# Patient Record
Sex: MALE | Race: ASIAN | Employment: OTHER | URBAN - METROPOLITAN AREA
[De-identification: names, ages, dates, MRNs, and addresses within clinical notes are randomized per-mention and may not be internally consistent; named-entity substitution may affect disease eponyms.]

---

## 2017-01-09 ENCOUNTER — OFFICE VISIT (OUTPATIENT)
Dept: FAMILY MEDICINE | Facility: CLINIC | Age: 72
End: 2017-01-09
Payer: COMMERCIAL

## 2017-01-09 VITALS
OXYGEN SATURATION: 98 % | DIASTOLIC BLOOD PRESSURE: 76 MMHG | HEIGHT: 66 IN | SYSTOLIC BLOOD PRESSURE: 120 MMHG | HEART RATE: 97 BPM | BODY MASS INDEX: 21.53 KG/M2 | WEIGHT: 134 LBS | TEMPERATURE: 97.8 F

## 2017-01-09 DIAGNOSIS — E11.9 TYPE 2 DIABETES MELLITUS WITHOUT COMPLICATION, WITHOUT LONG-TERM CURRENT USE OF INSULIN (H): ICD-10-CM

## 2017-01-09 DIAGNOSIS — L82.1 SK (SEBORRHEIC KERATOSIS): ICD-10-CM

## 2017-01-09 PROCEDURE — 17110 DESTRUCTION B9 LES UP TO 14: CPT | Performed by: FAMILY MEDICINE

## 2017-01-09 PROCEDURE — 99207 C FOOT EXAM  NO CHARGE: CPT | Performed by: FAMILY MEDICINE

## 2017-01-09 PROCEDURE — 99213 OFFICE O/P EST LOW 20 MIN: CPT | Mod: 25 | Performed by: FAMILY MEDICINE

## 2017-01-09 ASSESSMENT — PAIN SCALES - GENERAL: PAINLEVEL: NO PAIN (0)

## 2017-01-09 NOTE — PROGRESS NOTES
SUBJECTIVE:                                                    Stephon Bryant is a 71 year old male who presents to clinic today for the following health issues:    Hypertension Follow-up      Outpatient blood pressures are not being checked.    Low Salt Diet: no added salt       Amount of exercise or physical activity: 5 days/week for an average of greater than 60 minutes    Problems taking medications regularly: No    Medication side effects: none    Diet: regular (no restrictions)      Concern - spot on left cheek     When did it start?: a long time ago    Any strain/injury, other illness, or event to trigger this problem?   no    Previous history of similar problem:   YES - on the other side of face, was removed in clinic with liquid nitrogen      Location:           Left cheek    Describe it:   1 itchy, round spot    Severity: moderate      Progression of symptoms from onset until now:  worsening      Accompanying Signs & Symptoms:  Getting bigger   What have you noticed that tends to make this worse?     None      What have you noticed that tends to make this better?     None      What have you done (this time) to try to treat this problem yourself?     None      Did it help?     no           Past medical, family, and social histories, medications, and allergies are reviewed and updated in Epic.     ROS:  C: NEGATIVE for fever, chills, change in weight  E/M: NEGATIVE for ear, mouth and throat problems  R: NEGATIVE for significant cough or SOB  CV: NEGATIVE for chest pain, palpitations or peripheral edema  ROS otherwise negative    Any history above obtained by the Medical Assistant was reviewed by Dr. Fidencio Cote MD, and edited when necessary.    This document serves as a record of the services and decisions personally performed and made by Dr. Cote. It was created on his behalf by Kiara Cardoso, a trained medical scribe. The creation of this document is based on the provider's statements to the medical  "duran.  Kiara Cardoso January 9, 2017 2:48 PM   OBJECTIVE:                                                    /76 mmHg  Pulse 97  Temp(Src) 97.8  F (36.6  C) (Oral)  Ht 5' 6\" (1.676 m)  Wt 134 lb (60.782 kg)  BMI 21.64 kg/m2  SpO2 98%  Body mass index is 21.64 kg/(m^2).  GENERAL: healthy, alert and no distress  EYES: Eyes grossly normal to inspection, PERRL and conjunctivae and sclerae normal  MS: no gross musculoskeletal defects noted, no edema  SKIN: He has a scaly, benign appearing SK on the right cheek, approximately 7x5 mm  NEURO: Normal strength and tone, mentation intact and speech normal  PSYCH: mentation appears normal, affect normal/bright  Diabetic foot exam: normal DP and PT pulses, no trophic changes or ulcerative lesions, normal sensory exam and normal monofilament exam     ASSESSMENT/PLAN:                                                    (L82.1) SK (seborrheic keratosis)  Comment: 1. I treated an SK in the right sideburn similarly several years ago with good results.   Plan: DESTRUCT BENIGN LESION, UP TO 14        The SK is treated with cryotherapy using liquid nitrogen x 10 seconds x 3 applications. The patient tolerated the procedure well. Recheck at his next regular follow up visit.        (E11.9) Type 2 diabetes mellitus without complication, without long-term current use of insulin (H)  Comment: Patient describes improved glucose readings lately with treatment  Plan: FOOT EXAM  NO CHARGE [81034.114]        Follow up in 4 months         The information in this document, created by the medical scribe for me, accurately reflects the services I personally performed and the decisions made by me. I have reviewed and approved this document for accuracy prior to leaving the patient care area.  Fidencio Cote MD      "

## 2017-01-09 NOTE — MR AVS SNAPSHOT
After Visit Summary   1/9/2017    Stephon Bryant    MRN: [id number]           Patient Information     Date Of Birth          1945        Visit Information        Provider Department      1/9/2017 2:20 PM Fidencio Cote MD [facility]        Today's Diagnoses     SK (seborrheic keratosis)         Type 2 diabetes mellitus without complication, without long-term current use of insulin (H)           Care Instructions        ================================================================================  Normal Values   Blood pressure  <140/90 for most adults    <130/80 for some chronic diseases (ask your care team about yours)    BMI (body mass index)  18.5-25 kg/m2 (based on height and weight)     Thank you for visiting [facility]    Normal or non-critical lab and imaging results will be communicated to you by MyChart, letter or phone within 7 days.  If you do not hear from us within 10 days, please call the clinic. If you have a critical or abnormal lab result, we will notify you by phone as soon as possible.     If you have any questions regarding your visit please contact:     Team Comfort:   Clinic Hours Telephone Number   Dr. Fidencio Acevedo Dr. [place] 7am-5pm  Monday - Friday [phone]  Christiano Beaver RN   Pharmacy 8:00am-8pm Monday-Friday    9am-5pm Saturday-Sunday [phone]   Urgent Care 11am-9pm Monday-Friday        9am-5pm Saturday-Sunday [phone]     After hours, weekend or if you need to make an appointment with your primary provider please call [phone].   After Hours nurse advise: call [facility]: [phone]    Medication Refills:  Call your pharmacy and they will forward the refill to us. Please allow 3 business days for your refills to be completed.                Follow-ups after your visit        Follow-up notes from your care team     Return in  "about 4 months (around 5/8/2017).      Who to contact     If you have questions or need follow up information about today's clinic visit or your schedule please contact [facility] directly at [phone].  Normal or non-critical lab and imaging results will be communicated to you by MyChart, letter or phone within 4 business days after the clinic has received the results. If you do not hear from us within 7 days, please contact the clinic through ganttohart or phone. If you have a critical or abnormal lab result, we will notify you by phone as soon as possible.  Submit refill requests through Bangbite or call your pharmacy and they will forward the refill request to us. Please allow 3 business days for your refill to be completed.          Additional Information About Your Visit        ganttohareCareDiary Information     Bangbite gives you secure access to your electronic health record. If you see a primary care provider, you can also send messages to your care team and make appointments. If you have questions, please call your primary care clinic.  If you do not have a primary care provider, please call [phone] and they will assist you.        Care EveryWhere ID     This is your Care EveryWhere ID. This could be used by other organizations to access your [place] medical records  [id number]        Your Vitals Were     Pulse Temperature Height BMI (Body Mass Index) Pulse Oximetry       97 97.8  F (36.6  C) (Oral) 5' 6\" (1.676 m) 21.64 kg/m2 98%        Blood Pressure from Last 3 Encounters:   01/09/17 120/76   12/08/16 138/82   10/06/16 126/83    Weight from Last 3 Encounters:   01/09/17 134 lb (60.782 kg)   12/29/16 133 lb 12.8 oz (60.691 kg)   12/08/16 136 lb (61.689 kg)              We Performed the Following     DESTRUCT BENIGN LESION, UP TO 14     FOOT EXAM  NO CHARGE [17880.114]        Primary Care Provider Office Phone # Fax #    Fidencio Cote [phone]       [facility]" [place] [address]  [facility] [address]        Thank you!     Thank you for choosing [facility]  for your care. Our goal is always to provide you with excellent care. Hearing back from our patients is one way we can continue to improve our services. Please take a few minutes to complete the written survey that you may receive in the mail after your visit with us. Thank you!             Your Updated Medication List - Protect others around you: Learn how to safely use, store and throw away your medicines at www.disposemymeds.org.          This list is accurate as of: 1/9/17  3:18 PM.  Always use your most recent med list.                   Brand Name Dispense Instructions for use    aspirin 81 MG tablet      Take 1 tablet by mouth daily *.       BIOFLEX PO      Take 1 capful by mouth 1 capsule every other day.       blood glucose monitoring lancets     1 Box    Use to test blood sugar 2 times daily or as directed.  Ok to substitute alternative if insurance prefers.       blood glucose monitoring test strip    ONE TOUCH VERIO IQ    100 strip    Use to test blood sugar 2 times daily or as directed.  Ok to substitute alternative if insurance prefers.       cholecalciferol 5000 UNITS Caps capsule    vitamin D3     Take by mouth daily       etodolac 400 MG tablet    LODINE    60 tablet    Take 1 tablet (400 mg) by mouth 2 times daily (with meals) as needed for joint pain.       FISH OIL      1 capsule every other day       lisinopril 2.5 MG tablet    PRINIVIL/Zestril    90 tablet    Take 1 tablet (2.5 mg) by mouth daily for kidneys in diabetes.       MULTIVITAMIN PO      1 tab every other day       omeprazole 20 MG CR capsule    priLOSEC    90 capsule    Take 1 capsule (20 mg) by mouth daily for reflux.       oxybutynin 5 MG tablet    DITROPAN    90 tablet    Take 1 tablet (5 mg) by mouth every evening to decrease urinary frequency.       pravastatin 40 MG tablet    PRAVACHOL    90 tablet    TAKE  1 TABLET (40 MG) BY MOUTH EVERY EVENING FOR CHOLESTEROL.       scopolamine 72 hr patch    TRANSDERM    4 patch    Apply to hairless area behind one ear at least 4 hours before travel.  Remove old patch and change every 3 days.       traZODone 50 MG tablet    DESYREL    90 tablet    Take 1 tablet (50 mg) by mouth nightly as needed for sleep

## 2017-01-09 NOTE — NURSING NOTE
"Chief Complaint   Patient presents with     Hypertension       Initial /82 mmHg  Pulse 97  Temp(Src) 97.8  F (36.6  C) (Oral)  Ht 5' 6\" (1.676 m)  Wt 134 lb (60.782 kg)  BMI 21.64 kg/m2  SpO2 98% Estimated body mass index is 21.64 kg/(m^2) as calculated from the following:    Height as of this encounter: 5' 6\" (1.676 m).    Weight as of this encounter: 134 lb (60.782 kg).  BP completed using cuff size: marko Lozoya MA      "

## 2017-01-30 ENCOUNTER — OFFICE VISIT (OUTPATIENT)
Dept: FAMILY MEDICINE | Facility: CLINIC | Age: 72
End: 2017-01-30
Payer: COMMERCIAL

## 2017-01-30 ENCOUNTER — TELEPHONE (OUTPATIENT)
Dept: FAMILY MEDICINE | Facility: CLINIC | Age: 72
End: 2017-01-30

## 2017-01-30 VITALS
TEMPERATURE: 97.6 F | OXYGEN SATURATION: 97 % | DIASTOLIC BLOOD PRESSURE: 82 MMHG | SYSTOLIC BLOOD PRESSURE: 128 MMHG | HEART RATE: 86 BPM

## 2017-01-30 DIAGNOSIS — H81.11 BPPV (BENIGN PAROXYSMAL POSITIONAL VERTIGO), RIGHT: Primary | ICD-10-CM

## 2017-01-30 DIAGNOSIS — H61.23 BILATERAL IMPACTED CERUMEN: ICD-10-CM

## 2017-01-30 PROCEDURE — 99213 OFFICE O/P EST LOW 20 MIN: CPT | Performed by: PHYSICIAN ASSISTANT

## 2017-01-30 RX ORDER — MECLIZINE HYDROCHLORIDE 25 MG/1
25 TABLET ORAL EVERY 6 HOURS PRN
Qty: 30 TABLET | Refills: 1 | Status: SHIPPED | OUTPATIENT
Start: 2017-01-30 | End: 2017-07-11

## 2017-01-30 NOTE — MR AVS SNAPSHOT
After Visit Summary   1/30/2017    Stephon Bryant    MRN: [id number]           Patient Information     Date Of Birth          1945        Visit Information        Provider Department      1/30/2017 1:00 PM Joyce Peña PA-C [facility]        Today's Diagnoses     BPPV (benign paroxysmal positional vertigo), right    -  1     Bilateral impacted cerumen           Care Instructions    Meclizine 25 mg every 6 hours as needed for vertigo -dizziness  See physical therapy for vertigo as well    For ear wax use Debrox available over the counter -avoid use of every-tips  Benign Positional Vertigo    The inner ear is located behind the middle ear. It is a part of the balance center of the body. It contains small calcium particles within fluid filled canals (semi-circular canals). These particles can move out of position as a result of aging, head trauma or disease of the inner ear. Once that happens, movement of the head into certain positions may cause the particles to stimulate the inner ear and create the feeling of vertigo.  Vertigo is a false feeling of motion (as if you or the room is spinning). A vertigo attack may cause sudden nausea, vomiting and heavy sweating. Severe vertigo causes a loss of balance and may result in falling. During an attack of vertigo, head movement and body position changes will worsen symptoms.  An episode of vertigo may last seconds, minutes or hours. Once you are over the first episode of vertigo, it may never return. Sometimes symptoms recur off and on over several weeks or longer.  Home Care:    If symptoms are severe, rest quietly in bed. Change positions slowly. There is usually one position that will feel best, such as lying on one side or lying on your back with your head slightly raised on pillows.    Do not drive or work with dangerous machinery for one week after symptoms disappear, in case of a sudden return of symptoms.    Take  medicine as prescribed to relieve your symptoms. Unless another medicine was prescribed for nausea, vomiting and vertigo, you may use over-the-counter motion sickness pills, such as meclizine (Bonine, Bonamine, Antivert) or dimenhydrinate (Dramamine).  Follow Up  with your doctor or as directed by our staff. Report any persistent ringing in the ear or hearing loss to your doctor.  [NOTE: If you had a CT or MRI scan, it will be reviewed by a specialist. You will be notified of any new findings that may affect your care.]  Get Prompt Medical Attention  if any of the following occur:    Worsening of vertigo not controlled by the medicine prescribed    Repeated vomiting not controlled by the medicine prescribed    Increased weakness or fainting    Severe headache or unusual drowsiness or confusion    Weakness of an arm or leg or one side of the face    Difficulty with speech or vision    Seizure    6475-0045 [employer]. [address] [place] [address]. All rights reserved. This information is not intended as a substitute for professional medical care. Always follow your healthcare professional's instructions.        Earwax, Home Treatment    Everyone produces earwax from the lining of the ear canal. It serves to lubricate and protect the ear. The wax that forms in the canal naturally moves toward the outside of the ear and falls out. Sometimes the ear canal may contain too much wax. This can cause a blockage and loss of hearing. Directions are given below for home treatment.  Home care  If your doctor has advised you to remove a wax blockage yourself, follow these directions:    Unless a medicine was prescribed, you may use an over-the-counter product made for clearing earwax. These contain carbamide peroxide. Lie down with the blocked ear facing upward. Apply one dropper full of medicine and wait a few minutes. Grasp the outer ear and wiggle it to help the solution enter the canal.    Lean over  a sink or basin with the blocked ear facing downward. Use a bulb syringe filled with warm (not hot or cold) water to rinse the ear several times. Use gentle pressure only.    If you are having trouble draining the water out of your ear canal, put a few drops of rubbing alcohol (isopropyl alcohol) into the ear canal. This will help remove the remaining water.    Repeat this procedure once a day for up to three days, or until your hearing is back to normal. Do not use this treatment for more than three days in a row.  Don ts    Don t use cold water to rinse the ear. This will make you dizzy.    Don t perform this procedure if you have an ear infection.    Don t perform this procedure if you have a ruptured eardrum.    Don t use cotton swabs, matches, hairpins, keys, or other objects to  clean  the ear canal. This can cause infection of the ear canal or rupture the eardrum. Because of their size and shape, cotton swabs can push earwax deeper into the ear canal instead of removing it.  Follow-up care  Follow up with your health care provider if you are not improving after three cleaning attempts, or as advised.  When to seek medical advice  Call your health care provider right away if any of these occur:    Worsening ear pain    Fever of 101 F (38.3 C) or higher, or as directed by your health care provider    Hearing does not return to normal after three days of treatment    Fluid drainage or bleeding from the ear canal    Swelling, redness, or tenderness of the outer ear    Headache, neck pain, or stiff neck    9205-0254 [employer]. [address] [place] [address], [place] [address]. All rights reserved. This information is not intended as a substitute for professional medical care. Always follow your healthcare professional's instructions.              Follow-ups after your visit        Additional Services     PHYSICAL THERAPY REFERRAL       *This therapy referral will be filtered to a centralized scheduling  office at [facility] and the patient will receive a call to schedule an appointment at a [place] location most convenient for them. *     [facility] provides Physical Therapy evaluation and treatment and many specialty services across the [place] system.  If requesting a specialty program, please choose from the list below.    If you have not heard from the scheduling office within 2 business days, please call [phone] for all locations, with the exception of [place], please call [phone].  Treatment: Evaluation & Treatment  Special Instructions/Modalities: evaluate and treat  Special Programs: Balance/Vestibular    Please be aware that coverage of these services is subject to the terms and limitations of your health insurance plan.  Call member services at your health plan with any benefit or coverage questions.      **Note to Provider:  If you are referring outside of [place] for the therapy appointment, please list the name of the location in the  special instructions  above, print the referral and give to the patient to schedule the appointment.                  Who to contact     If you have questions or need follow up information about today's clinic visit or your schedule please contact [facility] directly at [phone].  Normal or non-critical lab and imaging results will be communicated to you by MyChart, letter or phone within 4 business days after the clinic has received the results. If you do not hear from us within 7 days, please contact the clinic through Halldishart or phone. If you have a critical or abnormal lab result, we will notify you by phone as soon as possible.  Submit refill requests through TidePool or call your pharmacy and they will forward the refill request to us. Please allow 3 business days for your refill to be completed.          Additional Information About Your Visit        MyChart Information     TidePool gives  you secure access to your electronic health record. If you see a primary care provider, you can also send messages to your care team and make appointments. If you have questions, please call your primary care clinic.  If you do not have a primary care provider, please call [phone] and they will assist you.        Care EveryWhere ID     This is your Care EveryWhere ID. This could be used by other organizations to access your [place] medical records  [id number]        Your Vitals Were     Pulse Temperature Pulse Oximetry             86 97.6  F (36.4  C) (Oral) 97%          Blood Pressure from Last 3 Encounters:   01/30/17 128/82   01/09/17 120/76   12/08/16 138/82    Weight from Last 3 Encounters:   01/09/17 134 lb (60.782 kg)   12/29/16 133 lb 12.8 oz (60.691 kg)   12/08/16 136 lb (61.689 kg)              We Performed the Following     PHYSICAL THERAPY REFERRAL          Today's Medication Changes          These changes are accurate as of: 1/30/17  1:45 PM.  If you have any questions, ask your nurse or doctor.               Start taking these medicines.        Dose/Directions    meclizine 25 MG tablet   Commonly known as:  ANTIVERT   Used for:  BPPV (benign paroxysmal positional vertigo), right   Started by:  Joyce Peña PA-C        Dose:  25 mg   Take 1 tablet (25 mg) by mouth every 6 hours as needed for dizziness   Quantity:  30 tablet   Refills:  1            Where to get your medicines      These medications were sent to [id number] - [facility] [address] [address]     Phone:  [phone]    - meclizine 25 MG tablet             Primary Care Provider Office Phone # Fax #    Fidencio Cote [phone]       [facility] [address]  [facility] [address]        Thank you!     Thank you for choosing [facility]  for your care. Our goal is always to provide you with excellent  care. Hearing back from our patients is one way we can continue to improve our services. Please take a few minutes to complete the written survey that you may receive in the mail after your visit with us. Thank you!             Your Updated Medication List - Protect others around you: Learn how to safely use, store and throw away your medicines at www.disposemymeds.org.          This list is accurate as of: 1/30/17  1:45 PM.  Always use your most recent med list.                   Brand Name Dispense Instructions for use    aspirin 81 MG tablet      Take 1 tablet by mouth daily *.       BIOFLEX PO      Take 1 capful by mouth 1 capsule every other day.       blood glucose monitoring lancets     1 Box    Use to test blood sugar 2 times daily or as directed.  Ok to substitute alternative if insurance prefers.       blood glucose monitoring test strip    ONE TOUCH VERIO IQ    100 strip    Use to test blood sugar 2 times daily or as directed.  Ok to substitute alternative if insurance prefers.       cholecalciferol 5000 UNITS Caps capsule    vitamin D3     Take by mouth daily       etodolac 400 MG tablet    LODINE    60 tablet    Take 1 tablet (400 mg) by mouth 2 times daily (with meals) as needed for joint pain.       FISH OIL      1 capsule every other day       lisinopril 2.5 MG tablet    PRINIVIL/Zestril    90 tablet    Take 1 tablet (2.5 mg) by mouth daily for kidneys in diabetes.       meclizine 25 MG tablet    ANTIVERT    30 tablet    Take 1 tablet (25 mg) by mouth every 6 hours as needed for dizziness       MULTIVITAMIN PO      1 tab every other day       omeprazole 20 MG CR capsule    priLOSEC    90 capsule    Take 1 capsule (20 mg) by mouth daily for reflux.       oxybutynin 5 MG tablet    DITROPAN    90 tablet    Take 1 tablet (5 mg) by mouth every evening to decrease urinary frequency.       pravastatin 40 MG tablet    PRAVACHOL    90 tablet    TAKE 1 TABLET (40 MG) BY MOUTH EVERY EVENING FOR CHOLESTEROL.        scopolamine 72 hr patch    TRANSDERM    4 patch    Apply to hairless area behind one ear at least 4 hours before travel.  Remove old patch and change every 3 days.       traZODone 50 MG tablet    DESYREL    90 tablet    Take 1 tablet (50 mg) by mouth nightly as needed for sleep

## 2017-01-30 NOTE — PROGRESS NOTES
SUBJECTIVE:                                                    Stephon Bryant is a 71 year old male who presents to clinic today for the following health issues:    Dizziness     Onset: x10 days     Description:   Do you feel faint:  no   Does it feel like the surroundings (bed, room) are moving: YES  Unsteady/off balance: YES  Have you passed out or fallen: no     Intensity: moderate    Progression of Symptoms:  same    Accompanying Signs & Symptoms:  Heart palpitations: no   Nausea, vomiting: no   Weakness in arms or legs: YES- legs   Fatigue: YES  Vision or speech changes: no   Ringing in ears (Tinnitus): no   Hearing Loss: yes     History:   Head trauma/concussion hx: no   Previous similar symptoms: YES- once a few year ago  Recent bleeding history: no     Precipitating factors:   Worse with activity or head movement: YES, with turning in bed to the right    Any new medications (BP?): YES- lisinopril     Alleviating factors:   Does staying in a fixed position give relief:  YES       Therapies Tried and outcome: none      Problem list and histories reviewed & adjusted, as indicated.  Additional history: as documented    Patient Active Problem List   Diagnosis     Hyperlipidemia LDL goal <130     History of basal cell carcinoma     Erectile dysfunction     Advance care planning     Gastroesophageal reflux disease without esophagitis     Persistent disorder of initiating or maintaining sleep     Type 2 diabetes mellitus without complication, without long-term current use of insulin (H)     Past Surgical History   Procedure Laterality Date     Colonoscopy       Colonoscopy  12/18/2013     Procedure: COMBINED COLONOSCOPY, SINGLE BIOPSY/POLYPECTOMY BY BIOPSY;  Cecal polyp-X 1, removed with cold biopsy forceps.;  Surgeon: Duane, William Charles, MD;  Location:  OR     Esophagoscopy, gastroscopy, duodenoscopy (egd), combined  12/18/2013     Procedure: COMBINED ESOPHAGOSCOPY, GASTROSCOPY, DUODENOSCOPY (EGD), BIOPSY  SINGLE OR MULTIPLE;  Duodenum BX were taken with cold biopsy forceps to evaluate for spru.;  Surgeon: Duane, William Charles, MD;  Location: [facility] OR     Prostate biopsy, needle, saturation sampling  pre-2005     [facility] for elevated PSA       Social History   Substance Use Topics     Smoking status: Former Smoker -- 0.30 packs/day for 7 years     Types: Cigarettes     Smokeless tobacco: Never Used      Comment: [id number] (in service)     Alcohol Use: No     Family History   Problem Relation Age of Onset     DIABETES Sister      Prostate Cancer No family hx of      unk     Hypertension No family hx of      CEREBROVASCULAR DISEASE No family hx of      HEART DISEASE Father      CANCER Brother 64     lymphoma?     Breast Cancer Sister          Current Outpatient Prescriptions   Medication Sig Dispense Refill     meclizine (ANTIVERT) 25 MG tablet Take 1 tablet (25 mg) by mouth every 6 hours as needed for dizziness 30 tablet 1     blood glucose monitoring (ONE TOUCH VERIO IQ) test strip Use to test blood sugar 2 times daily or as directed.  Ok to substitute alternative if insurance prefers. 100 strip 3     blood glucose monitoring (ONE TOUCH DELICA) lancets Use to test blood sugar 2 times daily or as directed.  Ok to substitute alternative if insurance prefers. 1 Box prn     lisinopril (PRINIVIL/ZESTRIL) 2.5 MG tablet Take 1 tablet (2.5 mg) by mouth daily for kidneys in diabetes. 90 tablet 3     oxybutynin (DITROPAN) 5 MG tablet Take 1 tablet (5 mg) by mouth every evening to decrease urinary frequency. 90 tablet 1     pravastatin (PRAVACHOL) 40 MG tablet TAKE 1 TABLET (40 MG) BY MOUTH EVERY EVENING FOR CHOLESTEROL. 90 tablet 1     traZODone (DESYREL) 50 MG tablet Take 1 tablet (50 mg) by mouth nightly as needed for sleep 90 tablet 1     scopolamine (TRANSDERM) (1.5mg base/3day) patch Apply to hairless area behind one ear at least 4 hours before travel.  Remove old patch and change every 3 days. 4 patch 0     etodolac  (LODINE) 400 MG tablet Take 1 tablet (400 mg) by mouth 2 times daily (with meals) as needed for joint pain. 60 tablet 0     omeprazole (PRILOSEC) 20 MG capsule Take 1 capsule (20 mg) by mouth daily for reflux. 90 capsule 1     Bioflavonoid Products (BIOFLEX PO) Take 1 capful by mouth 1 capsule every other day.       cholecalciferol (VITAMIN D3) 5000 UNITS CAPS capsule Take by mouth daily       aspirin 81 MG tablet Take 1 tablet by mouth daily *.        FISH OIL 1 capsule every other day       Multiple Vitamins-Minerals (MULTIVITAMIN OR) 1 tab every other day       Problem list, Medication list, Allergies, and Medical/Social/Surgical histories reviewed in [facility] and updated as appropriate.    ROS:  Constitutional, HEENT, cardiovascular, pulmonary, gi and gu systems are negative, except as otherwise noted.    OBJECTIVE:                                                    /82 mmHg  Pulse 86  Temp(Src) 97.6  F (36.4  C) (Oral)  SpO2 97%  There is no weight on file to calculate BMI.  GENERAL: healthy, alert and no distress  HENT: bilateral ear canals have moderate amount of ear wax, TM's normal, nose and mouth without ulcers or lesions  NECK: no adenopathy, no asymmetry, masses, or scars and thyroid normal to palpation  RESP: lungs clear to auscultation - no rales, rhonchi or wheezes  CV: regular rate and rhythm, normal S1 S2, no S3 or S4, no murmur, click or rub, no peripheral edema and peripheral pulses strong  ABDOMEN: soft, nontender, no hepatosplenomegaly, no masses and bowel sounds normal  MS: no gross musculoskeletal defects noted, no edema  NEURO: Normal strength and tone, sensory exam grossly normal, mentation intact, oriented times 3, speech normal, cranial nerves 2-12 intact, DTR's normal and symmetric +2, gait normal including heel/toe/tandem walking and Romberg normal    Diagnostic Test Results:  none      ASSESSMENT/PLAN:                                                        ICD-10-CM    1. BPPV  (benign paroxysmal positional vertigo), right H81.11 PHYSICAL THERAPY REFERRAL     meclizine (ANTIVERT) 25 MG tablet   2. Bilateral impacted cerumen H61.23      1.Meclizine 25 mg every 6 hours as needed for vertigo -dizziness, only for severe cases  See physical therapy for vertigo as well    2. Patient could not tolerate ear lavage, he will use  Debrox available over the counter -avoid use of q-tips      Joyce Peña PA-C  [facility]

## 2017-01-30 NOTE — PATIENT INSTRUCTIONS
Meclizine 25 mg every 6 hours as needed for vertigo -dizziness  See physical therapy for vertigo as well    For ear wax use Debrox available over the counter -avoid use of every-tips  Benign Positional Vertigo    The inner ear is located behind the middle ear. It is a part of the balance center of the body. It contains small calcium particles within fluid filled canals (semi-circular canals). These particles can move out of position as a result of aging, head trauma or disease of the inner ear. Once that happens, movement of the head into certain positions may cause the particles to stimulate the inner ear and create the feeling of vertigo.  Vertigo is a false feeling of motion (as if you or the room is spinning). A vertigo attack may cause sudden nausea, vomiting and heavy sweating. Severe vertigo causes a loss of balance and may result in falling. During an attack of vertigo, head movement and body position changes will worsen symptoms.  An episode of vertigo may last seconds, minutes or hours. Once you are over the first episode of vertigo, it may never return. Sometimes symptoms recur off and on over several weeks or longer.  Home Care:    If symptoms are severe, rest quietly in bed. Change positions slowly. There is usually one position that will feel best, such as lying on one side or lying on your back with your head slightly raised on pillows.    Do not drive or work with dangerous machinery for one week after symptoms disappear, in case of a sudden return of symptoms.    Take medicine as prescribed to relieve your symptoms. Unless another medicine was prescribed for nausea, vomiting and vertigo, you may use over-the-counter motion sickness pills, such as meclizine (Bonine, Bonamine, Antivert) or dimenhydrinate (Dramamine).  Follow Up  with your doctor or as directed by our staff. Report any persistent ringing in the ear or hearing loss to your doctor.  [NOTE: If you had a CT or MRI scan, it will be reviewed  by a specialist. You will be notified of any new findings that may affect your care.]  Get Prompt Medical Attention  if any of the following occur:    Worsening of vertigo not controlled by the medicine prescribed    Repeated vomiting not controlled by the medicine prescribed    Increased weakness or fainting    Severe headache or unusual drowsiness or confusion    Weakness of an arm or leg or one side of the face    Difficulty with speech or vision    Seizure    9681-4252 The [employer]. [address] [place] [address], [place] [address]. All rights reserved. This information is not intended as a substitute for professional medical care. Always follow your healthcare professional's instructions.        Earwax, Home Treatment    Everyone produces earwax from the lining of the ear canal. It serves to lubricate and protect the ear. The wax that forms in the canal naturally moves toward the outside of the ear and falls out. Sometimes the ear canal may contain too much wax. This can cause a blockage and loss of hearing. Directions are given below for home treatment.  Home care  If your doctor has advised you to remove a wax blockage yourself, follow these directions:    Unless a medicine was prescribed, you may use an over-the-counter product made for clearing earwax. These contain carbamide peroxide. Lie down with the blocked ear facing upward. Apply one dropper full of medicine and wait a few minutes. Grasp the outer ear and wiggle it to help the solution enter the canal.    Lean over a sink or basin with the blocked ear facing downward. Use a bulb syringe filled with warm (not hot or cold) water to rinse the ear several times. Use gentle pressure only.    If you are having trouble draining the water out of your ear canal, put a few drops of rubbing alcohol (isopropyl alcohol) into the ear canal. This will help remove the remaining water.    Repeat this procedure once a day for up to three days, or until your  hearing is back to normal. Do not use this treatment for more than three days in a row.  Don ts    Don t use cold water to rinse the ear. This will make you dizzy.    Don t perform this procedure if you have an ear infection.    Don t perform this procedure if you have a ruptured eardrum.    Don t use cotton swabs, matches, hairpins, keys, or other objects to  clean  the ear canal. This can cause infection of the ear canal or rupture the eardrum. Because of their size and shape, cotton swabs can push earwax deeper into the ear canal instead of removing it.  Follow-up care  Follow up with your health care provider if you are not improving after three cleaning attempts, or as advised.  When to seek medical advice  Call your health care provider right away if any of these occur:    Worsening ear pain    Fever of 101 F (38.3 C) or higher, or as directed by your health care provider    Hearing does not return to normal after three days of treatment    Fluid drainage or bleeding from the ear canal    Swelling, redness, or tenderness of the outer ear    Headache, neck pain, or stiff neck    6130-7013 [employer] [address] [place] [address]. All rights reserved. This information is not intended as a substitute for professional medical care. Always follow your healthcare professional's instructions.

## 2017-01-30 NOTE — TELEPHONE ENCOUNTER
: 1945  PHONE #'s: [phone] (home)     PRESENTING PROBLEM:  Dizziness    NURSING ASSESSMENT  Description:  Patient reports dizziness for the past 10 days.  Dizziness worsens when he changes position.  Patient denies any weakness, loss of consciousness.  No change in vision.    Onset/duration:  >10 days  Precip. factors:  Diabetic and on blood pressure medication  Sx specific meds:  lisinopril  Last exam/Tx:  17    RECOMMENDED DISPOSITION:  See in 2- 4 hours, another person to drive -   Will comply with recommendation: YES  If further questions/concerns or if Sx do not improve, worsen or new Sx develop, call your PCP or [facility] as soon as possible.    NOTES:  Disposition was determined by the first positive assessment question, therefore all previous assessment questions were negative.  Informed to check provider manual or call insurance company to assure coverage.    Guideline used:  Telephone Triage Protocols for Nurses, Fifth Edition, Jenni Salazar RN  2017

## 2017-03-14 ENCOUNTER — TRANSFERRED RECORDS (OUTPATIENT)
Dept: HEALTH INFORMATION MANAGEMENT | Facility: CLINIC | Age: 72
End: 2017-03-14

## 2017-03-27 ENCOUNTER — OFFICE VISIT (OUTPATIENT)
Dept: FAMILY MEDICINE | Facility: CLINIC | Age: 72
End: 2017-03-27
Payer: COMMERCIAL

## 2017-03-27 VITALS
TEMPERATURE: 96.2 F | BODY MASS INDEX: 21.86 KG/M2 | SYSTOLIC BLOOD PRESSURE: 98 MMHG | HEIGHT: 66 IN | DIASTOLIC BLOOD PRESSURE: 67 MMHG | HEART RATE: 67 BPM | OXYGEN SATURATION: 100 % | WEIGHT: 136 LBS

## 2017-03-27 DIAGNOSIS — E11.9 TYPE 2 DIABETES MELLITUS WITHOUT COMPLICATION, WITHOUT LONG-TERM CURRENT USE OF INSULIN (H): ICD-10-CM

## 2017-03-27 DIAGNOSIS — H61.21 IMPACTED CERUMEN OF RIGHT EAR: ICD-10-CM

## 2017-03-27 DIAGNOSIS — H10.33 ACUTE CONJUNCTIVITIS OF BOTH EYES, UNSPECIFIED ACUTE CONJUNCTIVITIS TYPE: Primary | ICD-10-CM

## 2017-03-27 PROCEDURE — 99213 OFFICE O/P EST LOW 20 MIN: CPT | Performed by: NURSE PRACTITIONER

## 2017-03-27 RX ORDER — POLYMYXIN B SULFATE AND TRIMETHOPRIM 1; 10000 MG/ML; [USP'U]/ML
2 SOLUTION OPHTHALMIC EVERY 4 HOURS
Qty: 5 ML | Refills: 0 | Status: SHIPPED | OUTPATIENT
Start: 2017-03-27 | End: 2017-04-03

## 2017-03-27 NOTE — PATIENT INSTRUCTIONS
Based on your medical history and these are the current health maintenance or preventive care services that you are due for (some may have been done at this visit)  Health Maintenance Due   Topic Date Due     EYE EXAM Q1 YEAR( NO INBASKET)  02/23/1946         At [facility], we strive to deliver an exceptional experience to you, every time we see you.    If you receive a survey in the mail, please send us back your thoughts. We really do value your feedback.    Your care team's suggested websites for health information:  Www.[employer].org : Up to date and easily searchable information on multiple topics.  Www.medlineplus.gov : medication info, interactive tutorials, watch real surgeries online  Www.familydoctor.org : good info from the Academy of Family Physicians  Www.cdc.gov : public health info, travel advisories, epidemics (H1N1)  Www.aap.org : children's health info, normal development, vaccinations  Www.health.[facility].[place].us : [place] dept of health, public health issues in [place], N1N1    How to contact your care team:   Team [facility] [phone]         Pharmacy [phone]    Dr. Slade, Sia Peña PA-C, Dr. Mejia, Marry NAVA CNP, Kristie Stroud PA-C, Dr. Mckeon, and ELIJAH Singh CNP    Team RNs: Tess & Ayleen      Clinic hours  M-Th 7 am-7 pm   Fri 7 am-5 pm.   Urgent care M-F 11 am-9 pm,   Sat/Sun 9 am-5 pm.  Pharmacy M-Th 8 am-8 pm Fri 8 am-6 pm  Sat/Sun 9 am-5 pm.     All password changes, disabled accounts, or ID changes in [email or web address] will be done by our Access Services Department.    If you need help with your account or password, call: [phone]. Clinic staff no longer has the ability to change passwords.     Conjunctivitis, Nonspecific    The membrane that covers the white part of your eye (the conjunctiva) is inflamed. Inflammation happens when your body responds to an injury, allergic reaction, infection, or illness. Symptoms of  inflammation in the eye may include redness, irritation, itching, swelling, or burning. These symptoms should go away within the next 24 hours. Conjunctivitis may be related to a particle that was in your eye. If so, it may wash out with your tears or irrigation treatment. Being exposed to liquid chemicals or fumes may also cause this reaction.   Home care    Apply a cold pack (ice in a plastic bag, wrapped in a towel) over the eye for 20 minutes at a time. This will reduce pain.    Artificial tears may be prescribed to reduce irritation or redness.  These should be used 3 to 4 times a day.    You may use acetaminophen or ibuprofen to control pain, unless another medicine was prescribed.(Note: If you have chronic liver or kidney disease, or if you have ever had a stomach ulcer or gastrointestinal bleeding, talk with your healthcare provider before using these medicines.)  Follow-up care  Follow up with your healthcare provider, or as advised.  When to seek medical advice  Call your healthcare provider right away if any of these occur:    Increased eyelid swelling    Increased eye pain    Increased redness or drainage from the eye    Increased blurry vision or increased sensitivity to light    Failure of normal vision to return within 24 to 48 hours    9494-3033 The [employer]. [address] [place] [address]. All rights reserved. This information is not intended as a substitute for professional medical care. Always follow your healthcare professional's instructions.          Earwax, Home Treatment    Everyone produces earwax from the lining of the ear canal. It serves to lubricate and protect the ear. The wax that forms in the canal naturally moves toward the outside of the ear and falls out. Sometimes the ear canal may contain too much wax. This can cause a blockage and loss of hearing. Directions are given below for home treatment.  Home care  If your doctor has advised you to remove a wax  blockage yourself, follow these directions:    Unless a medicine was prescribed, you may use an over-the-counter product made for clearing earwax. These contain carbamide peroxide. Lie down with the blocked ear facing upward. Apply one dropper full of medicine and wait a few minutes. Grasp the outer ear and wiggle it to help the solution enter the canal.    Lean over a sink or basin with the blocked ear facing downward. Use a bulb syringe filled with warm (not hot or cold) water to rinse the ear several times. Use gentle pressure only.    If you are having trouble draining the water out of your ear canal, put a few drops of rubbing alcohol (isopropyl alcohol) into the ear canal. This will help remove the remaining water.    Repeat this procedure once a day for up to three days, or until your hearing is back to normal. Do not use this treatment for more than three days in a row.  Don ts    Don t use cold water to rinse the ear. This will make you dizzy.    Don t perform this procedure if you have an ear infection.    Don t perform this procedure if you have a ruptured eardrum.    Don t use cotton swabs, matches, hairpins, keys, or other objects to  clean  the ear canal. This can cause infection of the ear canal or rupture the eardrum. Because of their size and shape, cotton swabs can push earwax deeper into the ear canal instead of removing it.  Follow-up care  Follow up with your health care provider if you are not improving after three cleaning attempts, or as advised.  When to seek medical advice  Call your health care provider right away if any of these occur:    Worsening ear pain    Fever of 101 F (38.3 C) or higher, or as directed by your health care provider    Hearing does not return to normal after three days of treatment    Fluid drainage or bleeding from the ear canal    Swelling, redness, or tenderness of the outer ear    Headache, neck pain, or stiff neck    9098-1535 [employer] [address]  [place] [address]. All rights reserved. This information is not intended as a substitute for professional medical care. Always follow your healthcare professional's instructions.

## 2017-03-27 NOTE — MR AVS SNAPSHOT
After Visit Summary   3/27/2017    Stephon Bryant    MRN: [id number]           Patient Information     Date Of Birth          1945        Visit Information        Provider Department      3/27/2017 10:00 AM Cailin Flores APRN CNP [facility]        Today's Diagnoses     Acute conjunctivitis of both eyes, unspecified acute conjunctivitis type    -  1      Care Instructions    Based on your medical history and these are the current health maintenance or preventive care services that you are due for (some may have been done at this visit)  Health Maintenance Due   Topic Date Due     EYE EXAM Q1 YEAR( NO INBASKET)  02/23/1946         At [facility], we strive to deliver an exceptional experience to you, every time we see you.    If you receive a survey in the mail, please send us back your thoughts. We really do value your feedback.    Your care team's suggested websites for health information:  Www.Artspace.org : Up to date and easily searchable information on multiple topics.  Www.medlineplus.gov : medication info, interactive tutorials, watch real surgeries online  Www.familydoctor.org : good info from the Academy of Family Physicians  Www.cdc.gov : public health info, travel advisories, epidemics (H1N1)  Www.aap.org : children's health info, normal development, vaccinations  Www.health.state.[place].us : [place] dept of health, public health issues in [place], N1N1    How to contact your care team:   Team Earnestine/Spirit [phone]         Pharmacy [phone]    Dr. Slade, Sia Peña PA-C, Marry Lawson CNP, Kristie Stroud PA-C, Dr. Mckeon, and ELIJAH Singh CNP    Team RNs: Tess & Ayleen      Clinic hours  M-Th 7 am-7 pm   Fri 7 am-5 pm.   Urgent care M-F 11 am-9 pm,   Sat/Sun 9 am-5 pm.  Pharmacy M-Th 8 am-8 pm Fri 8 am-6 pm  Sat/Sun 9 am-5 pm.     All password changes, disabled accounts, or ID changes in [employer]Outsmart  will be done by our Access Services Department.    If you need help with your account or password, call: [phone]. Clinic staff no longer has the ability to change passwords.     Conjunctivitis, Nonspecific    The membrane that covers the white part of your eye (the conjunctiva) is inflamed. Inflammation happens when your body responds to an injury, allergic reaction, infection, or illness. Symptoms of inflammation in the eye may include redness, irritation, itching, swelling, or burning. These symptoms should go away within the next 24 hours. Conjunctivitis may be related to a particle that was in your eye. If so, it may wash out with your tears or irrigation treatment. Being exposed to liquid chemicals or fumes may also cause this reaction.   Home care    Apply a cold pack (ice in a plastic bag, wrapped in a towel) over the eye for 20 minutes at a time. This will reduce pain.    Artificial tears may be prescribed to reduce irritation or redness.  These should be used 3 to 4 times a day.    You may use acetaminophen or ibuprofen to control pain, unless another medicine was prescribed.(Note: If you have chronic liver or kidney disease, or if you have ever had a stomach ulcer or gastrointestinal bleeding, talk with your healthcare provider before using these medicines.)  Follow-up care  Follow up with your healthcare provider, or as advised.  When to seek medical advice  Call your healthcare provider right away if any of these occur:    Increased eyelid swelling    Increased eye pain    Increased redness or drainage from the eye    Increased blurry vision or increased sensitivity to light    Failure of normal vision to return within 24 to 48 hours    2722-0224 [employer]. [address] [place] [address], [place] [address]. All rights reserved. This information is not intended as a substitute for professional medical care. Always follow your healthcare professional's instructions.          Earwax, Home  Treatment    Everyone produces earwax from the lining of the ear canal. It serves to lubricate and protect the ear. The wax that forms in the canal naturally moves toward the outside of the ear and falls out. Sometimes the ear canal may contain too much wax. This can cause a blockage and loss of hearing. Directions are given below for home treatment.  Home care  If your doctor has advised you to remove a wax blockage yourself, follow these directions:    Unless a medicine was prescribed, you may use an over-the-counter product made for clearing earwax. These contain carbamide peroxide. Lie down with the blocked ear facing upward. Apply one dropper full of medicine and wait a few minutes. Grasp the outer ear and wiggle it to help the solution enter the canal.    Lean over a sink or basin with the blocked ear facing downward. Use a bulb syringe filled with warm (not hot or cold) water to rinse the ear several times. Use gentle pressure only.    If you are having trouble draining the water out of your ear canal, put a few drops of rubbing alcohol (isopropyl alcohol) into the ear canal. This will help remove the remaining water.    Repeat this procedure once a day for up to three days, or until your hearing is back to normal. Do not use this treatment for more than three days in a row.  Don ts    Don t use cold water to rinse the ear. This will make you dizzy.    Don t perform this procedure if you have an ear infection.    Don t perform this procedure if you have a ruptured eardrum.    Don t use cotton swabs, matches, hairpins, keys, or other objects to  clean  the ear canal. This can cause infection of the ear canal or rupture the eardrum. Because of their size and shape, cotton swabs can push earwax deeper into the ear canal instead of removing it.  Follow-up care  Follow up with your health care provider if you are not improving after three cleaning attempts, or as advised.  When to seek medical advice  Call your  health care provider right away if any of these occur:    Worsening ear pain    Fever of 101 F (38.3 C) or higher, or as directed by your health care provider    Hearing does not return to normal after three days of treatment    Fluid drainage or bleeding from the ear canal    Swelling, redness, or tenderness of the outer ear    Headache, neck pain, or stiff neck    9066-4909 [employer] [address] [place] [address]. All rights reserved. This information is not intended as a substitute for professional medical care. Always follow your healthcare professional's instructions.              Follow-ups after your visit        Who to contact     If you have questions or need follow up information about today's clinic visit or your schedule please contact [facility] directly at [phone].  Normal or non-critical lab and imaging results will be communicated to you by MyChart, letter or phone within 4 business days after the clinic has received the results. If you do not hear from us within 7 days, please contact the clinic through MyChart or phone. If you have a critical or abnormal lab result, we will notify you by phone as soon as possible.  Submit refill requests through [employer] or call your pharmacy and they will forward the refill request to us. Please allow 3 business days for your refill to be completed.          Additional Information About Your Visit        [employer] Information     [employer] gives you secure access to your electronic health record. If you see a primary care provider, you can also send messages to your care team and make appointments. If you have questions, please call your primary care clinic.  If you do not have a primary care provider, please call [phone] and they will assist you.        Care EveryWhere ID     This is your Care EveryWhere ID. This could be used by other organizations to access your [place] medical records  [id number]       "  Your Vitals Were     Pulse Temperature Height Pulse Oximetry BMI (Body Mass Index)       67 96.2  F (35.7  C) (Oral) 5' 6\" (1.676 m) 100% 21.95 kg/m2        Blood Pressure from Last 3 Encounters:   03/27/17 98/67   01/30/17 128/82   01/09/17 120/76    Weight from Last 3 Encounters:   03/27/17 136 lb (61.7 kg)   01/09/17 134 lb (60.8 kg)   12/29/16 133 lb 12.8 oz (60.7 kg)              Today, you had the following     No orders found for display         Today's Medication Changes          These changes are accurate as of: 3/27/17 10:39 AM.  If you have any questions, ask your nurse or doctor.               Start taking these medicines.        Dose/Directions    trimethoprim-polymyxin b ophthalmic solution   Commonly known as:  POLYTRIM   Used for:  Acute conjunctivitis of both eyes, unspecified acute conjunctivitis type   Started by:  Cailin Flores APRN CNP        Dose:  2 drop   Place 2 drops into both eyes every 4 hours for 7 days   Quantity:  5 mL   Refills:  0            Where to get your medicines      These medications were sent to [id number] - [place], [place] - [address]  [address] [facility] [address]     Phone:  [phone]     trimethoprim-polymyxin b ophthalmic solution                Primary Care Provider Office Phone # Fax #    Fidencio Cote [phone]       [facility] [place] [address]  [facility] [address]        Thank you!     Thank you for choosing [facility]  for your care. Our goal is always to provide you with excellent care. Hearing back from our patients is one way we can continue to improve our services. Please take a few minutes to complete the written survey that you may receive in the mail after your visit with us. Thank you!             Your Updated Medication List - Protect others around you: Learn how to safely use, store and throw away your medicines at www.disposemymeds.org.          This list " is accurate as of: 3/27/17 10:39 AM.  Always use your most recent med list.                   Brand Name Dispense Instructions for use    aspirin 81 MG tablet      Take 1 tablet by mouth daily *.       BIOFLEX PO      Take 1 capful by mouth 1 capsule every other day.       blood glucose monitoring lancets     1 Box    Use to test blood sugar 2 times daily or as directed.  Ok to substitute alternative if insurance prefers.       blood glucose monitoring test strip    ONE TOUCH VERIO IQ    100 strip    Use to test blood sugar 2 times daily or as directed.  Ok to substitute alternative if insurance prefers.       cholecalciferol 5000 UNITS Caps capsule    vitamin D3     Take by mouth daily       etodolac 400 MG tablet    LODINE    60 tablet    Take 1 tablet (400 mg) by mouth 2 times daily (with meals) as needed for joint pain.       FISH OIL      1 capsule every other day       lisinopril 2.5 MG tablet    PRINIVIL/Zestril    90 tablet    Take 1 tablet (2.5 mg) by mouth daily for kidneys in diabetes.       meclizine 25 MG tablet    ANTIVERT    30 tablet    Take 1 tablet (25 mg) by mouth every 6 hours as needed for dizziness       MULTIVITAMIN PO      1 tab every other day       omeprazole 20 MG CR capsule    priLOSEC    90 capsule    Take 1 capsule (20 mg) by mouth daily for reflux.       oxybutynin 5 MG tablet    DITROPAN    90 tablet    Take 1 tablet (5 mg) by mouth every evening to decrease urinary frequency.       pravastatin 40 MG tablet    PRAVACHOL    90 tablet    TAKE 1 TABLET (40 MG) BY MOUTH EVERY EVENING FOR CHOLESTEROL.       scopolamine 72 hr patch    TRANSDERM    4 patch    Apply to hairless area behind one ear at least 4 hours before travel.  Remove old patch and change every 3 days.       traZODone 50 MG tablet    DESYREL    90 tablet    Take 1 tablet (50 mg) by mouth nightly as needed for sleep       trimethoprim-polymyxin b ophthalmic solution    POLYTRIM    5 mL    Place 2 drops into both eyes every 4  hours for 7 days

## 2017-03-27 NOTE — NURSING NOTE
"Chief Complaint   Patient presents with     Eye Problem       Initial BP 98/67 (BP Location: Left arm, Patient Position: Chair, Cuff Size: Adult Regular)  Pulse 67  Temp 96.2  F (35.7  C) (Oral)  Ht 5' 6\" (1.676 m)  Wt 136 lb (61.7 kg)  SpO2 100%  BMI 21.95 kg/m2 Estimated body mass index is 21.95 kg/(m^2) as calculated from the following:    Height as of this encounter: 5' 6\" (1.676 m).    Weight as of this encounter: 136 lb (61.7 kg).  Medication Reconciliation: complete   Chichi Douglas MA      "

## 2017-04-03 ENCOUNTER — TELEPHONE (OUTPATIENT)
Dept: FAMILY MEDICINE | Facility: CLINIC | Age: 72
End: 2017-04-03

## 2017-04-03 NOTE — TELEPHONE ENCOUNTER
Visit notes to call back with concerns. Sent message to provider.    Ayleen Bowen RN, [facility]

## 2017-04-03 NOTE — TELEPHONE ENCOUNTER
Reason for Call:  Other prescription    Detailed comments: Pt calling for he was given medication for his pink eye but it has not getting any better and would like to be prescribe an alternative medication.    Phone Number Patient can be reached at: Home number on file [phone] (home)    Best Time: Anytime    Can we leave a detailed message on this number? YES    Call taken on 4/3/2017 at 8:06 AM by Nathaniel Baker

## 2017-04-04 ENCOUNTER — OFFICE VISIT (OUTPATIENT)
Dept: FAMILY MEDICINE | Facility: CLINIC | Age: 72
End: 2017-04-04
Payer: COMMERCIAL

## 2017-04-04 VITALS
DIASTOLIC BLOOD PRESSURE: 80 MMHG | OXYGEN SATURATION: 100 % | HEIGHT: 66 IN | SYSTOLIC BLOOD PRESSURE: 131 MMHG | WEIGHT: 132 LBS | TEMPERATURE: 97 F | HEART RATE: 70 BPM | BODY MASS INDEX: 21.21 KG/M2

## 2017-04-04 DIAGNOSIS — H57.89 EYE IRRITATION: ICD-10-CM

## 2017-04-04 DIAGNOSIS — R10.12 ABDOMINAL PAIN, LEFT UPPER QUADRANT: Primary | ICD-10-CM

## 2017-04-04 LAB
ALBUMIN SERPL-MCNC: 4.2 G/DL (ref 3.4–5)
ALP SERPL-CCNC: 61 U/L (ref 40–150)
ALT SERPL W P-5'-P-CCNC: 33 U/L (ref 0–70)
AMYLASE SERPL-CCNC: 123 U/L (ref 30–110)
AST SERPL W P-5'-P-CCNC: 25 U/L (ref 0–45)
BASOPHILS # BLD AUTO: 0 10E9/L (ref 0–0.2)
BASOPHILS NFR BLD AUTO: 0.3 %
BILIRUB DIRECT SERPL-MCNC: 0.1 MG/DL (ref 0–0.2)
BILIRUB SERPL-MCNC: 0.5 MG/DL (ref 0.2–1.3)
DIFFERENTIAL METHOD BLD: NORMAL
EOSINOPHIL # BLD AUTO: 0.1 10E9/L (ref 0–0.7)
EOSINOPHIL NFR BLD AUTO: 2.2 %
ERYTHROCYTE [DISTWIDTH] IN BLOOD BY AUTOMATED COUNT: 12.5 % (ref 10–15)
HCT VFR BLD AUTO: 45.7 % (ref 40–53)
HGB BLD-MCNC: 14.9 G/DL (ref 13.3–17.7)
LYMPHOCYTES # BLD AUTO: 2.4 10E9/L (ref 0.8–5.3)
LYMPHOCYTES NFR BLD AUTO: 40.1 %
MCH RBC QN AUTO: 32.5 PG (ref 26.5–33)
MCHC RBC AUTO-ENTMCNC: 32.6 G/DL (ref 31.5–36.5)
MCV RBC AUTO: 100 FL (ref 78–100)
MONOCYTES # BLD AUTO: 0.6 10E9/L (ref 0–1.3)
MONOCYTES NFR BLD AUTO: 9.8 %
NEUTROPHILS # BLD AUTO: 2.8 10E9/L (ref 1.6–8.3)
NEUTROPHILS NFR BLD AUTO: 47.6 %
PLATELET # BLD AUTO: 181 10E9/L (ref 150–450)
PROT SERPL-MCNC: 7.5 G/DL (ref 6.8–8.8)
RBC # BLD AUTO: 4.58 10E12/L (ref 4.4–5.9)
WBC # BLD AUTO: 5.9 10E9/L (ref 4–11)

## 2017-04-04 PROCEDURE — 87338 HPYLORI STOOL AG IA: CPT | Performed by: FAMILY MEDICINE

## 2017-04-04 PROCEDURE — 36415 COLL VENOUS BLD VENIPUNCTURE: CPT | Performed by: FAMILY MEDICINE

## 2017-04-04 PROCEDURE — 85025 COMPLETE CBC W/AUTO DIFF WBC: CPT | Performed by: FAMILY MEDICINE

## 2017-04-04 PROCEDURE — 82150 ASSAY OF AMYLASE: CPT | Performed by: FAMILY MEDICINE

## 2017-04-04 PROCEDURE — 99214 OFFICE O/P EST MOD 30 MIN: CPT | Performed by: FAMILY MEDICINE

## 2017-04-04 PROCEDURE — 80076 HEPATIC FUNCTION PANEL: CPT | Performed by: FAMILY MEDICINE

## 2017-04-04 ASSESSMENT — PAIN SCALES - GENERAL: PAINLEVEL: MODERATE PAIN (4)

## 2017-04-04 NOTE — PATIENT INSTRUCTIONS
================================================================================  Normal Values   Blood pressure  <140/90 for most adults    <130/80 for some chronic diseases (ask your care team about yours)    BMI (body mass index)  18.5-25 kg/m2 (based on height and weight)     Thank you for visiting [facility]    Normal or non-critical lab and imaging results will be communicated to you by MyChart, letter or phone within 7 days.  If you do not hear from us within 10 days, please call the clinic. If you have a critical or abnormal lab result, we will notify you by phone as soon as possible.     If you have any questions regarding your visit please contact:     Team Comfort:   Clinic Hours Telephone Number   Dr. Fidencio Castro 7am-5pm  Monday - Friday [phone]  Christiano RN  Fela RN  Saranya RN   Pharmacy 8:00am-8pm Monday-Friday    9am-5pm Saturday- [phone]   Urgent Care 11am-9pm Monday-Friday        9am-5pm Saturday- [phone]     After hours, weekend or if you need to make an appointment with your primary provider please call [phone].   After Hours nurse advise: call [facility]: [phone]    Medication Refills:  Call your pharmacy and they will forward the refill to us. Please allow 3 business days for your refills to be completed.      Please call [facility]/[phone] to schedule your abdominal CT scan.

## 2017-04-04 NOTE — TELEPHONE ENCOUNTER
Spoke with patient this am. He is noting that his eyes are still very red. Patient is noting that he has had left side, under rib abdominal pain for 2 weeks.  After eating it is worse. States he feels it is hard to digest his food. Yesterday got very dizzy while taking a shower. He has only been able to liquids due to the feeling he gets when he eats. Negative for diarrhea, vomiting and fever. He can not rate the pain.     Patient is scheduled in clinic today for assessment.    Ayleen Bowen RN, [facility] Triage

## 2017-04-04 NOTE — MR AVS SNAPSHOT
After Visit Summary   2017    Stephon Bryant    MRN: [id number]           Patient Information     Date Of Birth          1945        Visit Information        Provider Department      2017 3:40 PM Fidencio Cote MD [facility]        Today's Diagnoses     Abdominal pain, left upper quadrant    -  1    Eye irritation          Care Instructions        ================================================================================  Normal Values   Blood pressure  <140/90 for most adults    <130/80 for some chronic diseases (ask your care team about yours)    BMI (body mass index)  18.5-25 kg/m2 (based on height and weight)     Thank you for visiting [facility]    Normal or non-critical lab and imaging results will be communicated to you by MyChart, letter or phone within 7 days.  If you do not hear from us within 10 days, please call the clinic. If you have a critical or abnormal lab result, we will notify you by phone as soon as possible.     If you have any questions regarding your visit please contact:     Team Comfort:   Clinic Hours Telephone Number   Dr. Fidencio Castro 7am-5pm  Monday - Friday [phone]  Christiano Beaver RN   Pharmacy 8:00am-8pm Monday-Friday    9am-5pm Saturday- [phone]   Urgent Care 11am-9pm Monday-Friday        9am-5pm Saturday- [phone]     After hours, weekend or if you need to make an appointment with your primary provider please call [phone].   After Hours nurse advise: call [facility]: [phone]    Medication Refills:  Call your pharmacy and they will forward the refill to us. Please allow 3 business days for your refills to be completed.      Please call [facility] Radiology/[phone] to schedule your abdominal CT scan.           Follow-ups after your visit        Future tests  "that were ordered for you today     Open Future Orders        Priority Expected Expires Ordered    CT Abdomen Pelvis w Contrast Routine  4/4/2018 4/4/2017    H Pylori antigen, stool Routine  5/4/2017 4/4/2017            Who to contact     If you have questions or need follow up information about today's clinic visit or your schedule please contact [facility] directly at [phone].  Normal or non-critical lab and imaging results will be communicated to you by [employer], letter or phone within 4 business days after the clinic has received the results. If you do not hear from us within 7 days, please contact the clinic through [employer] or phone. If you have a critical or abnormal lab result, we will notify you by phone as soon as possible.  Submit refill requests through [employer] or call your pharmacy and they will forward the refill request to us. Please allow 3 business days for your refill to be completed.          Additional Information About Your Visit        [employer] Information     [employer] gives you secure access to your electronic health record. If you see a primary care provider, you can also send messages to your care team and make appointments. If you have questions, please call your primary care clinic.  If you do not have a primary care provider, please call [phone] and they will assist you.        Care EveryWhere ID     This is your Care EveryWhere ID. This could be used by other organizations to access your [place] medical records  [id number]        Your Vitals Were     Pulse Temperature Height Pulse Oximetry BMI (Body Mass Index)       70 97  F (36.1  C) (Oral) 5' 6\" (1.676 m) 100% 21.31 kg/m2        Blood Pressure from Last 3 Encounters:   04/04/17 131/80   03/27/17 98/67   01/30/17 128/82    Weight from Last 3 Encounters:   04/04/17 132 lb (59.9 kg)   03/27/17 136 lb (61.7 kg)   01/09/17 134 lb (60.8 kg)              We Performed the Following     Amylase     CBC with " platelets differential     Hepatic panel        Primary Care Provider Office Phone # Fax #    Fidencio Cote [phone]       [facility] [address]  [facility] [address]        Thank you!     [facility]. Our goal is always to provide you with excellent care. Hearing back from our patients is one way we can continue to improve our services. Please take a few minutes to complete the written survey that you may receive in the mail after your visit with us. Thank you!             Your Updated Medication List - Protect others around you: Learn how to safely use, store and throw away your medicines at www.disposemymeds.org.          This list is accurate as of: 4/4/17  4:13 PM.  Always use your most recent med list.                   Brand Name Dispense Instructions for use    aspirin 81 MG tablet      Take 1 tablet by mouth daily *.       BIOFLEX PO      Take 1 capful by mouth 1 capsule every other day.       blood glucose monitoring lancets     1 Box    Use to test blood sugar 2 times daily or as directed.  Ok to substitute alternative if insurance prefers.       blood glucose monitoring test strip    ONE TOUCH VERIO IQ    100 strip    Use to test blood sugar 2 times daily or as directed.  Ok to substitute alternative if insurance prefers.       cholecalciferol 5000 UNITS Caps capsule    vitamin D3     Take by mouth daily       etodolac 400 MG tablet    LODINE    60 tablet    Take 1 tablet (400 mg) by mouth 2 times daily (with meals) as needed for joint pain.       FISH OIL      1 capsule every other day       lisinopril 2.5 MG tablet    PRINIVIL/Zestril    90 tablet    Take 1 tablet (2.5 mg) by mouth daily for kidneys in diabetes.       meclizine 25 MG tablet    ANTIVERT    30 tablet    Take 1 tablet (25 mg) by mouth every 6 hours as needed for dizziness       MULTIVITAMIN PO      1 tab every other day       omeprazole 20  MG CR capsule    priLOSEC    90 capsule    Take 1 capsule (20 mg) by mouth daily for reflux.       oxybutynin 5 MG tablet    DITROPAN    90 tablet    Take 1 tablet (5 mg) by mouth every evening to decrease urinary frequency.       pravastatin 40 MG tablet    PRAVACHOL    90 tablet    TAKE 1 TABLET (40 MG) BY MOUTH EVERY EVENING FOR CHOLESTEROL.       scopolamine 72 hr patch    TRANSDERM    4 patch    Apply to hairless area behind one ear at least 4 hours before travel.  Remove old patch and change every 3 days.       traZODone 50 MG tablet    DESYREL    90 tablet    Take 1 tablet (50 mg) by mouth nightly as needed for sleep

## 2017-04-04 NOTE — PROGRESS NOTES
SUBJECTIVE:                                                    Stephon Bryant is a 72 year old male who presents to clinic today for the following health issues:    ABDOMINAL PAIN     Onset: about 3 weeks    Description:   Character: Dull ache  Location: epigastric region  Radiation: None    Intensity: 4/10    Progression of Symptoms:  worsening and intermittent    Accompanying Signs & Symptoms:  Fever/Chills?: no   Gas/Bloating: YES- gas  Nausea: Yes   Vomiting: no   Diarrhea?: no   Constipation:no   Dysuria or Hematuria: no   Weight loss: YES   History:   Trauma: no   Previous similar pain: no    Previous tests done: CT 2013, MRI 2014 and Colonoscopy 2013, US and Bili scan 2015    Precipitating factors:   Does the pain change with:     Food: YES - pain starts after eating. Patient reports he has to stand up and walk around after eating because it's too painful to sit after eating.    BM: no     Urination: no     Alleviating factors:  none    Therapies Tried and outcome: stay away from solid foods    LMP:  not applicable   -Patient reports if he eats around 7 pm, he will not be able to go to sleep until about 11-12 pm at night due to pain.      Eye(s) Problem     Onset: approximately 3/17/17    Description:   Location: bilateral  Pain: YES  Redness: YES  Itchy: YES    Accompanying Signs & Symptoms:  Discharge/mattering: no  Swelling: YES  Visual changes: YES- Blurry  Fever: no  Nasal Congestion: no  Bothered by bright lights: no     History:   Trauma: no   Foreign body exposure: no  -Saw eye doctor for routine exam 3/14/17 (normal exam).     Precipitating factors:   Wearing contacts: no    Alleviating factors:  Improved by: none       Therapies Tried and outcome: Polytrim drops prescribed 3/27/17 - no relief, eye symptoms did not improve at all      Past medical, family, and social histories, medications, and allergies are reviewed and updated in [facility].     ROS:  Constitutional: Patient reports he is still losing  "weight - 4 lbs since last visit, which is mild but it is the lowest weight we have on record in the past 3.5 years. He notes he has been trying to eat normal.   ENDO: Patient reports lower blood sugars lately, ranging from .   NEURO: Patient reports yesterday after he worked out, he felt very dizzy while he was showering and had to sit down for about 30 minutes before he could leave the gym.   HEENT, cardiovascular, pulmonary, gi and gu systems are negative, except as otherwise noted.      Any history above obtained by the Medical Assistant was reviewed by Dr. Fidencio Cote MD, and edited when necessary.    This document serves as a record of the services and decisions personally performed and made by Dr. Cote. It was created on his behalf by Kiara Cardoso, a trained medical scribe. The creation of this document is based on the provider's statements to the medical scribe.  Kiara Cardoso April 4, 2017 3:48 PM   OBJECTIVE:                                                    /80 (BP Location: Left arm, Patient Position: Chair, Cuff Size: Adult Regular)  Pulse 70  Temp 97  F (36.1  C) (Oral)  Ht 5' 6\" (1.676 m)  Wt 132 lb (59.9 kg)  SpO2 100%  BMI 21.31 kg/m2  Body mass index is 21.31 kg/(m^2).  GENERAL: healthy, alert and no distress  EYES: PERRL, EOM normal, conjunctiva and lids normal, conjunctivae/corneas - pterygium OD. I don't think he has any sclericterus.  HENT: right TM normal with small amount of non-obstructive cerumen present  RESP: lungs clear to auscultation - no rales, rhonchi or wheezes  CV: regular rate and rhythm, normal S1 S2, no S3 or S4, no murmur, click or rub, no peripheral edema and peripheral pulses strong  ABDOMEN: soft, no hepatosplenomegaly, no masses and bowel sounds normal, mildly tender LUQ, milder tenderness LLQ  MS: no gross musculoskeletal defects noted, no edema  SKIN: no suspicious lesions or rashes  NEURO: Normal strength and tone, mentation intact and speech " normal, cranial nerves 2-12 intact   PSYCH: mentation appears normal, affect normal/bright     ASSESSMENT/PLAN:                                                    (R10.12) Abdominal pain, left upper quadrant  (primary encounter diagnosis)  Comment: Postprandial pain suggestive of stomach or pancreas problem.   Plan: CBC with platelets differential, Hepatic panel,        Amylase, CT Abdomen Pelvis w Contrast, H Pylori        antigen, stool        Follow up depends on study results    (H57.8) Eye irritation  Comment: No significant findings, and no response to antibiotic drops.   Plan: Since the symptoms developed 3 days after he saw his optometrist, I want him to return to her.        The information in this document, created by the medical scribe for me, accurately reflects the services I personally performed and the decisions made by me. I have reviewed and approved this document for accuracy prior to leaving the patient care area.    Fidencio Cote MD

## 2017-04-04 NOTE — NURSING NOTE
"Chief Complaint   Patient presents with     Abdominal Pain     Eye Problem       Initial /80 (BP Location: Left arm, Patient Position: Chair, Cuff Size: Adult Regular)  Pulse 70  Temp 97  F (36.1  C) (Oral)  Ht 5' 6\" (1.676 m)  Wt 132 lb (59.9 kg)  SpO2 100%  BMI 21.31 kg/m2 Estimated body mass index is 21.31 kg/(m^2) as calculated from the following:    Height as of this encounter: 5' 6\" (1.676 m).    Weight as of this encounter: 132 lb (59.9 kg).  Medication Reconciliation: danita Lozoya MA      "

## 2017-04-05 ENCOUNTER — RADIANT APPOINTMENT (OUTPATIENT)
Dept: CT IMAGING | Facility: CLINIC | Age: 72
End: 2017-04-05
Attending: FAMILY MEDICINE
Payer: COMMERCIAL

## 2017-04-05 DIAGNOSIS — R10.12 ABDOMINAL PAIN, LEFT UPPER QUADRANT: ICD-10-CM

## 2017-04-05 PROCEDURE — 82565 ASSAY OF CREATININE: CPT | Performed by: FAMILY MEDICINE

## 2017-04-05 PROCEDURE — 74177 CT ABD & PELVIS W/CONTRAST: CPT | Performed by: RADIOLOGY

## 2017-04-05 PROCEDURE — 36415 COLL VENOUS BLD VENIPUNCTURE: CPT | Performed by: FAMILY MEDICINE

## 2017-04-05 RX ORDER — IOPAMIDOL 755 MG/ML
81 INJECTION, SOLUTION INTRAVASCULAR ONCE
Status: COMPLETED | OUTPATIENT
Start: 2017-04-05 | End: 2017-04-05

## 2017-04-05 RX ADMIN — IOPAMIDOL 81 ML: 755 INJECTION, SOLUTION INTRAVASCULAR at 11:53

## 2017-04-06 LAB
H PYLORI AG STL QL IA: NORMAL
MICRO REPORT STATUS: NORMAL
SPECIMEN SOURCE: NORMAL

## 2017-04-10 ENCOUNTER — OFFICE VISIT (OUTPATIENT)
Dept: FAMILY MEDICINE | Facility: CLINIC | Age: 72
End: 2017-04-10
Payer: COMMERCIAL

## 2017-04-10 VITALS
HEIGHT: 66 IN | OXYGEN SATURATION: 100 % | TEMPERATURE: 97.3 F | SYSTOLIC BLOOD PRESSURE: 126 MMHG | HEART RATE: 73 BPM | WEIGHT: 135 LBS | BODY MASS INDEX: 21.69 KG/M2 | DIASTOLIC BLOOD PRESSURE: 78 MMHG

## 2017-04-10 DIAGNOSIS — R74.8 ELEVATED AMYLASE: ICD-10-CM

## 2017-04-10 DIAGNOSIS — R10.12 LUQ ABDOMINAL PAIN: Primary | ICD-10-CM

## 2017-04-10 PROCEDURE — 99213 OFFICE O/P EST LOW 20 MIN: CPT | Performed by: FAMILY MEDICINE

## 2017-04-10 ASSESSMENT — PAIN SCALES - GENERAL: PAINLEVEL: NO PAIN (0)

## 2017-04-10 NOTE — NURSING NOTE
"Chief Complaint   Patient presents with     Results     Blood work & CT       Initial /78 (BP Location: Left arm, Patient Position: Chair, Cuff Size: Adult Regular)  Pulse 73  Temp 97.3  F (36.3  C) (Oral)  Ht 5' 6\" (1.676 m)  Wt 135 lb (61.2 kg)  SpO2 100%  BMI 21.79 kg/m2 Estimated body mass index is 21.79 kg/(m^2) as calculated from the following:    Height as of this encounter: 5' 6\" (1.676 m).    Weight as of this encounter: 135 lb (61.2 kg).  Medication Reconciliation: complete   KOFI Lozoya MA      "

## 2017-04-10 NOTE — MR AVS SNAPSHOT
"              After Visit Summary   4/10/2017    Stephon Bryant    MRN: [id number]           Patient Information     Date Of Birth          1945        Visit Information        Provider Department      4/10/2017 10:40 AM Fidencio Cote MD [facility]        Today's Diagnoses     LUQ abdominal pain    -  1    Elevated amylase           Follow-ups after your visit        Who to contact     If you have questions or need follow up information about today's clinic visit or your schedule please contact [facility] directly at [phone].  Normal or non-critical lab and imaging results will be communicated to you by Aip[employer], letter or phone within 4 business days after the clinic has received the results. If you do not hear from us within 7 days, please contact the clinic through [facility] or phone. If you have a critical or abnormal lab result, we will notify you by phone as soon as possible.  Submit refill requests through [employer] or call your pharmacy and they will forward the refill request to us. Please allow 3 business days for your refill to be completed.          Additional Information About Your Visit        MyChart Information     [employer] gives you secure access to your electronic health record. If you see a primary care provider, you can also send messages to your care team and make appointments. If you have questions, please call your primary care clinic.  If you do not have a primary care provider, please call [phone] and they will assist you.        Care EveryWhere ID     This is your Care EveryWhere ID. This could be used by other organizations to access your [place] medical records  [id number]        Your Vitals Were     Pulse Temperature Height Pulse Oximetry BMI (Body Mass Index)       73 97.3  F (36.3  C) (Oral) 5' 6\" (1.676 m) 100% 21.79 kg/m2        Blood Pressure from Last 3 Encounters:   04/10/17 126/78   04/04/17 131/80   03/27/17 98/67    Weight " from Last 3 Encounters:   04/10/17 135 lb (61.2 kg)   04/04/17 132 lb (59.9 kg)   03/27/17 136 lb (61.7 kg)              Today, you had the following     No orders found for display       Primary Care Provider Office Phone # Fax #    Fidencio Cote [phone]       [facility] [address]  [facility] [address]        Thank you!     Thank you for choosing [facility]  for your care. Our goal is always to provide you with excellent care. Hearing back from our patients is one way we can continue to improve our services. Please take a few minutes to complete the written survey that you may receive in the mail after your visit with us. Thank you!             Your Updated Medication List - Protect others around you: Learn how to safely use, store and throw away your medicines at www.disposemymeds.org.          This list is accurate as of: 4/10/17 11:26 AM.  Always use your most recent med list.                   Brand Name Dispense Instructions for use    aspirin 81 MG tablet      Take 1 tablet by mouth daily *.       BIOFLEX PO      Take 1 capful by mouth 1 capsule every other day.       blood glucose monitoring lancets     1 Box    Use to test blood sugar 2 times daily or as directed.  Ok to substitute alternative if insurance prefers.       blood glucose monitoring test strip    ONE TOUCH VERIO IQ    100 strip    Use to test blood sugar 2 times daily or as directed.  Ok to substitute alternative if insurance prefers.       cholecalciferol 5000 UNITS Caps capsule    vitamin D3     Take by mouth daily       etodolac 400 MG tablet    LODINE    60 tablet    Take 1 tablet (400 mg) by mouth 2 times daily (with meals) as needed for joint pain.       FISH OIL      1 capsule every other day       lisinopril 2.5 MG tablet    PRINIVIL/Zestril    90 tablet    Take 1 tablet (2.5 mg) by mouth daily for kidneys in diabetes.       meclizine 25 MG tablet    ANTIVERT    30 tablet     Take 1 tablet (25 mg) by mouth every 6 hours as needed for dizziness       MULTIVITAMIN PO      1 tab every other day       omeprazole 20 MG CR capsule    priLOSEC    90 capsule    Take 1 capsule (20 mg) by mouth daily for reflux.       oxybutynin 5 MG tablet    DITROPAN    90 tablet    Take 1 tablet (5 mg) by mouth every evening to decrease urinary frequency.       pravastatin 40 MG tablet    PRAVACHOL    90 tablet    TAKE 1 TABLET (40 MG) BY MOUTH EVERY EVENING FOR CHOLESTEROL.       scopolamine 72 hr patch    TRANSDERM    4 patch    Apply to hairless area behind one ear at least 4 hours before travel.  Remove old patch and change every 3 days.       traZODone 50 MG tablet    DESYREL    90 tablet    Take 1 tablet (50 mg) by mouth nightly as needed for sleep

## 2017-04-10 NOTE — PROGRESS NOTES
"  SUBJECTIVE:                                                    Stephon Bryant is a 72 year old male who presents to clinic today for the following health issues:    Over 50% of this 20-minute visit is spent face-to-face with the patient, counseling him on interpretation of study/lab results and on my recommended treatment of his various problems and coordination of his care, as summarized below:       Consult test results.      Past medical, family, and social histories, medications, and allergies are reviewed and updated in Epic.     ROS:  Constitutional, HEENT, cardiovascular, pulmonary, gi and gu systems are negative, except as otherwise noted.      Any history above obtained by the Medical Assistant was reviewed by Dr. Fidencio Cote MD, and edited when necessary.    This document serves as a record of the services and decisions personally performed and made by Dr. Cote. It was created on his behalf by Kiara Cardoso, a trained medical scribe. The creation of this document is based on the provider's statements to the medical scribe.  Kiara Cardoso April 10, 2017 10:53 AM   OBJECTIVE:                                                    /78 (BP Location: Left arm, Patient Position: Chair, Cuff Size: Adult Regular)  Pulse 73  Temp 97.3  F (36.3  C) (Oral)  Ht 5' 6\" (1.676 m)  Wt 135 lb (61.2 kg)  SpO2 100%  BMI 21.79 kg/m2  Body mass index is 21.79 kg/(m^2).  GENERAL: healthy, alert and no distress  EYES: Eyes grossly normal to inspection, PERRL and conjunctivae and sclerae normal  MS: no gross musculoskeletal defects noted, no edema  SKIN: no suspicious lesions or rashes  NEURO: Normal strength and tone, mentation intact and speech normal, cranial nerves 2-12 intact   PSYCH: mentation appears normal, affect normal/bright       Diagnostic Test Results:  Results for orders placed or performed in visit on 04/05/17   CT Abdomen Pelvis w Contrast    Narrative    EXAMINATION: CT ABDOMEN PELVIS W CONTRAST, " 4/5/2017 12:58 PM    TECHNIQUE:  Helical CT images from the lung bases through the  symphysis pubis were obtained with IV contrast. Contrast dose: 81 ml  isovue 370    COMPARISON: Abdominal MRI on 1/3/2014. CT of the abdomen and pelvis on  12/4/2013. Abdominal ultrasound on 9/14/2015.    HISTORY: postprandial LUQ pains, some tenderness, Left upper quadrant  pain    FINDINGS:    Abdomen/pelvis:   Stable subcentimeter hypodensities in the caudate and left hepatic  lobes, which are too small to characterize but likely likely  representing cysts. Mild hypoattenuation along the ligamentum teres  likely representing focal fat deposition. Punctate hypodensities in  the kidneys bilaterally are too small to characterize, but  statistically also likely cysts. No hydronephrosis or hydroureter.  Normal bilateral nephrograms. There is an accessory right renal  artery. The pancreas, adrenal glands and spleen are unremarkable.    The stomach is within normal limits. The small bowel is nondistended.  Likely small duodenal diverticulum. The appendix is unremarkable.  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 No  extraluminal air or free fluid.  No suspicious adenopathy. Mild  aortobiiliac calcified noncalcified atherosclerotic plaques without  significant stenosis. The prostate is mildly enlarged. The bladder is  mildly distended and otherwise unremarkable.    Bones/soft tissues: The soft tissues are within normal limits.  Sclerotic focus in the sacrum likely representing a bone island. No  suspicious osseous lesions. Minimal degenerative changes of the lumbar  spine and hips.     Lung bases:  The lung bases are clear.       Impression    IMPRESSION:   1. No definite CT explanation for patient's pain.   2. Possible sigmoid wall thickening-There is mild thickening of  the  sigmoid colon wall without surrounding inflammation. Differential  includes colitis, mild diverticulitis, normal decompressed colon, less  likely underlying mass. If clinically appropriate, consider  colonoscopy.    I have personally reviewed the examination and initial interpretation  and I agree with the findings.    STEPHEN TIDWELL MD       Results from 4/4/17   Ref. Range 4/4/2017 16:19   Albumin Latest Ref Range: 3.4 - 5.0 g/dL 4.2   Protein Total Latest Ref Range: 6.8 - 8.8 g/dL 7.5   Bilirubin Total Latest Ref Range: 0.2 - 1.3 mg/dL 0.5   Alkaline Phosphatase Latest Ref Range: 40 - 150 U/L 61   ALT Latest Ref Range: 0 - 70 U/L 33   AST Latest Ref Range: 0 - 45 U/L 25   Amylase Latest Ref Range: 30 - 110 U/L 123 (H)   Bilirubin Direct Latest Ref Range: 0.0 - 0.2 mg/dL 0.1   WBC Latest Ref Range: 4.0 - 11.0 10e9/L 5.9   Hemoglobin Latest Ref Range: 13.3 - 17.7 g/dL 14.9   Hematocrit Latest Ref Range: 40.0 - 53.0 % 45.7   Platelet Count Latest Ref Range: 150 - 450 10e9/L 181   RBC Count Latest Ref Range: 4.4 - 5.9 10e12/L 4.58   MCV Latest Ref Range: 78 - 100 fl 100   MCH Latest Ref Range: 26.5 - 33.0 pg 32.5   MCHC Latest Ref Range: 31.5 - 36.5 g/dL 32.6   RDW Latest Ref Range: 10.0 - 15.0 % 12.5   Diff Method Unknown Automated Method   % Neutrophils Latest Units: % 47.6   % Lymphocytes Latest Units: % 40.1   % Monocytes Latest Units: % 9.8   % Eosinophils Latest Units: % 2.2   % Basophils Latest Units: % 0.3   Absolute Neutrophil Latest Ref Range: 1.6 - 8.3 10e9/L 2.8   Absolute Lymphocytes Latest Ref Range: 0.8 - 5.3 10e9/L 2.4   Absolute Monocytes Latest Ref Range: 0.0 - 1.3 10e9/L 0.6   Absolute Eosinophils Latest Ref Range: 0.0 - 0.7 10e9/L 0.1   Absolute Basophils Latest Ref Range: 0.0 - 0.2 10e9/L 0.0      Ref. Range 4/4/2017 19:00   H Pylori Antigen Unknown Negative for Bhumika...        ASSESSMENT/PLAN:                                                    (R10.12) LUQ abdominal pain  (primary  encounter diagnosis)  (R74.8) Elevated amylase  Comment: This is a recurrent/chronic problem without a definite cause. His mildly increased amylase is unchanged from 3 years ago, therefore it's significance is unclear.   Plan: Handouts provided on Pancreatitis. Patient tells me he will think about this for a couple weeks, knowing he no longer has to worry about something acutely life threatening, and then call me back if he wants to meet with a GI doctor.       The information in this document, created by the medical scribe for me, accurately reflects the services I personally performed and the decisions made by me. I have reviewed and approved this document for accuracy prior to leaving the patient care area.    Fidencio Cote MD

## 2017-04-18 LAB
CREAT BLD-MCNC: 0.8 MG/DL (ref 0.66–1.25)
GFR SERPL CREATININE-BSD FRML MDRD: >90 ML/MIN/1.7M2

## 2017-06-05 DIAGNOSIS — E78.5 HYPERLIPIDEMIA LDL GOAL <130: ICD-10-CM

## 2017-06-05 NOTE — TELEPHONE ENCOUNTER
pravastatin (PRAVACHOL) 40 MG tablet     Last Written Prescription Date: 10/06/16  Last Fill Quantity: 90, # refills: 1  Last Office Visit with [facility], [facility] or [facility] prescribing provider: 04/10/17       Lab Results   Component Value Date    CHOL 210 10/06/2016     Lab Results   Component Value Date    HDL 70 10/06/2016     Lab Results   Component Value Date     10/06/2016     Lab Results   Component Value Date    TRIG 141 10/06/2016     Lab Results   Component Value Date    CHOLHDLRATIO 2.9 08/21/2015         [facility]

## 2017-06-07 RX ORDER — PRAVASTATIN SODIUM 40 MG
TABLET ORAL
Qty: 90 TABLET | Refills: 0 | Status: SHIPPED | OUTPATIENT
Start: 2017-06-07 | End: 2017-07-11

## 2017-06-07 NOTE — TELEPHONE ENCOUNTER
Prescription approved per [facility] Refill Protocol.  Saranya Salazar RN

## 2017-07-11 ENCOUNTER — OFFICE VISIT (OUTPATIENT)
Dept: FAMILY MEDICINE | Facility: CLINIC | Age: 72
End: 2017-07-11
Payer: COMMERCIAL

## 2017-07-11 VITALS
TEMPERATURE: 98.5 F | HEIGHT: 66 IN | SYSTOLIC BLOOD PRESSURE: 133 MMHG | OXYGEN SATURATION: 98 % | HEART RATE: 68 BPM | WEIGHT: 133 LBS | BODY MASS INDEX: 21.38 KG/M2 | DIASTOLIC BLOOD PRESSURE: 79 MMHG

## 2017-07-11 DIAGNOSIS — G47.00 PERSISTENT DISORDER OF INITIATING OR MAINTAINING SLEEP: Chronic | ICD-10-CM

## 2017-07-11 DIAGNOSIS — Z71.89 ADVANCE CARE PLANNING: ICD-10-CM

## 2017-07-11 DIAGNOSIS — Z00.00 ENCOUNTER FOR ROUTINE ADULT HEALTH EXAMINATION WITHOUT ABNORMAL FINDINGS: Primary | ICD-10-CM

## 2017-07-11 DIAGNOSIS — E78.5 HYPERLIPIDEMIA LDL GOAL <100: ICD-10-CM

## 2017-07-11 DIAGNOSIS — E11.9 TYPE 2 DIABETES MELLITUS WITHOUT COMPLICATION, WITHOUT LONG-TERM CURRENT USE OF INSULIN (H): ICD-10-CM

## 2017-07-11 LAB
ALT SERPL W P-5'-P-CCNC: 31 U/L (ref 0–70)
CHOLEST SERPL-MCNC: 155 MG/DL
HBA1C MFR BLD: 5.4 % (ref 4.3–6)
HDLC SERPL-MCNC: 52 MG/DL
LDLC SERPL CALC-MCNC: 83 MG/DL
NONHDLC SERPL-MCNC: 103 MG/DL
TRIGL SERPL-MCNC: 101 MG/DL

## 2017-07-11 PROCEDURE — 80061 LIPID PANEL: CPT | Performed by: FAMILY MEDICINE

## 2017-07-11 PROCEDURE — 84460 ALANINE AMINO (ALT) (SGPT): CPT | Performed by: FAMILY MEDICINE

## 2017-07-11 PROCEDURE — 83036 HEMOGLOBIN GLYCOSYLATED A1C: CPT | Performed by: FAMILY MEDICINE

## 2017-07-11 PROCEDURE — 99397 PER PM REEVAL EST PAT 65+ YR: CPT | Performed by: FAMILY MEDICINE

## 2017-07-11 PROCEDURE — 99213 OFFICE O/P EST LOW 20 MIN: CPT | Mod: 25 | Performed by: FAMILY MEDICINE

## 2017-07-11 PROCEDURE — 36415 COLL VENOUS BLD VENIPUNCTURE: CPT | Performed by: FAMILY MEDICINE

## 2017-07-11 RX ORDER — PRAVASTATIN SODIUM 40 MG
40 TABLET ORAL EVERY EVENING
Qty: 90 TABLET | Refills: 1 | Status: SHIPPED | OUTPATIENT
Start: 2017-07-11 | End: 2018-01-22

## 2017-07-11 RX ORDER — TRAZODONE HYDROCHLORIDE 50 MG/1
50 TABLET, FILM COATED ORAL
Qty: 90 TABLET | Refills: 1 | Status: SHIPPED | OUTPATIENT
Start: 2017-07-11 | End: 2018-01-22

## 2017-07-11 ASSESSMENT — PAIN SCALES - GENERAL: PAINLEVEL: NO PAIN (0)

## 2017-07-11 NOTE — NURSING NOTE
"Chief Complaint   Patient presents with     Physical       Initial /79 (BP Location: Left arm, Patient Position: Chair, Cuff Size: Adult Regular)  Pulse 68  Temp 98.5  F (36.9  C) (Oral)  Ht 5' 6\" (1.676 m)  Wt 133 lb (60.3 kg)  SpO2 98%  BMI 21.47 kg/m2 Estimated body mass index is 21.47 kg/(m^2) as calculated from the following:    Height as of this encounter: 5' 6\" (1.676 m).    Weight as of this encounter: 133 lb (60.3 kg).  Medication Reconciliation: complete   KOFI Lozoya MA      "

## 2017-07-11 NOTE — MR AVS SNAPSHOT
After Visit Summary   7/11/2017    Stephon Bryant    MRN: [id number]           Patient Information     Date Of Birth          1945        Visit Information        Provider Department      7/11/2017 11:00 AM Fidencio Cote MD [facility]        Today's Diagnoses     Encounter for routine adult health examination without abnormal findings    -  1    Type 2 diabetes mellitus without complication, without long-term current use of insulin (H)        Hyperlipidemia LDL goal <100        Persistent disorder of initiating or maintaining sleep          Care Instructions      Preventive Health Recommendations:       Male Ages 65 and over    Yearly exam:             See your health care provider every year in order to  o   Review health changes.   o   Discuss preventive care.    o   Review your medicines if your doctor has prescribed any.    Talk with your health care provider about whether you should have a test to screen for prostate cancer (PSA).    Every 3 years, have a diabetes test (fasting glucose). If you are at risk for diabetes, you should have this test more often.    Every 5 years, have a cholesterol test. Have this test more often if you are at risk for high cholesterol or heart disease.     Every 10 years, have a colonoscopy. Or, have a yearly FIT test (stool test). These exams will check for colon cancer.    Talk to with your health care provider about screening for Abdominal Aortic Aneurysm if you have a family history of AAA or have a history of smoking.  Shots:     Get a flu shot each year.     Get a tetanus shot every 10 years.     Talk to your doctor about your pneumonia vaccines. There are now two you should receive - Pneumovax (PPSV 23) and Prevnar (PCV 13).    Talk to your doctor about a shingles vaccine.     Talk to your doctor about the hepatitis B vaccine.  Nutrition:     Eat at least 5 servings of fruits and vegetables each day.     Eat whole-grain bread,  whole-wheat pasta and brown rice instead of white grains and rice.     Talk to your doctor about Calcium and Vitamin D.   Lifestyle    Exercise for at least 150 minutes a week (30 minutes a day, 5 days a week). This will help you control your weight and prevent disease.     Limit alcohol to one drink per day.     No smoking.     Wear sunscreen to prevent skin cancer.     See your dentist every six months for an exam and cleaning.     See your eye doctor every 1 to 2 years to screen for conditions such as glaucoma, macular degeneration and cataracts.      ================================================================================  Normal Values   Blood pressure  <140/90 for most adults    <130/80 for some chronic diseases (ask your care team about yours)    BMI (body mass index)  18.5-25 kg/m2 (based on height and weight)     Thank you for visiting [facility]    Normal or non-critical lab and imaging results will be communicated to you by MyChart, letter or phone within 7 days.  If you do not hear from us within 10 days, please call the clinic. If you have a critical or abnormal lab result, we will notify you by phone as soon as possible.     If you have any questions regarding your visit please contact:     Team Comfort:   Clinic Hours Telephone Number   Dr. Fidencio Castro 7am-5pm  Monday - Friday [phone]  Christiano Beaver RN   Pharmacy 8:00am-8pm Monday-Friday    9am-5pm Saturday-Sunday [phone]   Urgent Care 11am-9pm Monday-Friday        9am-5pm Saturday-Sunday [phone]     After hours, weekend or if you need to make an appointment with your primary provider please call [phone].   After Hours nurse advise: call [facility]: [phone]    Medication Refills:  Call your pharmacy and they will forward the refill to us. Please allow 3 business days for your refills to be  "completed.                      Follow-ups after your visit        Follow-up notes from your care team     Return in about 6 months (around 1/4/2018) for diabetes.      Who to contact     If you have questions or need follow up information about today's clinic visit or your schedule please contact [facility] ALEX SHONA directly at [phone].  Normal or non-critical lab and imaging results will be communicated to you by [email or web address], letter or phone within 4 business days after the clinic has received the results. If you do not hear from us within 7 days, please contact the clinic through [email or web address] or phone. If you have a critical or abnormal lab result, we will notify you by phone as soon as possible.  Submit refill requests through Rhapsody or call your pharmacy and they will forward the refill request to us. Please allow 3 business days for your refill to be completed.          Additional Information About Your Visit        [email or web address] Information     Rhapsody gives you secure access to your electronic health record. If you see a primary care provider, you can also send messages to your care team and make appointments. If you have questions, please call your primary care clinic.  If you do not have a primary care provider, please call [phone] and they will assist you.        Care EveryWhere ID     This is your Care EveryWhere ID. This could be used by other organizations to access your [place] medical records  [id number]        Your Vitals Were     Pulse Temperature Height Pulse Oximetry BMI (Body Mass Index)       68 98.5  F (36.9  C) (Oral) 5' 6\" (1.676 m) 98% 21.47 kg/m2        Blood Pressure from Last 3 Encounters:   07/11/17 133/79   04/10/17 126/78   04/04/17 131/80    Weight from Last 3 Encounters:   07/11/17 133 lb (60.3 kg)   04/10/17 135 lb (61.2 kg)   04/04/17 132 lb (59.9 kg)              We Performed the Following     ALT     HEMOGLOBIN A1C     Lipid panel reflex to direct LDL          Today's " Medication Changes          These changes are accurate as of: 7/11/17 11:51 AM.  If you have any questions, ask your nurse or doctor.               These medicines have changed or have updated prescriptions.        Dose/Directions    pravastatin 40 MG tablet   Commonly known as:  PRAVACHOL   This may have changed:  See the new instructions.   Used for:  Hyperlipidemia LDL goal <100   Changed by:  Fidencio Cote MD        Dose:  40 mg   Take 1 tablet (40 mg) by mouth every evening for cholesterol.   Quantity:  90 tablet   Refills:  1            Where to get your medicines      These medications were sent to [id number] - [place], [place] - [address]  [address] [address]     Phone:  [phone]     pravastatin 40 MG tablet    traZODone 50 MG tablet                Primary Care Provider Office Phone # Fax #    Fidencio Cote [phone]       [facility] [address]  [facility] [address]        Equal Access to Services     [facility]: Hadii aad ku hadasho Soomaali, waaxda luqadaha, qaybta kaalmada adeegyada, waxay idiin hayaan adeeg kharash la'veronica . So [facility] [phone].    ATENCIÓN: Si habla español, tiene a sims disposición servicios gratuitos de asistencia lingüística. Ll[facility] [phone].    We comply with applicable federal civil rights laws and [place] laws. We do not discriminate on the basis of race, color, national origin, age, disability sex, sexual orientation or gender identity.            Thank you!     Thank you for choosing [facility]  for your care. Our goal is always to provide you with excellent care. Hearing back from our patients is one way we can continue to improve our services. Please take a few minutes to complete the written survey that you may receive in the mail after your visit with us. Thank you!             Your Updated Medication List - Protect others around you: Learn how to safely  use, store and throw away your medicines at www.disposemymeds.org.          This list is accurate as of: 7/11/17 11:51 AM.  Always use your most recent med list.                   Brand Name Dispense Instructions for use Diagnosis    aspirin 81 MG tablet      Take 1 tablet by mouth daily *.        BIOFLEX PO      Take 1 capful by mouth 1 capsule every other day.        blood glucose monitoring lancets     1 Box    Use to test blood sugar 2 times daily or as directed.  Ok to substitute alternative if insurance prefers.    Type 2 diabetes mellitus without complication, without long-term current use of insulin (H)       blood glucose monitoring test strip    ONE TOUCH VERIO IQ    100 strip    Use to test blood sugar 2 times daily or as directed.  Ok to substitute alternative if insurance prefers.    Type 2 diabetes mellitus without complication, without long-term current use of insulin (H)       cholecalciferol 5000 UNITS Caps capsule    vitamin D3     Take by mouth daily        FISH OIL      1 capsule every other day        lisinopril 2.5 MG tablet    PRINIVIL/Zestril    90 tablet    Take 1 tablet (2.5 mg) by mouth daily for kidneys in diabetes.    Type 2 diabetes mellitus without complication, without long-term current use of insulin (H)       MULTIVITAMIN PO      1 tab every other day        omeprazole 20 MG CR capsule    priLOSEC    90 capsule    Take 1 capsule (20 mg) by mouth daily for reflux.    GERD (gastroesophageal reflux disease)       pravastatin 40 MG tablet    PRAVACHOL    90 tablet    Take 1 tablet (40 mg) by mouth every evening for cholesterol.    Hyperlipidemia LDL goal <100       traZODone 50 MG tablet    DESYREL    90 tablet    Take 1 tablet (50 mg) by mouth nightly as needed for sleep    Persistent disorder of initiating or maintaining sleep

## 2017-07-11 NOTE — PROGRESS NOTES
SUBJECTIVE:   Stephon Bryant is a 72 year old male who presents for Preventive Visit.      Are you in the first 12 months of your Medicare Part B coverage?  No    Healthy Habits:    Do you get at least three servings of calcium containing foods daily (dairy, green leafy vegetables, etc.)? yes    Amount of exercise or daily activities, outside of work: 4-5 day(s) per week    Problems taking medications regularly No    Medication side effects: No    Have you had an eye exam in the past two years? yes    Do you see a dentist twice per year? No once    Do you have sleep apnea, excessive snoring or daytime drowsiness?no    COGNITIVE SCREEN  1) Repeat 3 items (Banana, Sunrise, Chair)  YES  2) Clock draw: NORMAL  3) 3 item recall: Recalls NO objects   Results: 0 items recalled: PROBABLE COGNITIVE IMPAIRMENT, **INFORM PROVIDER** (Noted, his interaction with me is at his baseline without a suggestion of dementia. I suspect a language barrier may be confounding the results of this screen. I'll check this at a future visit.)    Mini-CogTM Copyright ROXY Banegas. Licensed by the author for use in [facility]; reprinted with permission (young@[facility]). All rights reserved.      Reviewed and updated as needed this visit by clinical staff  Tobacco  Allergies  Meds  Problems  Med Hx  Surg Hx  Fam Hx  Soc Hx        Reviewed and updated as needed this visit by Provider        Social History   Substance Use Topics     Smoking status: Former Smoker     Packs/day: 0.30     Years: 7.00     Types: Cigarettes     Smokeless tobacco: Never Used      Comment: [id number] (in service)     Alcohol use No       The patient does not drink >3 drinks per day nor >7 drinks per week.    Today's PHQ-2 Score:   PHQ-2 ( 1999 [employer]) 4/4/2017 1/9/2017   Q1: Little interest or pleasure in doing things 0 0   Q2: Feeling down, depressed or hopeless 0 0   PHQ-2 Score 0 0       Do you feel safe in your environment - YES    Do you have a Health Care  Directive?: No: Advance care planning reviewed with patient; information given to patient to review.    Current providers sharing in care for this patient include:   Patient Care Team:  Fidencio Cote MD as PCP - General  Amy Cummins, RN as       Hearing impairment: No    Ability to successfully perform activities of daily living: Yes, no assistance needed     Fall risk:       Home safety:  none identified      The following health maintenance items are reviewed in Epic and correct as of today:  Health Maintenance   Topic Date Due     A1C Q6 MO  06/08/2017     INFLUENZA VACCINE (SYSTEM ASSIGNED)  09/01/2017     LIPID MONITORING Q1 YEAR  10/06/2017     MICROALBUMIN Q1 YEAR  12/08/2017     DIABETIC EDUCATION Q1 YEAR  12/15/2017     FOOT EXAM Q1 YEAR  01/09/2018     EYE EXAM Q1 YEAR  03/14/2018     CREATININE Q1 YEAR  04/05/2018     FALL RISK ASSESSMENT  04/10/2018     ADVANCE DIRECTIVE PLANNING Q5 YRS  09/05/2018     TSH W/ FREE T4 REFLEX Q2 YEAR  10/06/2018     TETANUS IMMUNIZATION (SYSTEM ASSIGNED)  11/30/2022     COLON CANCER SCREEN (SYSTEM ASSIGNED)  12/18/2023     PNEUMOCOCCAL  Completed     AORTIC ANEURYSM SCREENING (SYSTEM ASSIGNED)  Completed     HEPATITIS C SCREENING  Completed     Past medical, family, and social histories, medications, and allergies are reviewed and updated in Epic.     ROS:  Constitutional, HEENT, cardiovascular, pulmonary, GI, , musculoskeletal, neuro, skin, endocrine and psych systems are negative, except as otherwise noted.    Any history above obtained by the Medical Assistant was reviewed by Dr. Fidencio Cote MD, and edited when necessary.    This document serves as a record of the services and decisions personally performed and made by Dr. Cote. It was created on his behalf by Carmella Olvera, a trained medical scribe. The creation of this document is based the provider's statements to the medical scribe.  Carmella Olvera July 11, 2017 11:36 AM     "    OBJECTIVE:   /79 (BP Location: Left arm, Patient Position: Chair, Cuff Size: Adult Regular)  Pulse 68  Temp 98.5  F (36.9  C) (Oral)  Ht 5' 6\" (1.676 m)  Wt 133 lb (60.3 kg)  SpO2 98%  BMI 21.47 kg/m2 Estimated body mass index is 21.47 kg/(m^2) as calculated from the following:    Height as of this encounter: 5' 6\" (1.676 m).    Weight as of this encounter: 133 lb (60.3 kg).    EXAM:  GENERAL: healthy, alert and no distress  EYES: Eyes grossly normal to inspection, PERRL and conjunctivae and sclerae normal. bilateral arcus and right pterygium.  HENT: ear canals normal, nose and mouth without ulcers or lesions. Chronic perforation of left TM.  NECK: no adenopathy, no asymmetry, masses, or scars and thyroid normal to palpation  RESP: lungs clear to ascultation, no crackles or wheezes, no areas of dullness, no tachypnea   CV: regular rate and rhythm, normal S1 S2, no S3 or S4, no murmur, click or rub, no peripheral edema and peripheral pulses strong  ABDOMEN: soft, nontender, no hepatosplenomegaly, no masses and bowel sounds normal   (male): normal male genitalia without lesions or urethral discharge, no hernia  MS: no gross musculoskeletal defects noted, no edema  SKIN: no suspicious lesions or rashes  NEURO: Normal strength and tone, mentation intact and speech normal, DTRs symmetrical, cranial nerves 2-12 intact   PSYCH: mentation appears normal, affect normal/bright     Diagnostic Test Results:  Results for orders placed or performed in visit on 07/11/17 (from the past 24 hour(s))   HEMOGLOBIN A1C   Result Value Ref Range    Hemoglobin A1C 5.4 4.3 - 6.0 %      Lab Results   Component Value Date    A1C 5.4 07/11/2017    A1C 6.9 12/08/2016    A1C 5.7 08/01/2014    A1C 5.4 11/11/2013        ASSESSMENT / PLAN:   (Z00.00) Encounter for routine adult health examination without abnormal findings  (primary encounter diagnosis)  Comment: Negative screening exam; up-to-date on preventive services.   Plan: " "Follow up in 1 year. BMI table provided.    (E11.9) Type 2 diabetes mellitus without complication, without long-term current use of insulin (H)  Comment: Controlled.  Plan: HEMOGLOBIN A1C, lisinopril (PRINIVIL/ZESTRIL)         2.5 MG tablet        Follow up in 6 months.    (E78.5) Hyperlipidemia LDL goal <100  Comment: Fasting. His last LDL was 112.   Plan: Lipid panel reflex to direct LDL, pravastatin         (PRAVACHOL) 40 MG tablet, ALT        Follow up in 6 months.    (G47.00) Persistent disorder of initiating or maintaining sleep  Comment: prescription update   Plan: traZODone (DESYREL) 50 MG tablet            End of Life Planning:  Patient currently has an advanced directive: No.  I have verified the patient's ablity to prepare an advanced directive/make health care decisions.  Literature was provided to assist patient in preparing an advanced directive.    COUNSELING:  Reviewed preventive health counseling, as reflected in patient instructions    BP Screening:   Last 3 BP Readings:    BP Readings from Last 3 Encounters:   07/11/17 133/79   04/10/17 126/78   04/04/17 131/80       The following was recommended to the patient:  Re-screen BP within a year and recommended lifestyle modifications    Estimated body mass index is 21.47 kg/(m^2) as calculated from the following:    Height as of this encounter: 5' 6\" (1.676 m).    Weight as of this encounter: 133 lb (60.3 kg).      reports that he has quit smoking. His smoking use included Cigarettes. He has a 2.10 pack-year smoking history. He has never used smokeless tobacco.      Appropriate preventive services were discussed with this patient, including applicable screening as appropriate for cardiovascular disease, diabetes, osteopenia/osteoporosis, and glaucoma.  As appropriate for age/gender, discussed screening for colorectal cancer, prostate cancer, breast cancer, and cervical cancer. Checklist reviewing preventive services available has been given to the " patient.    Reviewed patients plan of care and provided an AVS. The Basic Care Plan (routine screening as documented in Health Maintenance) for Stephon meets the Care Plan requirement. This Care Plan has been established and reviewed with the Patient.    Counseling Resources:  ATP IV Guidelines  Pooled Cohorts Equation Calculator  Breast Cancer Risk Calculator  FRAX Risk Assessment  ICSI Preventive Guidelines  Dietary Guidelines for Americans, 2010  [employer]'s MyPlate  ASA Prophylaxis  Lung CA Screening    The information in this document, created by the medical scribe for me, accurately reflects the services I personally performed and the decisions made by me. I have reviewed and approved this document for accuracy prior to leaving the patient care area. July 11, 2017 11:37 AM      Fidencio Cote MD

## 2017-07-11 NOTE — PATIENT INSTRUCTIONS
Preventive Health Recommendations:       Male Ages 65 and over    Yearly exam:             See your health care provider every year in order to  o   Review health changes.   o   Discuss preventive care.    o   Review your medicines if your doctor has prescribed any.    Talk with your health care provider about whether you should have a test to screen for prostate cancer (PSA).    Every 3 years, have a diabetes test (fasting glucose). If you are at risk for diabetes, you should have this test more often.    Every 5 years, have a cholesterol test. Have this test more often if you are at risk for high cholesterol or heart disease.     Every 10 years, have a colonoscopy. Or, have a yearly FIT test (stool test). These exams will check for colon cancer.    Talk to with your health care provider about screening for Abdominal Aortic Aneurysm if you have a family history of AAA or have a history of smoking.  Shots:     Get a flu shot each year.     Get a tetanus shot every 10 years.     Talk to your doctor about your pneumonia vaccines. There are now two you should receive - Pneumovax (PPSV 23) and Prevnar (PCV 13).    Talk to your doctor about a shingles vaccine.     Talk to your doctor about the hepatitis B vaccine.  Nutrition:     Eat at least 5 servings of fruits and vegetables each day.     Eat whole-grain bread, whole-wheat pasta and brown rice instead of white grains and rice.     Talk to your doctor about Calcium and Vitamin D.   Lifestyle    Exercise for at least 150 minutes a week (30 minutes a day, 5 days a week). This will help you control your weight and prevent disease.     Limit alcohol to one drink per day.     No smoking.     Wear sunscreen to prevent skin cancer.     See your dentist every six months for an exam and cleaning.     See your eye doctor every 1 to 2 years to screen for conditions such as glaucoma, macular degeneration and  cataracts.      ================================================================================  Normal Values   Blood pressure  <140/90 for most adults    <130/80 for some chronic diseases (ask your care team about yours)    BMI (body mass index)  18.5-25 kg/m2 (based on height and weight)     Thank you for visiting [facility]    Normal or non-critical lab and imaging results will be communicated to you by MyChart, letter or phone within 7 days.  If you do not hear from us within 10 days, please call the clinic. If you have a critical or abnormal lab result, we will notify you by phone as soon as possible.     If you have any questions regarding your visit please contact:     Team Comfort:   Clinic Hours Telephone Number   Dr. Fidencio Castro 7am-5pm  Monday - Friday [phone]  Christiano RN  Fela Beaver RN   Pharmacy 8:00am-8pm Monday-Friday    9am-5pm Saturday-Sunday [phone]   Urgent Care 11am-9pm Monday-Friday        9am-5pm Saturday-Sunday [phone]     After hours, weekend or if you need to make an appointment with your primary provider please call [phone].   After Hours nurse advise: call [facility]: [phone]    Medication Refills:  Call your pharmacy and they will forward the refill to us. Please allow 3 business days for your refills to be completed.

## 2017-07-12 RX ORDER — LISINOPRIL 2.5 MG/1
2.5 TABLET ORAL DAILY
Qty: 90 TABLET | Refills: 3 | Status: SHIPPED | OUTPATIENT
Start: 2017-07-12 | End: 2018-07-23

## 2017-07-14 ENCOUNTER — NURSE TRIAGE (OUTPATIENT)
Dept: NURSING | Facility: CLINIC | Age: 72
End: 2017-07-14

## 2017-07-14 NOTE — TELEPHONE ENCOUNTER
Reason for Disposition    Passing pure blood or large blood clots (i.e., size > a dime) (Exception: jennifer or small strands)    Additional Information    Negative: Shock suspected (e.g., cold/pale/clammy skin, too weak to stand, low BP, rapid pulse)    Negative: Sounds like a life-threatening emergency to the triager    Negative: Urinary catheter, questions about    Negative: Recent back or abdominal injury    Negative: Recent genital injury    Negative: [1] Unable to urinate (or only a few drops) > 4 hours AND [2] bladder feels very full (e.g., palpable bladder or strong urge to urinate)    Protocols used: URINE - BLOOD IN-ADULT-  Patient is having urinary incontinence and bloody urine.

## 2017-07-17 ENCOUNTER — OFFICE VISIT (OUTPATIENT)
Dept: FAMILY MEDICINE | Facility: CLINIC | Age: 72
End: 2017-07-17
Payer: COMMERCIAL

## 2017-07-17 VITALS
HEART RATE: 62 BPM | OXYGEN SATURATION: 100 % | BODY MASS INDEX: 21.38 KG/M2 | WEIGHT: 133 LBS | HEIGHT: 66 IN | SYSTOLIC BLOOD PRESSURE: 107 MMHG | DIASTOLIC BLOOD PRESSURE: 69 MMHG | TEMPERATURE: 97.1 F

## 2017-07-17 DIAGNOSIS — Z12.5 PROSTATE CANCER SCREENING: ICD-10-CM

## 2017-07-17 DIAGNOSIS — N30.01 ACUTE CYSTITIS WITH HEMATURIA: Primary | ICD-10-CM

## 2017-07-17 DIAGNOSIS — R97.20 ELEVATED PROSTATE SPECIFIC ANTIGEN (PSA): ICD-10-CM

## 2017-07-17 LAB
ALBUMIN UR-MCNC: NEGATIVE MG/DL
APPEARANCE UR: CLEAR
BACTERIA #/AREA URNS HPF: ABNORMAL /HPF
BILIRUB UR QL STRIP: NEGATIVE
COLOR UR AUTO: YELLOW
ERYTHROCYTE [DISTWIDTH] IN BLOOD BY AUTOMATED COUNT: 12.3 % (ref 10–15)
GLUCOSE UR STRIP-MCNC: NEGATIVE MG/DL
HCT VFR BLD AUTO: 43.7 % (ref 40–53)
HGB BLD-MCNC: 14.5 G/DL (ref 13.3–17.7)
HGB UR QL STRIP: ABNORMAL
KETONES UR STRIP-MCNC: NEGATIVE MG/DL
LEUKOCYTE ESTERASE UR QL STRIP: NEGATIVE
MCH RBC QN AUTO: 32.7 PG (ref 26.5–33)
MCHC RBC AUTO-ENTMCNC: 33.2 G/DL (ref 31.5–36.5)
MCV RBC AUTO: 99 FL (ref 78–100)
NITRATE UR QL: NEGATIVE
PH UR STRIP: 6 PH (ref 5–7)
PLATELET # BLD AUTO: 169 10E9/L (ref 150–450)
PSA SERPL-ACNC: 15.6 UG/L (ref 0–4)
RBC # BLD AUTO: 4.43 10E12/L (ref 4.4–5.9)
RBC #/AREA URNS AUTO: ABNORMAL /HPF (ref 0–2)
SP GR UR STRIP: 1.01 (ref 1–1.03)
URN SPEC COLLECT METH UR: ABNORMAL
UROBILINOGEN UR STRIP-ACNC: 0.2 EU/DL (ref 0.2–1)
WBC # BLD AUTO: 5.3 10E9/L (ref 4–11)
WBC #/AREA URNS AUTO: ABNORMAL /HPF (ref 0–2)

## 2017-07-17 PROCEDURE — 81001 URINALYSIS AUTO W/SCOPE: CPT | Performed by: FAMILY MEDICINE

## 2017-07-17 PROCEDURE — 85027 COMPLETE CBC AUTOMATED: CPT | Performed by: FAMILY MEDICINE

## 2017-07-17 PROCEDURE — 84153 ASSAY OF PSA TOTAL: CPT | Performed by: FAMILY MEDICINE

## 2017-07-17 PROCEDURE — 87086 URINE CULTURE/COLONY COUNT: CPT | Performed by: FAMILY MEDICINE

## 2017-07-17 PROCEDURE — 99214 OFFICE O/P EST MOD 30 MIN: CPT | Performed by: FAMILY MEDICINE

## 2017-07-17 PROCEDURE — 36415 COLL VENOUS BLD VENIPUNCTURE: CPT | Performed by: FAMILY MEDICINE

## 2017-07-17 ASSESSMENT — PAIN SCALES - GENERAL: PAINLEVEL: NO PAIN (0)

## 2017-07-17 NOTE — PATIENT INSTRUCTIONS
================================================================================  Normal Values   Blood pressure  <140/90 for most adults    <130/80 for some chronic diseases (ask your care team about yours)    BMI (body mass index)  18.5-25 kg/m2 (based on height and weight)     Thank you for visiting [facility]    Normal or non-critical lab and imaging results will be communicated to you by MyChart, letter or phone within 7 days.  If you do not hear from us within 10 days, please call the clinic. If you have a critical or abnormal lab result, we will notify you by phone as soon as possible.     If you have any questions regarding your visit please contact:     Team Comfort:   Clinic Hours Telephone Number   Dr. Fidencio Castro 7am-5pm  Monday - Friday [phone]  Christiano Beaver RN   Pharmacy 8:00am-8pm Monday-Friday    9am-5pm Saturday-Sunday [phone]   Urgent Care 11am-9pm Monday-Friday        9am-5pm Saturday-Sunday [phone]     After hours, weekend or if you need to make an appointment with your primary provider please call [phone].   After Hours nurse advise: call [facility]: [phone]    Medication Refills:  Call your pharmacy and they will forward the refill to us. Please allow 3 business days for your refills to be completed.

## 2017-07-17 NOTE — PROGRESS NOTES
SUBJECTIVE:                                                    Stephon Bryant is a 72 year old male who presents to clinic today for the following health issues:      Genitourinary - Male  Onset: 7/13/17    Description:   Dysuria (painful urination): YES  Hematuria (blood in urine): YES  Frequency: YES  Are you urinating at night : YES  Hesitancy (delay in urine): no   Retention (unable to empty): YES  Decrease in urinary flow: YES  Incontinence: no     Progression of Symptoms:  improving    Accompanying Signs & Symptoms:  Fever: no   Back/Flank pain: no   Urethral discharge: no   Testicle lumps/masses/pain: no   Nausea and/or vomiting: no   Abdominal pain: no     History:   History of frequent UTI's: YES  History of kidney stones: no   History of hernias: no   Personal or Family history of Prostate problems: no  Sexually active: YES    Precipitating factors:   none    Alleviating factors:  Seen in ER [facility]    He received sulfa at [facility] ED for his infection.    Past medical, family, and social histories, medications, and allergies are reviewed and updated in Epic.     ROS:  C: NEGATIVE for fever, chills, change in weight  E/M: NEGATIVE for ear, mouth and throat problems  R: NEGATIVE for significant cough or SOB  CV: NEGATIVE for chest pain, palpitations or peripheral edema  ROS otherwise negative    Any history above obtained by the Medical Assistant was reviewed by Dr. Fidencio Cote MD, and edited when necessary.    This document serves as a record of the services and decisions personally performed and made by Dr. Cote. It was created on his behalf by Carmella Olvera, a trained medical scribe. The creation of this document is based the provider's statements to the medical scribe.  Carmella Olvera July 17, 2017 2:09 PM     OBJECTIVE:                                                    /69 (BP Location: Left arm, Patient Position: Chair, Cuff Size: Adult Regular)  Pulse 62  Temp 97.1  F (36.2  " C) (Oral)  Ht 1.676 m (5' 6\")  Wt 60.3 kg (133 lb)  SpO2 100%  BMI 21.47 kg/m2   Body mass index is 21.47 kg/(m^2).     EXAM:  GENERAL: healthy, alert and no distress  EYES: Eyes grossly normal to inspection, PERRL, EOMI, sclerae white and conjunctivae normal  MS: no gross musculoskeletal defects noted, no edema  SKIN: no suspicious lesions or rashes  NEURO: Normal strength and tone, sensory exam grossly normal, mentation intact, oriented times 3 and cranial nerves 2-12 intact  PSYCH: mentation appears normal, affect normal/bright     Diagnostic Test Results:  Results for orders placed or performed in visit on 07/17/17 (from the past 24 hour(s))   CBC with platelets   Result Value Ref Range    WBC 5.3 4.0 - 11.0 10e9/L    RBC Count 4.43 4.4 - 5.9 10e12/L    Hemoglobin 14.5 13.3 - 17.7 g/dL    Hematocrit 43.7 40.0 - 53.0 %    MCV 99 78 - 100 fl    MCH 32.7 26.5 - 33.0 pg    MCHC 33.2 31.5 - 36.5 g/dL    RDW 12.3 10.0 - 15.0 %    Platelet Count 169 150 - 450 10e9/L   UA with Microscopic reflex to Culture   Result Value Ref Range    Color Urine Yellow     Appearance Urine Clear     Glucose Urine Negative NEG mg/dL    Bilirubin Urine Negative NEG    Ketones Urine Negative NEG mg/dL    Specific Gravity Urine 1.010 1.003 - 1.035    pH Urine 6.0 5.0 - 7.0 pH    Protein Albumin Urine Negative NEG mg/dL    Urobilinogen Urine 0.2 0.2 - 1.0 EU/dL    Nitrite Urine Negative NEG    Blood Urine Trace (A) NEG    Leukocyte Esterase Urine Negative NEG    Source Midstream Urine     WBC Urine O - 2 0 - 2 /HPF    RBC Urine O - 2 0 - 2 /HPF    Bacteria Urine Few (A) NEG /HPF             ASSESSMENT/PLAN:                                                      (N30.01) Acute cystitis with hematuria  (primary encounter diagnosis)  Comment: Clinically improving, and the UA is significantly improved.  Plan: UA with Microscopic reflex to Culture, Urine         Culture Aerobic Bacterial, Prostate spec         antigen screen, CBC with " platelets        Follow up as needed.     (Z12.5) Prostate cancer screening  Comment: Requested by patient.  Plan: Prostate spec antigen screen            The information in this document, created by the medical scribe for me, accurately reflects the services I personally performed and the decisions made by me. I have reviewed and approved this document for accuracy prior to leaving the patient care area. July 17, 2017 2:09 PM   Fidencio Cote MD

## 2017-07-17 NOTE — NURSING NOTE
"Chief Complaint   Patient presents with     UTI       Initial /69 (BP Location: Left arm, Patient Position: Chair, Cuff Size: Adult Regular)  Pulse 62  Temp 97.1  F (36.2  C) (Oral)  Ht 5' 6\" (1.676 m)  Wt 133 lb (60.3 kg)  SpO2 100%  BMI 21.47 kg/m2 Estimated body mass index is 21.47 kg/(m^2) as calculated from the following:    Height as of this encounter: 5' 6\" (1.676 m).    Weight as of this encounter: 133 lb (60.3 kg).  Medication Reconciliation: complete   KOFI Lozoya MA      "

## 2017-07-17 NOTE — MR AVS SNAPSHOT
After Visit Summary   7/17/2017    Stephon Bryant    MRN: [id number]           Patient Information     Date Of Birth          1945        Visit Information        Provider Department      7/17/2017 1:40 PM Fidencio Cote MD [facility]        Today's Diagnoses     Acute cystitis with hematuria    -  1    Prostate cancer screening          Care Instructions        ================================================================================  Normal Values   Blood pressure  <140/90 for most adults    <130/80 for some chronic diseases (ask your care team about yours)    BMI (body mass index)  18.5-25 kg/m2 (based on height and weight)     Thank you for visiting [facility]    Normal or non-critical lab and imaging results will be communicated to you by MyChart, letter or phone within 7 days.  If you do not hear from us within 10 days, please call the clinic. If you have a critical or abnormal lab result, we will notify you by phone as soon as possible.     If you have any questions regarding your visit please contact:     Team Comfort:   Clinic Hours Telephone Number   Dr. Fidencio Castro 7am-5pm  Monday - Friday [phone]  Christiano Beaver RN   Pharmacy 8:00am-8pm Monday-Friday    9am-5pm Saturday-Sunday [phone]   Urgent Care 11am-9pm Monday-Friday        9am-5pm Saturday-Sunday [phone]     After hours, weekend or if you need to make an appointment with your primary provider please call [phone].   After Hours nurse advise: call [facility]: [phone]    Medication Refills:  Call your pharmacy and they will forward the refill to us. Please allow 3 business days for your refills to be completed.                  Follow-ups after your visit        Follow-up notes from your care team     Return if symptoms worsen or fail to improve.      Who to contact   "   If you have questions or need follow up information about today's clinic visit or your schedule please contact [facility] directly at [phone].  Normal or non-critical lab and imaging results will be communicated to you by MyChart, letter or phone within 4 business days after the clinic has received the results. If you do not hear from us within 7 days, please contact the clinic through [employer] or phone. If you have a critical or abnormal lab result, we will notify you by phone as soon as possible.  Submit refill requests through [email or web address] or call your pharmacy and they will forward the refill request to us. Please allow 3 business days for your refill to be completed.          Additional Information About Your Visit        [email or web address] Information     [email or web address] gives you secure access to your electronic health record. If you see a primary care provider, you can also send messages to your care team and make appointments. If you have questions, please call your primary care clinic.  If you do not have a primary care provider, please call [phone] and they will assist you.        Care EveryWhere ID     This is your Care EveryWhere ID. This could be used by other organizations to access your [facility] medical records  [id number]        Your Vitals Were     Pulse Temperature Height Pulse Oximetry BMI (Body Mass Index)       62 97.1  F (36.2  C) (Oral) 1.676 m (5' 6\") 100% 21.47 kg/m2        Blood Pressure from Last 3 Encounters:   07/17/17 107/69   07/11/17 133/79   04/10/17 126/78    Weight from Last 3 Encounters:   07/17/17 60.3 kg (133 lb)   07/11/17 60.3 kg (133 lb)   04/10/17 61.2 kg (135 lb)              We Performed the Following     CBC with platelets     Prostate spec antigen screen     UA with Microscopic reflex to Culture     Urine Culture Aerobic Bacterial        Primary Care Provider Office Phone # Fax #    Fidencio Cote [phone]       [facility] " [address]  [facility] [address]        Equal Access to Services     KARYN ROBERTSON : Hadii laura ku hadelioo Soomaali, waaxda luqadaha, qaybta kaalmada rocco, julia stokesbaltazarjoo gutierrez. So [facility] [phone].    ATENCIÓN: Si habla español, tiene a sims disposición servicios gratuitos de asistencia lingüística. Llame al [phone].    We comply with applicable federal civil rights laws and [place] laws. We do not discriminate on the basis of race, color, national origin, age, disability sex, sexual orientation or gender identity.            Thank you!     Thank you for choosing [facility]  for your care. Our goal is always to provide you with excellent care. Hearing back from our patients is one way we can continue to improve our services. Please take a few minutes to complete the written survey that you may receive in the mail after your visit with us. Thank you!             Your Updated Medication List - Protect others around you: Learn how to safely use, store and throw away your medicines at www.disposemymeds.org.          This list is accurate as of: 7/17/17  2:59 PM.  Always use your most recent med list.                   Brand Name Dispense Instructions for use Diagnosis    aspirin 81 MG tablet      Take 1 tablet by mouth daily *.        BIOFLEX PO      Take 1 capful by mouth 1 capsule every other day.        blood glucose monitoring lancets     1 Box    Use to test blood sugar 2 times daily or as directed.  Ok to substitute alternative if insurance prefers.    Type 2 diabetes mellitus without complication, without long-term current use of insulin (H)       blood glucose monitoring test strip    ONE TOUCH VERIO IQ    100 strip    Use to test blood sugar 2 times daily or as directed.  Ok to substitute alternative if insurance prefers.    Type 2 diabetes mellitus without complication, without long-term current use of insulin (H)       cholecalciferol 5000 UNITS Caps  capsule    vitamin D3     Take by mouth daily        FISH OIL      1 capsule every other day        lisinopril 2.5 MG tablet    PRINIVIL/Zestril    90 tablet    Take 1 tablet (2.5 mg) by mouth daily for kidneys in diabetes.    Type 2 diabetes mellitus without complication, without long-term current use of insulin (H)       MULTIVITAMIN PO      1 tab every other day        omeprazole 20 MG CR capsule    priLOSEC    90 capsule    Take 1 capsule (20 mg) by mouth daily for reflux.    GERD (gastroesophageal reflux disease)       pravastatin 40 MG tablet    PRAVACHOL    90 tablet    Take 1 tablet (40 mg) by mouth every evening for cholesterol.    Hyperlipidemia LDL goal <100       traZODone 50 MG tablet    DESYREL    90 tablet    Take 1 tablet (50 mg) by mouth nightly as needed for sleep    Persistent disorder of initiating or maintaining sleep

## 2017-07-18 LAB
BACTERIA SPEC CULT: NO GROWTH
MICRO REPORT STATUS: NORMAL
SPECIMEN SOURCE: NORMAL

## 2017-07-20 ENCOUNTER — TELEPHONE (OUTPATIENT)
Dept: FAMILY MEDICINE | Facility: CLINIC | Age: 72
End: 2017-07-20

## 2017-07-20 NOTE — TELEPHONE ENCOUNTER
Notes Recorded by Fidencio Cote MD on 7/18/2017 at 1:08 PM  Mr. Bryant,    Your PSA was elevated.  This is often caused by some inflammation of the prostate (which may have occurred during your urinary infection) but in some cases, this can be an indication of prostate cancer.  The most appropriate approach is to finish your antibiotic and recheck your PSA again 2-4 weeks after you are finished (approximately between August 7-21).  I will order the repeat test, and you can schedule a lab appointment to do it.    Please contact the clinic if you have additional questions.  Thank you.    Sincerely,      Fidencio Cote MD    Patient notified of the message above.  Saranya Salazar RN

## 2017-07-20 NOTE — TELEPHONE ENCOUNTER
Reason for Call:  Other     Detailed comments: wants to ask about PSA results    Phone Number Patient can be reached at: Home number on file [phone] (home)    Best Time: any    Can we leave a detailed message on this number? YES yes but wants to talk with you    Call taken on 7/20/2017 at 12:35 PM by Leonora Kumar

## 2017-08-23 DIAGNOSIS — R97.20 ELEVATED PROSTATE SPECIFIC ANTIGEN (PSA): ICD-10-CM

## 2017-08-23 LAB — PSA SERPL-ACNC: 1.94 UG/L (ref 0–4)

## 2017-08-23 PROCEDURE — 84153 ASSAY OF PSA TOTAL: CPT | Performed by: FAMILY MEDICINE

## 2017-11-07 ENCOUNTER — ALLIED HEALTH/NURSE VISIT (OUTPATIENT)
Dept: NURSING | Facility: CLINIC | Age: 72
End: 2017-11-07
Payer: COMMERCIAL

## 2017-11-07 DIAGNOSIS — Z23 NEED FOR PROPHYLACTIC VACCINATION AND INOCULATION AGAINST INFLUENZA: Primary | ICD-10-CM

## 2017-11-07 PROCEDURE — G0008 ADMIN INFLUENZA VIRUS VAC: HCPCS

## 2017-11-07 PROCEDURE — 99207 ZZC NO CHARGE NURSE ONLY: CPT

## 2017-11-07 PROCEDURE — 90662 IIV NO PRSV INCREASED AG IM: CPT

## 2017-11-07 NOTE — MR AVS SNAPSHOT
After Visit Summary   11/7/2017    Stephon Bryant    MRN: [id number]           Patient Information     Date Of Birth          1945        Visit Information        Provider Department      11/7/2017 2:40 PM [facility]        Today's Diagnoses     Need for prophylactic vaccination and inoculation against influenza    -  1       Follow-ups after your visit        Who to contact     If you have questions or need follow up information about today's clinic visit or your schedule please contact [facility] directly at [phone].  Normal or non-critical lab and imaging results will be communicated to you by [employer], letter or phone within 4 business days after the clinic has received the results. If you do not hear from us within 7 days, please contact the clinic through [employer] or phone. If you have a critical or abnormal lab result, we will notify you by phone as soon as possible.  Submit refill requests through [employer] or call your pharmacy and they will forward the refill request to us. Please allow 3 business days for your refill to be completed.          Additional Information About Your Visit        MyChart Information     [employer] gives you secure access to your electronic health record. If you see a primary care provider, you can also send messages to your care team and make appointments. If you have questions, please call your primary care clinic.  If you do not have a primary care provider, please call [phone] and they will assist you.        Care EveryWhere ID     This is your Care EveryWhere ID. This could be used by other organizations to access your [facility] medical records  [id number]         Blood Pressure from Last 3 Encounters:   07/17/17 107/69   07/11/17 133/79   04/10/17 126/78    Weight from Last 3 Encounters:   07/17/17 133 lb (60.3 kg)   07/11/17 133 lb (60.3 kg)   04/10/17 135 lb (61.2 kg)              We Performed the  Following     ADMIN INFLUENZA (For MEDICARE Patients ONLY) []     FLU VACCINE, INCREASED ANTIGEN, PRESV FREE, AGE 65+ [18850]        Primary Care Provider Office Phone # Fax #    Fidencio Cote [phone]       [address]  [facility] [address]        Equal Access to Services     KARYN ROBERTSON : Hadii aad ku hadasho Soomaali, waaxda luqadaha, qaybta kaalmada adeegyada, waxay idiin hayaan adeeg kharash la'aan . So [facility] [phone].    ATENCIÓN: Si habla español, tiene a sims disposición servicios gratuitos de asistencia lingüística. Llame al [phone].    We comply with applicable federal civil rights laws and [place] laws. We do not discriminate on the basis of race, color, national origin, age, disability, sex, sexual orientation, or gender identity.            Thank you!     Thank you for choosing [facility]  for your care. Our goal is always to provide you with excellent care. Hearing back from our patients is one way we can continue to improve our services. Please take a few minutes to complete the written survey that you may receive in the mail after your visit with us. Thank you!             Your Updated Medication List - Protect others around you: Learn how to safely use, store and throw away your medicines at www.disposemymeds.org.          This list is accurate as of: 11/7/17  3:02 PM.  Always use your most recent med list.                   Brand Name Dispense Instructions for use Diagnosis    aspirin 81 MG tablet      Take 1 tablet by mouth daily *.        BIOFLEX PO      Take 1 capful by mouth 1 capsule every other day.        blood glucose monitoring lancets     1 Box    Use to test blood sugar 2 times daily or as directed.  Ok to substitute alternative if insurance prefers.    Type 2 diabetes mellitus without complication, without long-term current use of insulin (H)       blood glucose monitoring test strip    ONETOUCH VERIO IQ    100 strip     Use to test blood sugar 2 times daily or as directed.  Ok to substitute alternative if insurance prefers.    Type 2 diabetes mellitus without complication, without long-term current use of insulin (H)       cholecalciferol 5000 UNITS Caps capsule    vitamin D3     Take by mouth daily        FISH OIL      1 capsule every other day        lisinopril 2.5 MG tablet    PRINIVIL/Zestril    90 tablet    Take 1 tablet (2.5 mg) by mouth daily for kidneys in diabetes.    Type 2 diabetes mellitus without complication, without long-term current use of insulin (H)       MULTIVITAMIN PO      1 tab every other day        omeprazole 20 MG CR capsule    priLOSEC    90 capsule    Take 1 capsule (20 mg) by mouth daily for reflux.    GERD (gastroesophageal reflux disease)       pravastatin 40 MG tablet    PRAVACHOL    90 tablet    Take 1 tablet (40 mg) by mouth every evening for cholesterol.    Hyperlipidemia LDL goal <100       traZODone 50 MG tablet    DESYREL    90 tablet    Take 1 tablet (50 mg) by mouth nightly as needed for sleep    Persistent disorder of initiating or maintaining sleep

## 2017-11-07 NOTE — PROGRESS NOTES

## 2017-11-28 ENCOUNTER — OFFICE VISIT (OUTPATIENT)
Dept: FAMILY MEDICINE | Facility: CLINIC | Age: 72
End: 2017-11-28
Payer: COMMERCIAL

## 2017-11-28 VITALS
OXYGEN SATURATION: 98 % | BODY MASS INDEX: 21.82 KG/M2 | TEMPERATURE: 97.3 F | WEIGHT: 135.2 LBS | SYSTOLIC BLOOD PRESSURE: 135 MMHG | HEART RATE: 65 BPM | DIASTOLIC BLOOD PRESSURE: 75 MMHG

## 2017-11-28 DIAGNOSIS — K21.9 GASTROESOPHAGEAL REFLUX DISEASE, ESOPHAGITIS PRESENCE NOT SPECIFIED: ICD-10-CM

## 2017-11-28 DIAGNOSIS — J00 ACUTE NASOPHARYNGITIS: Primary | ICD-10-CM

## 2017-11-28 LAB
DEPRECATED S PYO AG THROAT QL EIA: NORMAL
SPECIMEN SOURCE: NORMAL

## 2017-11-28 PROCEDURE — 87880 STREP A ASSAY W/OPTIC: CPT | Performed by: PHYSICIAN ASSISTANT

## 2017-11-28 PROCEDURE — 99213 OFFICE O/P EST LOW 20 MIN: CPT | Performed by: PHYSICIAN ASSISTANT

## 2017-11-28 PROCEDURE — 87081 CULTURE SCREEN ONLY: CPT | Performed by: PHYSICIAN ASSISTANT

## 2017-11-28 RX ORDER — PSEUDOEPHEDRINE HCL 120 MG/1
120 TABLET, FILM COATED, EXTENDED RELEASE ORAL EVERY 12 HOURS
Qty: 28 TABLET | Refills: 0 | Status: SHIPPED | OUTPATIENT
Start: 2017-11-28 | End: 2017-12-26

## 2017-11-28 RX ORDER — GUAIFENESIN AND DEXTROMETHORPHAN HYDROBROMIDE 1200; 60 MG/1; MG/1
1 TABLET, EXTENDED RELEASE ORAL 2 TIMES DAILY
Qty: 28 TABLET | Refills: 0 | Status: SHIPPED | OUTPATIENT
Start: 2017-11-28 | End: 2017-12-26

## 2017-11-28 NOTE — MR AVS SNAPSHOT
After Visit Summary   11/28/2017    Stephon Bryant    MRN: [id number]           Patient Information     Date Of Birth          1945        Visit Information        Provider Department      11/28/2017 9:40 AM Joyce Peña PA-C [facility]        Today's Diagnoses     Acute nasopharyngitis    -  1    Gastroesophageal reflux disease, esophagitis presence not specified          Care Instructions    Mucinex DM 1 tablet twice a day  Sudafed 1 tablet twice a day  Increase water intake  Gurgle with salt water  Cepacol lozenges for sore throat.   Adult Self-Care for Colds  Colds are caused by viruses. They can't be cured with antibiotics. However, you can ease symptoms and support your body's efforts to heal itself.  No matter which symptoms you have, be sure to:    Drink plenty of fluids (water or clear soup)    Stop smoking and drinking alcohol    Get plenty of rest    Understand a fever    Take your temperature several times a day. If your fever is 100.4 F (38.0 C) for more than a day, call your healthcare provider.    Relax, lie down. Go to bed if you want. Just get off your feet and rest. Also, drink plenty of fluids to avoid dehydration.    Take acetaminophen or a nonsteroidal anti-inflammatory agent (NSAID), such as ibuprofen.  Treat a troubled nose kindly    Breathe steam or heated humidified air to open blocked nasal passages.  a hot shower or use a vaporizer. Be careful not to get burned by the steam.    Saline nasal sprays and decongestant tablets help open a stuffy nose. Antihistamines can also help, but they can cause side effects such as drowsiness and drying of the eyes, nose, and mouth.  Soothe a sore throat and cough    Gargle every 2 hours with 1/4 teaspoon of salt dissolved in 1/2 cup of warm water. Suck on throat lozenges and cough drops to moisten your throat.    Cough medicines are available but it is unclear how well they actually  work.    Take acetaminophen or an NSAID, such as ibuprofen, to ease throat pain  Ease digestive problems    Put fluids back into your body. Take frequent sips of clear liquids such as water or broth. Avoid drinks that have a lot of sugar in them, such as juices and sodas. These can make diarrhea worse. Older children and adults can drink sports drinks.    As your appetite returns, you can resume your normal diet. Ask your healthcare provider if there are any foods you should avoid.  When to seek medical care  When you first notice symptoms, ask your healthcare provider if antiviral medicines are appropriate. Antibiotics should not be taken for colds or flu. Also, call your healthcare provider if you have any of the following symptoms or if you aren't feeling better after 7 days:    Shortness of breath    Pain or pressure in the chest or belly (abdomen)    Worsening symptoms, especially after a period of improvement    Fever of 100.4 F  (38.0 C) or higher, or fever that doesn't go down with medicine    Sudden dizziness or confusion    Severe or continued vomiting    Signs of dehydration, including extreme thirst, dark urine, infrequent urination, dry mouth    Spotted, red, or very sore throat   Date Last Reviewed: 12/1/2016 2000-2017 [employer]. [address] [place] [address]. All rights reserved. This information is not intended as a substitute for professional medical care. Always follow your healthcare professional's instructions.                Follow-ups after your visit        Who to contact     If you have questions or need follow up information about today's clinic visit or your schedule please contact [facility] directly at [phone].  Normal or non-critical lab and imaging results will be communicated to you by MyChart, letter or phone within 4 business days after the clinic has received the results. If you do not hear from us within 7 days, please contact  the clinic through [employer] or phone. If you have a critical or abnormal lab result, we will notify you by phone as soon as possible.  Submit refill requests through [employer] or call your pharmacy and they will forward the refill request to us. Please allow 3 business days for your refill to be completed.          Additional Information About Your Visit        [employer] Information     [employer] gives you secure access to your electronic health record. If you see a primary care provider, you can also send messages to your care team and make appointments. If you have questions, please call your primary care clinic.  If you do not have a primary care provider, please call [phone] and they will assist you.        Care EveryWhere ID     This is your Care EveryWhere ID. This could be used by other organizations to access your [facility] medical records  [id number]        Your Vitals Were     Pulse Temperature Pulse Oximetry BMI (Body Mass Index)          65 97.3  F (36.3  C) (Oral) 98% 21.82 kg/m2         Blood Pressure from Last 3 Encounters:   11/28/17 135/75   07/17/17 107/69   07/11/17 133/79    Weight from Last 3 Encounters:   11/28/17 135 lb 3.2 oz (61.3 kg)   07/17/17 133 lb (60.3 kg)   07/11/17 133 lb (60.3 kg)              We Performed the Following     Beta strep group A culture     Strep, Rapid Screen          Today's Medication Changes          These changes are accurate as of: 11/28/17 10:44 AM.  If you have any questions, ask your nurse or doctor.               Start taking these medicines.        Dose/Directions    Dextromethorphan-Guaifenesin  MG Tb12   Used for:  Acute nasopharyngitis   Started by:  Joyce Peña PA-C        Dose:  1 tablet   Take 1 tablet by mouth 2 times daily   Quantity:  28 tablet   Refills:  0       pseudoePHEDrine 120 MG 12 hr tablet   Commonly known as:  SUDAFED   Used for:  Acute nasopharyngitis   Started by:  Joyce Peña PA-C         Dose:  120 mg   Take 1 tablet (120 mg) by mouth every 12 hours   Quantity:  28 tablet   Refills:  0            Where to get your medicines      These medications were sent to [id number] - [place], [place] - [address]  [address] [place] [address]     Phone:  [phone]     Dextromethorphan-Guaifenesin  MG Tb12    omeprazole 20 MG CR capsule         Some of these will need a paper prescription and others can be bought over the counter.  Ask your nurse if you have questions.     Bring a paper prescription for each of these medications     pseudoePHEDrine 120 MG 12 hr tablet                Primary Care Provider Office Phone # Fax #    Fidencio Cote [phone]       [address]  [place] [facility] [address]        Equal Access to Services     [facility] : Hadii laura argueta hadasho Soomaali, waaxda luqadaha, qaybta kaalmada adeegyada, waxvanna lucia . [facility] [phone].    ATENCIÓN: Si habla español, tiene a sims disposición servicios gratuitos de asistencia lingüística. [facility] [phone].    We comply with applicable federal civil rights laws and [place] laws. We do not discriminate on the basis of race, color, national origin, age, disability, sex, sexual orientation, or gender identity.            Thank you!     Thank you for choosing [facility]  for your care. Our goal is always to provide you with excellent care. Hearing back from our patients is one way we can continue to improve our services. Please take a few minutes to complete the written survey that you may receive in the mail after your visit with us. Thank you!             Your Updated Medication List - Protect others around you: Learn how to safely use, store and throw away your medicines at www.disposemymeds.org.          This list is accurate as of: 11/28/17 10:44 AM.  Always use your most recent med list.                   Brand Name Dispense  Instructions for use Diagnosis    aspirin 81 MG tablet      Take 1 tablet by mouth daily *.        BIOFLEX PO      Take 1 capful by mouth 1 capsule every other day.        blood glucose monitoring lancets     1 Box    Use to test blood sugar 2 times daily or as directed.  Ok to substitute alternative if insurance prefers.    Type 2 diabetes mellitus without complication, without long-term current use of insulin (H)       blood glucose monitoring test strip    ONETOUCH VERIO IQ    100 strip    Use to test blood sugar 2 times daily or as directed.  Ok to substitute alternative if insurance prefers.    Type 2 diabetes mellitus without complication, without long-term current use of insulin (H)       cholecalciferol 5000 UNITS Caps capsule    vitamin D3     Take by mouth daily        Dextromethorphan-Guaifenesin  MG Tb12     28 tablet    Take 1 tablet by mouth 2 times daily    Acute nasopharyngitis       FISH OIL      1 capsule every other day        lisinopril 2.5 MG tablet    PRINIVIL/Zestril    90 tablet    Take 1 tablet (2.5 mg) by mouth daily for kidneys in diabetes.    Type 2 diabetes mellitus without complication, without long-term current use of insulin (H)       MULTIVITAMIN PO      1 tab every other day        omeprazole 20 MG CR capsule    priLOSEC    90 capsule    Take 1 capsule (20 mg) by mouth daily for reflux.    Gastroesophageal reflux disease, esophagitis presence not specified       pravastatin 40 MG tablet    PRAVACHOL    90 tablet    Take 1 tablet (40 mg) by mouth every evening for cholesterol.    Hyperlipidemia LDL goal <100       pseudoePHEDrine 120 MG 12 hr tablet    SUDAFED    28 tablet    Take 1 tablet (120 mg) by mouth every 12 hours    Acute nasopharyngitis       traZODone 50 MG tablet    DESYREL    90 tablet    Take 1 tablet (50 mg) by mouth nightly as needed for sleep    Persistent disorder of initiating or maintaining sleep

## 2017-11-28 NOTE — PATIENT INSTRUCTIONS
Mucinex DM 1 tablet twice a day  Sudafed 1 tablet twice a day  Increase water intake  Gurgle with salt water  Cepacol lozenges for sore throat.   Adult Self-Care for Colds  Colds are caused by viruses. They can't be cured with antibiotics. However, you can ease symptoms and support your body's efforts to heal itself.  No matter which symptoms you have, be sure to:    Drink plenty of fluids (water or clear soup)    Stop smoking and drinking alcohol    Get plenty of rest    Understand a fever    Take your temperature several times a day. If your fever is 100.4 F (38.0 C) for more than a day, call your healthcare provider.    Relax, lie down. Go to bed if you want. Just get off your feet and rest. Also, drink plenty of fluids to avoid dehydration.    Take acetaminophen or a nonsteroidal anti-inflammatory agent (NSAID), such as ibuprofen.  Treat a troubled nose kindly    Breathe steam or heated humidified air to open blocked nasal passages.  a hot shower or use a vaporizer. Be careful not to get burned by the steam.    Saline nasal sprays and decongestant tablets help open a stuffy nose. Antihistamines can also help, but they can cause side effects such as drowsiness and drying of the eyes, nose, and mouth.  Soothe a sore throat and cough    Gargle every 2 hours with 1/4 teaspoon of salt dissolved in 1/2 cup of warm water. Suck on throat lozenges and cough drops to moisten your throat.    Cough medicines are available but it is unclear how well they actually work.    Take acetaminophen or an NSAID, such as ibuprofen, to ease throat pain  Ease digestive problems    Put fluids back into your body. Take frequent sips of clear liquids such as water or broth. Avoid drinks that have a lot of sugar in them, such as juices and sodas. These can make diarrhea worse. Older children and adults can drink sports drinks.    As your appetite returns, you can resume your normal diet. Ask your healthcare provider if there are any  foods you should avoid.  When to seek medical care  When you first notice symptoms, ask your healthcare provider if antiviral medicines are appropriate. Antibiotics should not be taken for colds or flu. Also, call your healthcare provider if you have any of the following symptoms or if you aren't feeling better after 7 days:    Shortness of breath    Pain or pressure in the chest or belly (abdomen)    Worsening symptoms, especially after a period of improvement    Fever of 100.4 F  (38.0 C) or higher, or fever that doesn't go down with medicine    Sudden dizziness or confusion    Severe or continued vomiting    Signs of dehydration, including extreme thirst, dark urine, infrequent urination, dry mouth    Spotted, red, or very sore throat   Date Last Reviewed: 12/1/2016 2000-2017 [employer]. [address] [place] [address], [place] [address]. All rights reserved. This information is not intended as a substitute for professional medical care. Always follow your healthcare professional's instructions.

## 2017-11-28 NOTE — PROGRESS NOTES
SUBJECTIVE:   Stephon Bryant is a 72 year old male who presents to clinic today for the following health issues:    Acute Illness   Acute illness concerns: Cough with chest pressure/pain when coughing   Onset: x3-4 days     Fever: no     Chills/Sweats: no     Headache (location?): no     Sinus Pressure:no    Conjunctivitis:  no    Ear Pain: no    Rhinorrhea: YES    Congestion: no     Sore Throat: YES     Cough: YES     Wheeze: no     Decreased Appetite: no    Nausea: no    Vomiting: no    Diarrhea:  no    Dysuria/Freq.: no    Fatigue/Achiness: YES- fatigue     Sick/Strep Exposure: no     Therapies Tried and outcome: OTC cough medication       Problem list and histories reviewed & adjusted, as indicated.  Additional history: as documented    Patient Active Problem List   Diagnosis     Hyperlipidemia LDL goal <100     History of basal cell carcinoma     Erectile dysfunction     Advance care planning     Gastroesophageal reflux disease without esophagitis     Persistent disorder of initiating or maintaining sleep     Type 2 diabetes mellitus without complication, without long-term current use of insulin (H)     Past Surgical History:   Procedure Laterality Date     COLONOSCOPY       COLONOSCOPY  12/18/2013    Procedure: COMBINED COLONOSCOPY, SINGLE BIOPSY/POLYPECTOMY BY BIOPSY;  Cecal polyp-X 1, removed with cold biopsy forceps.;  Surgeon: Duane, William Charles, MD;  Location: [facility] OR     ESOPHAGOSCOPY, GASTROSCOPY, DUODENOSCOPY (EGD), COMBINED  12/18/2013    Procedure: COMBINED ESOPHAGOSCOPY, GASTROSCOPY, DUODENOSCOPY (EGD), BIOPSY SINGLE OR MULTIPLE;  Duodenum BX were taken with cold biopsy forceps to evaluate for spru.;  Surgeon: Duane, William Charles, MD;  Location:  OR     PROSTATE BIOPSY, NEEDLE, SATURATION SAMPLING  pre-2005    [facility] for elevated PSA       Social History   Substance Use Topics     Smoking status: Former Smoker     Packs/day: 0.30     Years: 7.00     Types: Cigarettes     Smokeless  tobacco: Never Used      Comment: [id number] (in service)     Alcohol use No     Family History   Problem Relation Age of Onset     HEART DISEASE Father      DIABETES Sister      CANCER Brother 64     lymphoma?     Breast Cancer Sister      Prostate Cancer No family hx of      unk     Hypertension No family hx of      CEREBROVASCULAR DISEASE No family hx of          Current Outpatient Prescriptions   Medication Sig Dispense Refill     omeprazole (PRILOSEC) 20 MG CR capsule Take 1 capsule (20 mg) by mouth daily for reflux. 90 capsule 1     Dextromethorphan-Guaifenesin  MG TB12 Take 1 tablet by mouth 2 times daily 28 tablet 0     pseudoePHEDrine (SUDAFED) 120 MG 12 hr tablet Take 1 tablet (120 mg) by mouth every 12 hours 28 tablet 0     pravastatin (PRAVACHOL) 40 MG tablet Take 1 tablet (40 mg) by mouth every evening for cholesterol. 90 tablet 1     traZODone (DESYREL) 50 MG tablet Take 1 tablet (50 mg) by mouth nightly as needed for sleep 90 tablet 1     blood glucose monitoring (ONE TOUCH VERIO IQ) test strip Use to test blood sugar 2 times daily or as directed.  Ok to substitute alternative if insurance prefers. 100 strip 3     blood glucose monitoring (ONE TOUCH DELICA) lancets Use to test blood sugar 2 times daily or as directed.  Ok to substitute alternative if insurance prefers. 1 Box prn     cholecalciferol (VITAMIN D3) 5000 UNITS CAPS capsule Take by mouth daily       aspirin 81 MG tablet Take 1 tablet by mouth daily *.        FISH OIL 1 capsule every other day       Multiple Vitamins-Minerals (MULTIVITAMIN OR) 1 tab every other day       lisinopril (PRINIVIL/ZESTRIL) 2.5 MG tablet Take 1 tablet (2.5 mg) by mouth daily for kidneys in diabetes. (Patient not taking: Reported on 11/28/2017) 90 tablet 3     Bioflavonoid Products (BIOFLEX PO) Take 1 capful by mouth 1 capsule every other day.       Allergies   Allergen Reactions     No Clinical Screening - See Comments      No latex allergy     Penicillins            ROS:  Constitutional, HEENT, cardiovascular, pulmonary, GI, , musculoskeletal, neuro, skin, endocrine and psych systems are negative, except as otherwise noted.      OBJECTIVE:   /75 (BP Location: Left arm, Patient Position: Sitting, Cuff Size: Adult Regular)  Pulse 65  Temp 97.3  F (36.3  C) (Oral)  Wt 135 lb 3.2 oz (61.3 kg)  SpO2 98%  BMI 21.82 kg/m2  Body mass index is 21.82 kg/(m^2).  GENERAL: healthy, alert and no distress  EYES: Eyes grossly normal to inspection, PERRL and conjunctivae and sclerae normal  HENT: normal cephalic/atraumatic, ear canals and TM's normal, nose and mouth without ulcers or lesions, oropharynx clear, oral mucous membranes moist and mild postnasal drip is present  NECK: no adenopathy, no asymmetry, masses, or scars and thyroid normal to palpation  RESP: lungs clear to auscultation - no rales, rhonchi or wheezes  CV: regular rate and rhythm, normal S1 S2, no S3 or S4, no murmur, click or rub, no peripheral edema and peripheral pulses strong  ABDOMEN: soft, nontender, no hepatosplenomegaly, no masses and bowel sounds normal  MS: no gross musculoskeletal defects noted, no edema    Diagnostic Test Results:  Results for orders placed or performed in visit on 11/28/17 (from the past 24 hour(s))   Strep, Rapid Screen   Result Value Ref Range    Specimen Description Throat     Rapid Strep A Screen       NEGATIVE: No Group A streptococcal antigen detected by immunoassay, await culture report.       ASSESSMENT/PLAN:       ICD-10-CM    1. Acute nasopharyngitis J00 Strep, Rapid Screen     Beta strep group A culture     Dextromethorphan-Guaifenesin  MG TB12     pseudoePHEDrine (SUDAFED) 120 MG 12 hr tablet   2. Gastroesophageal reflux disease, esophagitis presence not specified K21.9 omeprazole (PRILOSEC) 20 MG CR capsule     Mucinex DM 1 tablet twice a day  Sudafed 1 tablet twice a day  Increase water intake  Gurgle with salt water  Cepacol lozenges for sore throat.        Joyce Peña PA-C  [facility]

## 2017-11-29 LAB
BACTERIA SPEC CULT: NORMAL
SPECIMEN SOURCE: NORMAL

## 2017-12-26 ENCOUNTER — OFFICE VISIT (OUTPATIENT)
Dept: FAMILY MEDICINE | Facility: CLINIC | Age: 72
End: 2017-12-26
Payer: COMMERCIAL

## 2017-12-26 VITALS
HEART RATE: 68 BPM | SYSTOLIC BLOOD PRESSURE: 131 MMHG | WEIGHT: 134 LBS | OXYGEN SATURATION: 100 % | TEMPERATURE: 96.4 F | DIASTOLIC BLOOD PRESSURE: 70 MMHG | BODY MASS INDEX: 21.53 KG/M2 | HEIGHT: 66 IN

## 2017-12-26 DIAGNOSIS — J01.90 ACUTE SINUSITIS WITH SYMPTOMS > 10 DAYS: Primary | ICD-10-CM

## 2017-12-26 DIAGNOSIS — J20.9 ACUTE BRONCHITIS, UNSPECIFIED ORGANISM: ICD-10-CM

## 2017-12-26 PROCEDURE — 99214 OFFICE O/P EST MOD 30 MIN: CPT | Performed by: FAMILY MEDICINE

## 2017-12-26 RX ORDER — DOXYCYCLINE 100 MG/1
100 CAPSULE ORAL 2 TIMES DAILY
Qty: 20 CAPSULE | Refills: 0 | Status: SHIPPED | OUTPATIENT
Start: 2017-12-26 | End: 2018-01-05

## 2017-12-26 RX ORDER — PREDNISONE 20 MG/1
20 TABLET ORAL 2 TIMES DAILY WITH MEALS
Qty: 12 TABLET | Refills: 0 | Status: SHIPPED | OUTPATIENT
Start: 2017-12-26 | End: 2018-01-01

## 2017-12-26 ASSESSMENT — PAIN SCALES - GENERAL: PAINLEVEL: NO PAIN (0)

## 2017-12-26 NOTE — PROGRESS NOTES
"  SUBJECTIVE:   Stephon Bryant is a 72 year old male who presents to clinic today for the following health issues:    Acute Illness Follow up   Acute illness concerns: Chest problem causing dry cough  Onset: 1 month    Fever: no     Chills/Sweats: no     Headache (location?): no     Sinus Pressure:no, but notes a \"numbness\" around his mouth    Conjunctivitis:  no    Ear Pain: YES: right intermittent achy, worsens when lays down, causes dizziness    Rhinorrhea: no     Congestion: no     Sore Throat: no      Cough: YES-non-productive from chest feeling dry and also causing SOB    Wheeze: no     Decreased Appetite: no     Nausea: no     Vomiting: no     Diarrhea:  no     Dysuria/Freq.: no     Fatigue/Achiness: no     Sick/Strep Exposure: no      Therapies Tried and outcome: Prescriptions given at the last visit completed - seen on 11/28    - He mentions his sons have had TB in the past; he does not see his sons often because they live far away, but he did have contact with them while they were being treated for TB.     Medications updated and reviewed.  Past, family and surgical history is updated and reviewed in the record.    ROS:    MS: Notes some difficulty when holding objects - he notices his hands shake and wonders if this is due to age or something else.   Other than noted above, general, HEENT, respiratory, cardiac and gastrointestinal systems are negative.    This document serves as a record of the services and decisions personally performed and made by Dr. Cote. It was created on his behalf by Carmella Olvera, a trained medical scribe. The creation of this document is based the provider's statements to the medical scribe.  Carmella Olvera December 26, 2017 12:10 PM     OBJECTIVE:                                                    /70 (BP Location: Right arm, Patient Position: Chair, Cuff Size: Adult Regular)  Pulse 68  Temp 96.4  F (35.8  C) (Oral)  Ht 1.676 m (5' 6\")  Wt 60.8 kg (134 lb)  SpO2 " 100%  BMI 21.63 kg/m2   Body mass index is 21.63 kg/(m^2).     GENERAL APPEARANCE ADULT: Alert, no acute distress  EYES: PERRL, EOM normal, conjunctiva and lids normal  HENT: right TM normal, left TM chronic perforation, nose no nasal discharge or congestion, frontal sinus tenderness no, maxillary sinus tenderness no, throat/mouth:normal, mucous membranes moist  RESP: lungs clear to auscultation   CV: normal rate, regular rhythm, no murmur or gallop  MS: extremities normal, no peripheral edema  SKIN: no suspicious lesions or rashes  NEURO: Alert, oriented, speech and mentation normal, Cranial nerves 2-12 are normal.  PSYCH: mentation appears normal., affect and mood normal    Diagnostic Test Results:  none      ASSESSMENT/PLAN:                                                      (J01.90) Acute sinusitis with symptoms > 10 days  (primary encounter diagnosis)  Comment:   Plan: doxycycline monohydrate 100 MG capsule        Return in about 2 weeks (around 1/11/2018) for recheck if symptoms fail to resolve by then (at his diabetes check).     (J20.9) Acute bronchitis, unspecified organism  Comment:   Plan: predniSONE (DELTASONE) 20 MG tablet        Follow up on 1/11/18 as needed.      The information in this document, created by the medical scribe for me, accurately reflects the services I personally performed and the decisions made by me. I have reviewed and approved this document for accuracy prior to leaving the patient care area. December 26, 2017 12:10 PM   Fidencio Cote MD

## 2017-12-26 NOTE — MR AVS SNAPSHOT
After Visit Summary   12/26/2017    Stephon Bryant    MRN: [id number]           Patient Information     Date Of Birth          1945        Visit Information        Provider Department      12/26/2017 11:40 AM Fidencio Cote MD [facility] [address]        Today's Diagnoses     Acute sinusitis with symptoms > 10 days    -  1    Acute bronchitis, unspecified organism          Care Instructions    At [facility], we strive to deliver an exceptional experience to you, every time we see you.  If you receive a survey in the mail, please send us back your thoughts. We really do value your feedback.    Based on your medical history, these are the current health maintenance/preventive care services that you are due for (some may have been done at this visit.)  Health Maintenance Due   Topic Date Due     MICROALBUMIN Q1 YEAR  12/08/2017     DIABETIC EDUCATION Q1 YEAR  12/15/2017     FOOT EXAM Q1 YEAR  01/09/2018     A1C Q6 MO  01/11/2018         Suggested websites for health information:  Www.[employer] : Up to date and easily searchable information on multiple topics.  Www.medlineplus.gov : medication info, interactive tutorials, watch real surgeries online  Www.familydoctor.org : good info from the Academy of Family Physicians  Www.cdc.gov : public health info, travel advisories, epidemics (H1N1)  Www.aap.org : children's health info, normal development, vaccinations  Www.health.state[place] : [place] dept of health, public health issues in [place], N1N1    Your care team:                            Family Medicine Internal Medicine   MD Tripp Wilde MD Shantel Branch-Fleming, MD Katya Georgiev PA-C Nam Ho, MD Pediatrics   LIZZETH Garzon, MD Desire Haynes CNP, MD Deborah Mielke, MD Kim Thein, APRN CNP      Clinic hours: Monday - Thursday 7 am-7 pm; Fridays 7 am-5 pm.   Urgent care: Monday -  Friday 11 am-9 pm; Saturday and Sunday 9 am-5 pm.  Pharmacy : Monday -Thursday 8 am-8 pm; Friday 8 am-6 pm; Saturday and Sunday 9 am-5 pm.     Clinic: [phone]   Pharmacy: [phone]    Essential Tremor (ET)  What is essential tremor?  Essential tremor (ET) is a neurological disorder that causes your hands, head, trunk, voice, or legs to shake rhythmically. It is often confused with Parkinson disease.  ET is the most common trembling disorder that people have. Everyone has some ET, but the movements usually cannot be seen or felt. When tremors are noticeable, the condition is classified as ET.  ET is most common among people older than age 65, but it can affect people at any age.  What causes ET?  ET can occur in different people for different reasons:    Familial essential tremor. In most people, the condition seems to be passed down from a parent to a child. If your parent has ET, there is a 50% chance that you or your children will inherit the gene responsible for the condition.    Essential tremor related to another disorder. Sometimes, a tremor is a symptom of another neurological disorder, such as Parkinson disease or dystonia. Sometimes, ET is mistaken for these other diseases when they are not present. A healthcare provider s careful diagnosis is extremely important.  The cause of ET isn t known. However, one theory suggests that your cerebellum and other parts of your brain are not communicating correctly. The cerebellum is a part of the brain that controls muscle coordination.  What are the symptoms of ET?  If you have ET, you will have shaking and trembling at different times and in different situations, but some characteristics are common to all. Here is what you might typically experience:    Tremors occur when you move and are less noticeable when you rest.    Certain medicines, caffeine, or stress can make your tremors worse.    Tremor may improve with ingestion of a small amount of alcohol  (such as wine)    Tremors get worse as you age.    Tremors don t affect both sides of your body in the same way.  Here are different signs of essential tremor:    Tremors that are most obvious in your hands    Difficulty doing tasks with your hands, such as writing or using tools    Shaking or quivering sound in your voice    Uncontrollable head-nodding    In rare instances, tremors in your legs or feet  How is ET diagnosed?  Your rapid, uncontrollable trembling, as well as questions about your medical and family history, can help your healthcare provider determine if you have familial ET. He or she will probably need to rule out other conditions that could cause shaking or trembling. For example, tremors could be symptoms of diseases, such as hyperthyroidism. Your healthcare provider might test you for those, as well.  In some cases, the tremors might be related to other factors. To find out for certain, your healthcare provider may have you try to:    Abstain from heavy alcohol use; if you re an alcoholic, trembling is a common symptom.    Cut out cigarette smoking.    Avoid caffeine.    Avoid certain medicines  How is ET treated?   Propanolol and primidone are 2 medicines often prescribed to treat ET. Propanolol blocks the stimulating action of neurotransmitters to calm your trembling. Primidone is a common antiseizure medicine that also controls the actions of neurotransmitters.  Gabapentin and topiramate are 2 other antiseizure medicines that are sometimes prescribed. In some cases, tranquilizers like alprazolam or clonazepam might be suggested.  For ET in your hands, botulinum toxin (Botox) injections have shown some promise in easing the trembling. They work by weakening the surrounding muscles around your hands. For severe tremors, a stimulating device (deep brain stimulator) surgically implanted in your brain may help.  Can ET be prevented?   The specific cause of ET is not known, so scientists are not sure  how the condition can be prevented.  Living with ET  ET is usually not dangerous, but it can certainly be frustrating if you have to deal with it. Certain factors can make tremors worse, so the following steps may help to decrease tremors:    Avoid alcohol, cigarettes, and caffeine    Avoid stressful situations as much as possible    Use relaxation techniques, such as yoga, deep-breathing exercises, or biofeedback    Check with your healthcare provider to see if any medicines you re taking could be making your tremors worse.  Talk with your healthcare provider about other options, such as surgery, if ET starts to affect your quality of life.  When should I call my healthcare provider?  If you have been diagnosed with ET, talk with your healthcare provider about when you might need to call. He or she will likely advise you to call if your tremors become worse, or if you develop new neurologic symptoms, such as numbness or weakness.  Key points about essential tremors    ET is a neurological disorder that causes your hands, head, trunk, voice, or legs to shake rhythmically. The cause is not known, but it is often passed down from a parent to a child.    ET is sometimes confused with other types of tremor, so getting the right diagnosis is important.    Tremors tend to be worse during movement than when at rest. The tremors are usually not dangerous, but they can get worse over time.    Avoiding things that might make tremors worse, such as stress, caffeine, and certain medicines, may be helpful.    Medicines can also help control or limit tremors in some people. Severe tremors can sometimes be treated with surgery.  Next steps  Tips to help you get the most from a visit to your healthcare provider:    Know the reason for your visit and what you want to happen.    Before your visit, write down questions you want answered.    Bring someone with you to help you ask questions and remember what your provider tells  you.    At the visit, write down the name of a new diagnosis, and any new medicines, treatments, or tests. Also write down any new instructions your provider gives you.    Know why a new medicine or treatment is prescribed, and how it will help you. Also know what the side effects are.    Ask if your condition can be treated in other ways.    Know why a test or procedure is recommended and what the results could mean.    Know what to expect if you do not take the medicine or have the test or procedure.    If you have a follow-up appointment, write down the date, time, and purpose for that visit.    Know how you can contact your provider if you have questions.    9641-5613 [employer]. [address] [place] [address], [place] [address]. All rights reserved. This information is not intended as a substitute for professional medical care. Always follow your healthcare professional's instructions.                Follow-ups after your visit        Follow-up notes from your care team     Return in about 2 weeks (around 1/11/2018) for diabetes, and recheck cough if symptoms fail to resolve by then.      Who to contact     If you have questions or need follow up information about today's clinic visit or your schedule please contact [facility] directly at [phone].  Normal or non-critical lab and imaging results will be communicated to you by [facility], letter or phone within 4 business days after the clinic has received the results. If you do not hear from us within 7 days, please contact the clinic through [facility] [facility] or phone. If you have a critical or abnormal lab result, we will notify you by phone as soon as possible.  Submit refill requests through [employer] or call your pharmacy and they will forward the refill request to us. Please allow 3 business days for your refill to be completed.          Additional Information About Your Visit        [employer] Information     [employer] gives you secure access  "to your electronic health record. If you see a primary care provider, you can also send messages to your care team and make appointments. If you have questions, please call your primary care clinic.  If you do not have a primary care provider, please call [phone] and they will assist you.        Care EveryWhere ID     This is your Care EveryWhere ID. This could be used by other organizations to access your [facility] medical records  [id number]        Your Vitals Were     Pulse Temperature Height Pulse Oximetry BMI (Body Mass Index)       68 96.4  F (35.8  C) (Oral) 5' 6\" (1.676 m) 100% 21.63 kg/m2        Blood Pressure from Last 3 Encounters:   12/26/17 131/70   11/28/17 135/75   07/17/17 107/69    Weight from Last 3 Encounters:   12/26/17 134 lb (60.8 kg)   11/28/17 135 lb 3.2 oz (61.3 kg)   07/17/17 133 lb (60.3 kg)              Today, you had the following     No orders found for display         Today's Medication Changes          These changes are accurate as of: 12/26/17 12:19 PM.  If you have any questions, ask your nurse or doctor.               Start taking these medicines.        Dose/Directions    doxycycline monohydrate 100 MG capsule   Used for:  Acute sinusitis with symptoms > 10 days   Started by:  Fidencio Cote MD        Dose:  100 mg   Take 1 capsule (100 mg) by mouth 2 times daily for 10 days for sinus infection.   Quantity:  20 capsule   Refills:  0       predniSONE 20 MG tablet   Commonly known as:  DELTASONE   Used for:  Acute bronchitis, unspecified organism   Started by:  Fidencio Cote MD        Dose:  20 mg   Take 1 tablet (20 mg) by mouth 2 times daily (with meals) for 6 days   Quantity:  12 tablet   Refills:  0            Where to get your medicines      These medications were sent to [id number] - [place], [place] - [address]  [address] [facility] [address]     Phone:  [phone]     doxycycline monohydrate 100 MG capsule         Call your " pharmacy to confirm that your medication is ready for pickup. It may take up to 24 hours for them to receive the prescription. If the prescription is not ready within 3 business days, please contact your clinic or your provider.     We will let you know when these medications are ready. If you don't hear back within 3 business days, please contact us.     predniSONE 20 MG tablet                Primary Care Provider Office Phone # Fax #    Fidencio Cote [phone]       [id number] [place]  [facility] [address]        Equal Access to Services     [facility]: Hadii aad ku hadasho Soomaali, waaxda luqadaha, qaybta kaalmada adeegyada, waxay idiin hayaan adeeg kharash rea . So [facility] [phone].    ATENCIÓN: Si habla español, tiene a sims disposición servicios gratuitos de asistencia lingüística. [facility] [phone].    We comply with applicable federal civil rights laws and [place] laws. We do not discriminate on the basis of race, color, national origin, age, disability, sex, sexual orientation, or gender identity.            Thank you!     Thank you for choosing [facility]  for your care. Our goal is always to provide you with excellent care. Hearing back from our patients is one way we can continue to improve our services. Please take a few minutes to complete the written survey that you may receive in the mail after your visit with us. Thank you!             Your Updated Medication List - Protect others around you: Learn how to safely use, store and throw away your medicines at www.disposemymeds.org.          This list is accurate as of: 12/26/17 12:19 PM.  Always use your most recent med list.                   Brand Name Dispense Instructions for use Diagnosis    aspirin 81 MG tablet      Take 1 tablet by mouth daily *.        BIOFLEX PO      Take 1 capful by mouth 1 capsule every other day.        blood glucose monitoring lancets     1 Box    Use to test blood  sugar 2 times daily or as directed.  Ok to substitute alternative if insurance prefers.    Type 2 diabetes mellitus without complication, without long-term current use of insulin (H)       blood glucose monitoring test strip    ONETOUCH VERIO IQ    100 strip    Use to test blood sugar 2 times daily or as directed.  Ok to substitute alternative if insurance prefers.    Type 2 diabetes mellitus without complication, without long-term current use of insulin (H)       cholecalciferol 5000 UNITS Caps capsule    vitamin D3     Take by mouth daily        doxycycline monohydrate 100 MG capsule     20 capsule    Take 1 capsule (100 mg) by mouth 2 times daily for 10 days for sinus infection.    Acute sinusitis with symptoms > 10 days       FISH OIL      1 capsule every other day        lisinopril 2.5 MG tablet    PRINIVIL/Zestril    90 tablet    Take 1 tablet (2.5 mg) by mouth daily for kidneys in diabetes.    Type 2 diabetes mellitus without complication, without long-term current use of insulin (H)       MULTIVITAMIN PO      1 tab every other day        omeprazole 20 MG CR capsule    priLOSEC    90 capsule    Take 1 capsule (20 mg) by mouth daily for reflux.    Gastroesophageal reflux disease, esophagitis presence not specified       pravastatin 40 MG tablet    PRAVACHOL    90 tablet    Take 1 tablet (40 mg) by mouth every evening for cholesterol.    Hyperlipidemia LDL goal <100       predniSONE 20 MG tablet    DELTASONE    12 tablet    Take 1 tablet (20 mg) by mouth 2 times daily (with meals) for 6 days    Acute bronchitis, unspecified organism       traZODone 50 MG tablet    DESYREL    90 tablet    Take 1 tablet (50 mg) by mouth nightly as needed for sleep    Persistent disorder of initiating or maintaining sleep

## 2017-12-26 NOTE — PATIENT INSTRUCTIONS
At [facility], we strive to deliver an exceptional experience to you, every time we see you.  If you receive a survey in the mail, please send us back your thoughts. We really do value your feedback.    Based on your medical history, these are the current health maintenance/preventive care services that you are due for (some may have been done at this visit.)  Health Maintenance Due   Topic Date Due     MICROALBUMIN Q1 YEAR 12/08/2017     DIABETIC EDUCATION Q1 YEAR  12/15/2017     FOOT EXAM Q1 YEAR 01/09/2018     A1C Q6 MO  01/11/2018         Suggested websites for health information:  Www.[employer] : Up to date and easily searchable information on multiple topics.  Www.medlineplus.gov : medication info, interactive tutorials, watch real surgeries online  Www.familydoctor.org : good info from the Academy of Family Physicians  Www.cdc.gov : public health info, travel advisories, epidemics (H1N1)  Www.aap.org : children's health info, normal development, vaccinations  Www.health.[facility].[place].us : [place] dept of health, public health issues in [place], N1N1    Your care team:                            Family Medicine Internal Medicine   MD Tripp Wilde MD Shantel Branch-Fleming, MD Katya Georgiev PA-C Nam Ho, MD Pediatrics   LIZZETH Garzon, MD Desire Haynes CNP, MD Deborah Mielke, MD Kim Thein, APRN [facility]      Clinic hours: Monday - Thursday 7 am-7 pm; Fridays 7 am-5 pm.   Urgent care: Monday - Friday 11 am-9 pm; Saturday and Sunday 9 am-5 pm.  Pharmacy : Monday -Thursday 8 am-8 pm; Friday 8 am-6 pm; Saturday and Sunday 9 am-5 pm.     Clinic: [phone]   Pharmacy: [phone]    Essential Tremor (ET)  What is essential tremor?  Essential tremor (ET) is a neurological disorder that causes your hands, head, trunk, voice, or legs to shake rhythmically. It is often confused with Parkinson disease.  ET is the  most common trembling disorder that people have. Everyone has some ET, but the movements usually cannot be seen or felt. When tremors are noticeable, the condition is classified as ET.  ET is most common among people older than age 65, but it can affect people at any age.  What causes ET?  ET can occur in different people for different reasons:    Familial essential tremor. In most people, the condition seems to be passed down from a parent to a child. If your parent has ET, there is a 50% chance that you or your children will inherit the gene responsible for the condition.    Essential tremor related to another disorder. Sometimes, a tremor is a symptom of another neurological disorder, such as Parkinson disease or dystonia. Sometimes, ET is mistaken for these other diseases when they are not present. A healthcare provider s careful diagnosis is extremely important.  The cause of ET isn t known. However, one theory suggests that your cerebellum and other parts of your brain are not communicating correctly. The cerebellum is a part of the brain that controls muscle coordination.  What are the symptoms of ET?  If you have ET, you will have shaking and trembling at different times and in different situations, but some characteristics are common to all. Here is what you might typically experience:    Tremors occur when you move and are less noticeable when you rest.    Certain medicines, caffeine, or stress can make your tremors worse.    Tremor may improve with ingestion of a small amount of alcohol (such as wine)    Tremors get worse as you age.    Tremors don t affect both sides of your body in the same way.  Here are different signs of essential tremor:    Tremors that are most obvious in your hands    Difficulty doing tasks with your hands, such as writing or using tools    Shaking or quivering sound in your voice    Uncontrollable head-nodding    In rare instances, tremors in your legs or feet  How is ET  diagnosed?  Your rapid, uncontrollable trembling, as well as questions about your medical and family history, can help your healthcare provider determine if you have familial ET. He or she will probably need to rule out other conditions that could cause shaking or trembling. For example, tremors could be symptoms of diseases, such as hyperthyroidism. Your healthcare provider might test you for those, as well.  In some cases, the tremors might be related to other factors. To find out for certain, your healthcare provider may have you try to:    Abstain from heavy alcohol use; if you re an alcoholic, trembling is a common symptom.    Cut out cigarette smoking.    Avoid caffeine.    Avoid certain medicines  How is ET treated?   Propanolol and primidone are 2 medicines often prescribed to treat ET. Propanolol blocks the stimulating action of neurotransmitters to calm your trembling. Primidone is a common antiseizure medicine that also controls the actions of neurotransmitters.  Gabapentin and topiramate are 2 other antiseizure medicines that are sometimes prescribed. In some cases, tranquilizers like alprazolam or clonazepam might be suggested.  For ET in your hands, botulinum toxin (Botox) injections have shown some promise in easing the trembling. They work by weakening the surrounding muscles around your hands. For severe tremors, a stimulating device (deep brain stimulator) surgically implanted in your brain may help.  Can ET be prevented?   The specific cause of ET is not known, so scientists are not sure how the condition can be prevented.  Living with ET  ET is usually not dangerous, but it can certainly be frustrating if you have to deal with it. Certain factors can make tremors worse, so the following steps may help to decrease tremors:    Avoid alcohol, cigarettes, and caffeine    Avoid stressful situations as much as possible    Use relaxation techniques, such as yoga, deep-breathing exercises, or  biofeedback    Check with your healthcare provider to see if any medicines you re taking could be making your tremors worse.  Talk with your healthcare provider about other options, such as surgery, if ET starts to affect your quality of life.  When should I call my healthcare provider?  If you have been diagnosed with ET, talk with your healthcare provider about when you might need to call. He or she will likely advise you to call if your tremors become worse, or if you develop new neurologic symptoms, such as numbness or weakness.  Key points about essential tremors    ET is a neurological disorder that causes your hands, head, trunk, voice, or legs to shake rhythmically. The cause is not known, but it is often passed down from a parent to a child.    ET is sometimes confused with other types of tremor, so getting the right diagnosis is important.    Tremors tend to be worse during movement than when at rest. The tremors are usually not dangerous, but they can get worse over time.    Avoiding things that might make tremors worse, such as stress, caffeine, and certain medicines, may be helpful.    Medicines can also help control or limit tremors in some people. Severe tremors can sometimes be treated with surgery.  Next steps  Tips to help you get the most from a visit to your healthcare provider:    Know the reason for your visit and what you want to happen.    Before your visit, write down questions you want answered.    Bring someone with you to help you ask questions and remember what your provider tells you.    At the visit, write down the name of a new diagnosis, and any new medicines, treatments, or tests. Also write down any new instructions your provider gives you.    Know why a new medicine or treatment is prescribed, and how it will help you. Also know what the side effects are.    Ask if your condition can be treated in other ways.    Know why a test or procedure is recommended and what the results  could mean.    Know what to expect if you do not take the medicine or have the test or procedure.    If you have a follow-up appointment, write down the date, time, and purpose for that visit.    Know how you can contact your provider if you have questions.    [phone] [facility]. [address] [place] [address], [place] [address]. All rights reserved. This information is not intended as a substitute for professional medical care. Always follow your healthcare professional's instructions.

## 2018-01-22 ENCOUNTER — OFFICE VISIT (OUTPATIENT)
Dept: FAMILY MEDICINE | Facility: CLINIC | Age: 73
End: 2018-01-22
Payer: COMMERCIAL

## 2018-01-22 VITALS
DIASTOLIC BLOOD PRESSURE: 89 MMHG | SYSTOLIC BLOOD PRESSURE: 139 MMHG | HEART RATE: 68 BPM | WEIGHT: 131 LBS | TEMPERATURE: 97 F | OXYGEN SATURATION: 99 % | BODY MASS INDEX: 21.05 KG/M2 | HEIGHT: 66 IN

## 2018-01-22 DIAGNOSIS — E11.9 TYPE 2 DIABETES MELLITUS WITHOUT COMPLICATION, WITHOUT LONG-TERM CURRENT USE OF INSULIN (H): Primary | ICD-10-CM

## 2018-01-22 DIAGNOSIS — Z11.59 NEED FOR HEPATITIS B SCREENING TEST: ICD-10-CM

## 2018-01-22 DIAGNOSIS — E78.5 HYPERLIPIDEMIA LDL GOAL <100: ICD-10-CM

## 2018-01-22 DIAGNOSIS — G47.00 PERSISTENT DISORDER OF INITIATING OR MAINTAINING SLEEP: Chronic | ICD-10-CM

## 2018-01-22 LAB
ALT SERPL W P-5'-P-CCNC: 31 U/L (ref 0–70)
ANION GAP SERPL CALCULATED.3IONS-SCNC: 8 MMOL/L (ref 3–14)
BUN SERPL-MCNC: 14 MG/DL (ref 7–30)
CALCIUM SERPL-MCNC: 8.7 MG/DL (ref 8.5–10.1)
CHLORIDE SERPL-SCNC: 101 MMOL/L (ref 94–109)
CHOLEST SERPL-MCNC: 159 MG/DL
CO2 SERPL-SCNC: 29 MMOL/L (ref 20–32)
CREAT SERPL-MCNC: 0.86 MG/DL (ref 0.66–1.25)
CREAT UR-MCNC: 76 MG/DL
GFR SERPL CREATININE-BSD FRML MDRD: 87 ML/MIN/1.7M2
GLUCOSE SERPL-MCNC: 109 MG/DL (ref 70–99)
HBA1C MFR BLD: 5.8 % (ref 4.3–6)
HDLC SERPL-MCNC: 64 MG/DL
LDLC SERPL CALC-MCNC: 78 MG/DL
MICROALBUMIN UR-MCNC: <5 MG/L
MICROALBUMIN/CREAT UR: NORMAL MG/G CR (ref 0–17)
NONHDLC SERPL-MCNC: 95 MG/DL
POTASSIUM SERPL-SCNC: 3.8 MMOL/L (ref 3.4–5.3)
SODIUM SERPL-SCNC: 138 MMOL/L (ref 133–144)
TRIGL SERPL-MCNC: 83 MG/DL

## 2018-01-22 PROCEDURE — 82043 UR ALBUMIN QUANTITATIVE: CPT | Performed by: FAMILY MEDICINE

## 2018-01-22 PROCEDURE — 87340 HEPATITIS B SURFACE AG IA: CPT | Performed by: FAMILY MEDICINE

## 2018-01-22 PROCEDURE — 36415 COLL VENOUS BLD VENIPUNCTURE: CPT | Performed by: FAMILY MEDICINE

## 2018-01-22 PROCEDURE — 99214 OFFICE O/P EST MOD 30 MIN: CPT | Performed by: FAMILY MEDICINE

## 2018-01-22 PROCEDURE — 80048 BASIC METABOLIC PNL TOTAL CA: CPT | Performed by: FAMILY MEDICINE

## 2018-01-22 PROCEDURE — 99207 C FOOT EXAM  NO CHARGE: CPT | Performed by: FAMILY MEDICINE

## 2018-01-22 PROCEDURE — 80061 LIPID PANEL: CPT | Performed by: FAMILY MEDICINE

## 2018-01-22 PROCEDURE — 83036 HEMOGLOBIN GLYCOSYLATED A1C: CPT | Performed by: FAMILY MEDICINE

## 2018-01-22 PROCEDURE — 84460 ALANINE AMINO (ALT) (SGPT): CPT | Performed by: FAMILY MEDICINE

## 2018-01-22 RX ORDER — PRAVASTATIN SODIUM 40 MG
40 TABLET ORAL EVERY EVENING
Qty: 90 TABLET | Refills: 1 | Status: SHIPPED | OUTPATIENT
Start: 2018-01-22 | End: 2018-07-23

## 2018-01-22 RX ORDER — TRAZODONE HYDROCHLORIDE 50 MG/1
50 TABLET, FILM COATED ORAL
Qty: 90 TABLET | Refills: 1 | Status: SHIPPED | OUTPATIENT
Start: 2018-01-22 | End: 2018-07-23

## 2018-01-22 ASSESSMENT — PAIN SCALES - GENERAL: PAINLEVEL: NO PAIN (0)

## 2018-01-22 NOTE — PROGRESS NOTES
SUBJECTIVE:   Stephon Bryant is a 72 year old male who presents to clinic today for the following health issues:    Diabetes Follow-up      Patient is checking blood sugars: one times a month    Diabetic concerns: None     Symptoms of hypoglycemia (low blood sugar): weak, lethargy     Paresthesias (numbness or burning in feet) or sores: No     Date of last diabetic eye exam: about 6 month ago    BP Readings from Last 2 Encounters:   01/22/18 139/89   12/26/17 131/70     Hemoglobin A1C (%)   Date Value   01/22/2018 5.8   07/11/2017 5.4     LDL Cholesterol Calculated (mg/dL)   Date Value   07/11/2017 83   10/06/2016 112 (H)       Hyperlipidemia Follow-Up      Rate your low fat/cholesterol diet?: not sure    Taking statin?  Yes    Other lipid medications/supplements?:  Fish oil/Omega 3          Amount of exercise or physical activity: 4-5 days/week for an average of greater than 60 minutes    Problems taking medications regularly: No    Medication side effects: none  Diet: regular (no restrictions)      Past medical, family, and social histories, medications, and allergies are reviewed and updated in Epic.     ROS:  C: He mentions his feet and hands get very cold, especially at night. NEGATIVE for fever, chills, change in weight  E/M: NEGATIVE for ear, mouth and throat problems  R: NEGATIVE for significant cough or SOB  CV: NEGATIVE for chest pain, palpitations or peripheral edema  NEURO: He notes that he has been taking more trazodone than usual because he is finding it harder to fall asleep, and sometimes he wakes up and feels his heart racing if he does not take this medication.  ROS otherwise negative    This document serves as a record of the services and decisions personally performed and made by Dr. Cote. It was created on his behalf by Carmella Olvera, a trained medical scribe. The creation of this document is based the provider's statements to the medical scribe.  Carmella Olvera January 22, 2018 10:28 AM  "    OBJECTIVE:                                                    /89 (BP Location: Left arm, Patient Position: Chair, Cuff Size: Adult Regular)  Pulse 68  Temp 97  F (36.1  C) (Oral)  Ht 1.67 m (5' 5.75\")  Wt 59.4 kg (131 lb)  SpO2 99%  BMI 21.31 kg/m2   Body mass index is 21.31 kg/(m^2).     GENERAL: healthy, alert and no distress  EYES: Eyes grossly normal to inspection, PERRL, EOMI, sclerae white and conjunctivae normal  RESP: lungs clear to auscultation - no crackles or wheezes, no areas of dullness, no tachypnea  CV: Heart regular rate and rhythm without murmur, click or rub. No peripheral edema and peripheral pulses strong. 2+ DP and PT pulses bilaterally.   MS: no gross musculoskeletal defects noted, no edema  SKIN: no suspicious lesions or rashes  NEURO: Normal strength and tone, sensory exam grossly normal, mentation intact, oriented times 3 and cranial nerves 2-12 intact  PSYCH: mentation appears normal, affect normal/bright   Diabetic foot exam: normal DP and PT pulses, no trophic changes or ulcerative lesions, normal sensory exam and normal monofilament exam     Diagnostic Test Results:  Results for orders placed or performed in visit on 01/22/18 (from the past 24 hour(s))   Hemoglobin A1c   Result Value Ref Range    Hemoglobin A1C 5.8 4.3 - 6.0 %     Lab Results   Component Value Date    A1C 5.8 01/22/2018    A1C 5.4 07/11/2017    A1C 6.9 12/08/2016    A1C 5.7 08/01/2014    A1C 5.4 11/11/2013        ASSESSMENT/PLAN:                                                      (E11.9) Type 2 diabetes mellitus without complication, without long-term current use of insulin (H)  (primary encounter diagnosis)  Comment: Well controlled.  Plan: Albumin Random Urine Quantitative with Creat         Ratio, Basic metabolic panel, Hemoglobin A1c,         FOOT EXAM  NO CHARGE [94955.114]        Return in about 6 months (around 7/11/2018) for full physical, diabetes.     (E78.5) Hyperlipidemia LDL goal " <100  Comment: Fasting. historically at goal.  Plan: Lipid panel reflex to direct LDL Non-fasting,         ALT, pravastatin (PRAVACHOL) 40 MG tablet        Follow up in 6 months.     (G47.00) Persistent disorder of initiating or maintaining sleep  Comment: prescription update   Plan: traZODone (DESYREL) 50 MG tablet            (Z11.59) Need for hepatitis B screening test  Comment: Requested by the patient.  Plan: Hepatitis B surface antigen             The information in this document, created by the medical scribe for me, accurately reflects the services I personally performed and the decisions made by me. I have reviewed and approved this document for accuracy prior to leaving the patient care area. January 22, 2018 10:28 AM   Fidencio Cote MD

## 2018-01-22 NOTE — MR AVS SNAPSHOT
After Visit Summary   1/22/2018    Stephon Bryant    MRN: [id number]           Patient Information     Date Of Birth          1945        Visit Information        Provider Department      1/22/2018 10:00 AM Fidencio Cote MD [facility]        Today's Diagnoses     Type 2 diabetes mellitus without complication, without long-term current use of insulin (H)    -  1    Hyperlipidemia LDL goal <100        Persistent disorder of initiating or maintaining sleep        Need for hepatitis B screening test          Care Instructions    At [facility], we strive to deliver an exceptional experience to you, every time we see you.  If you receive a survey in the mail, please send us back your thoughts. We really do value your feedback.    Based on your medical history, these are the current health maintenance/preventive care services that you are due for (some may have been done at this visit.)  Health Maintenance Due   Topic Date Due     MICROALBUMIN Q1 YEAR  12/08/2017     DIABETIC EDUCATION Q1 YEAR  12/15/2017     FOOT EXAM Q1 YEAR  01/09/2018     A1C Q6 MO  01/11/2018         Suggested websites for health information:  Www.[employer].Blekko : Up to date and easily searchable information on multiple topics.  Www.medlineplus.gov : medication info, interactive tutorials, watch real surgeries online  Www.familydoctor.org : good info from the Academy of Family Physicians  Www.cdc.gov : public health info, travel advisories, epidemics (H1N1)  Www.aap.org : children's health info, normal development, vaccinations  Www.health.state.[place].us : [place] dept of health, public health issues in [place], N1N1    Your care team:                            Family Medicine Internal Medicine   MD Tripp Wilde MD Shantel Branch-Fleming, MD Katya Georgiev PA-C Nam Ho, MD Pediatrics   Jony Crawley PAEVGENY Segura, CNP Marry Villalobos APRN MD Desire Nesbitt  "MD Jennifer Mckeon MD Kim Thein, APRN CNP      Clinic hours: Monday - Thursday 7 am-7 pm; Fridays 7 am-5 pm.   Urgent care: Monday - Friday 11 am-9 pm; Saturday and Sunday 9 am-5 pm.  Pharmacy : Monday -Thursday 8 am-8 pm; Friday 8 am-6 pm; Saturday and Sunday 9 am-5 pm.     Clinic: [phone]   Pharmacy: [phone]            Follow-ups after your visit        Follow-up notes from your care team     Return in about 6 months (around 7/11/2018) for full physical, diabetes.      Who to contact     If you have questions or need follow up information about today's clinic visit or your schedule please contact [facility] directly at [phone].  Normal or non-critical lab and imaging results will be communicated to you by MyChart, letter or phone within 4 business days after the clinic has received the results. If you do not hear from us within 7 days, please contact the clinic through E[employer]io[employer] or phone. If you have a critical or abnormal lab result, we will notify you by phone as soon as possible.  Submit refill requests through [employer] or call your pharmacy and they will forward the refill request to us. Please allow 3 business days for your refill to be completed.          Additional Information About Your Visit        Ech[employer]io[employer] Information     [employer] gives you secure access to your electronic health record. If you see a primary care provider, you can also send messages to your care team and make appointments. If you have questions, please call your primary care clinic.  If you do not have a primary care provider, please call [phone] and they will assist you.        Care EveryWhere ID     This is your Care EveryWhere ID. This could be used by other organizations to access your [place] medical records  [id number]        Your Vitals Were     Pulse Temperature Height Pulse Oximetry BMI (Body Mass Index)       68 97  F (36.1  C) (Oral) 1.67 m (5' 5.75\") 99% 21.31 kg/m2     "    Blood Pressure from Last 3 Encounters:   01/22/18 139/89   12/26/17 131/70   11/28/17 135/75    Weight from Last 3 Encounters:   01/22/18 59.4 kg (131 lb)   12/26/17 60.8 kg (134 lb)   11/28/17 61.3 kg (135 lb 3.2 oz)              We Performed the Following     Albumin Random Urine Quantitative with Creat Ratio     ALT     Basic metabolic panel     FOOT EXAM  NO CHARGE [58495.114]     Hemoglobin A1c     Hepatitis B surface antigen     Lipid panel reflex to direct LDL Non-fasting          Where to get your medicines      These medications were sent to [id number] - [place], [place] - [address]  [address] [address]     Phone:  [phone]     pravastatin 40 MG tablet    traZODone 50 MG tablet          Primary Care Provider Office Phone # Fax #    Fidencio Cote [phone] [address]  [facility] [address]        Equal Access to Services     [facility] : Hadii aad ku hadasho Soomaali, waaxda luqadaha, qaybta kaalmada adeegyada, waxay idiin hayaan roryeg kharajoo lucia . So [facility] [phone].    ATENCIÓN: Si habla español, tiene a sims disposición servicios gratuitos de asistencia lingüística. [facility] [phone].    We comply with applicable federal civil rights laws and [place] laws. We do not discriminate on the basis of race, color, national origin, age, disability, sex, sexual orientation, or gender identity.            Thank you!     Thank you for choosing [facility]  for your care. Our goal is always to provide you with excellent care. Hearing back from our patients is one way we can continue to improve our services. Please take a few minutes to complete the written survey that you may receive in the mail after your visit with us. Thank you!             Your Updated Medication List - Protect others around you: Learn how to safely use, store and throw away your medicines at www.disposemymeds.org.          This  list is accurate as of: 1/22/18 10:41 AM.  Always use your most recent med list.                   Brand Name Dispense Instructions for use Diagnosis    aspirin 81 MG tablet      Take 1 tablet by mouth daily *.        BIOFLEX PO      Take 1 capful by mouth 1 capsule every other day.        cholecalciferol 5000 UNITS Caps capsule    vitamin D3     Take by mouth daily        FISH OIL      1 capsule every other day        lisinopril 2.5 MG tablet    PRINIVIL/Zestril    90 tablet    Take 1 tablet (2.5 mg) by mouth daily for kidneys in diabetes.    Type 2 diabetes mellitus without complication, without long-term current use of insulin (H)       MULTIVITAMIN PO      1 tab every other day        omeprazole 20 MG CR capsule    priLOSEC    90 capsule    Take 1 capsule (20 mg) by mouth daily for reflux.    Gastroesophageal reflux disease, esophagitis presence not specified       pravastatin 40 MG tablet    PRAVACHOL    90 tablet    Take 1 tablet (40 mg) by mouth every evening for cholesterol.    Hyperlipidemia LDL goal <100       traZODone 50 MG tablet    DESYREL    90 tablet    Take 1 tablet (50 mg) by mouth nightly as needed for sleep    Persistent disorder of initiating or maintaining sleep

## 2018-01-23 LAB — HBV SURFACE AG SERPL QL IA: NONREACTIVE

## 2018-03-03 DIAGNOSIS — E11.9 TYPE 2 DIABETES MELLITUS WITHOUT COMPLICATION, WITHOUT LONG-TERM CURRENT USE OF INSULIN (H): ICD-10-CM

## 2018-03-03 NOTE — TELEPHONE ENCOUNTER
"Requested Prescriptions   Pending Prescriptions Disp Refills     ONETOSpinnaker Coating VERIO IQ test strip [Pharmacy Med Name: OneTouch Verio In Vitro Strip]    Last Written Prescription Date:  12/15/16  Last Fill Quantity: 100,  # refills: 3   Last Office Visit with G, P or [facility] prescribing provider:  1/22/18   Future Office Visit:      100 each 2     Sig: use to test blood sugar 2 times daily or as directed    Diabetic Supplies Protocol Passed    3/3/2018  1:47 PM       Passed - Patient is 18 years of age or older       Passed - Recent (6 mo) or future (30 days) visit within the authorizing provider's specialty    Patient had office visit in the last 6 months or has a visit in the next 30 days with authorizing provider.  See \"Patient Info\" tab in inbasket, or \"Choose Columns\" in Meds & Orders section of the refill encounter.                  Vikas Faarax  [facility]  "

## 2018-03-06 RX ORDER — BLOOD SUGAR DIAGNOSTIC
STRIP MISCELLANEOUS
Qty: 100 EACH | Refills: 2 | Status: SHIPPED | OUTPATIENT
Start: 2018-03-06

## 2018-03-06 NOTE — TELEPHONE ENCOUNTER
Prescription approved per [facility] Refill Protocol.    Aisha Arora RN, BSN

## 2018-05-28 NOTE — PATIENT INSTRUCTIONS
================================================================================  Normal Values   Blood pressure  <140/90 for most adults    <130/80 for some chronic diseases (ask your care team about yours)    BMI (body mass index)  18.5-25 kg/m2 (based on height and weight)     Thank you for visiting [facility]    Normal or non-critical lab and imaging results will be communicated to you by MyChart, letter or phone within 7 days.  If you do not hear from us within 10 days, please call the clinic. If you have a critical or abnormal lab result, we will notify you by phone as soon as possible.     If you have any questions regarding your visit please contact:     Team Comfort:   Clinic Hours Telephone Number   Dr. Fidencio Castro 7am-5pm  Monday - Friday [phone]  Christiano RN  Fela Beaver RN   Pharmacy 8:00am-8pm Monday-Friday    9am-5pm Saturday-Sunday [phone]   Urgent Care 11am-9pm Monday-Friday        9am-5pm Saturday-Sunday [phone]     After hours, weekend or if you need to make an appointment with your primary provider please call [phone].   After Hours nurse advise: call [facility]: [phone]    Medication Refills:  Call your pharmacy and they will forward the refill to us. Please allow 3 business days for your refills to be completed.           None

## 2018-07-11 ENCOUNTER — TELEPHONE (OUTPATIENT)
Dept: FAMILY MEDICINE | Facility: CLINIC | Age: 73
End: 2018-07-11

## 2018-07-11 NOTE — TELEPHONE ENCOUNTER
Stephon Bryant is a 73 year old male who calls with chest pain.     PRESENTING PROBLEM:  Intermittent chest pain    NURSING ASSESSMENT:  Patient complains of intermittent symptoms of chest pain, dyspnea, right and left arm pain, dizzy when standing up  Onset:  1 weeks ago  Pain is characterized as burning   Severity 3 out of 10  Located left chest   Radiates to left arm and right arm.  Duration intermittent.  Associated symptoms SOB and indigestion.  Exacerbated by exertion.  Relieved by rest.  Cardiac risk factors: none .  Associated Medical History: GERD  Allergies:   Allergies   Allergen Reactions     No Clinical Screening - See Comments      No latex allergy     Penicillins        Last exam/Treatment:  1/22/18    NURSING PLAN: Nursing advice to patient go to ED now    RECOMMENDED DISPOSITION:  To ED now  Will comply with recommendation: Yes  If further questions/concerns or if symptoms do not improve, worsen or new symptoms develop, call your PCP or [facility] as soon as possible.  RN contacted [facility] where patient was going to be going and did warm hand off to charge nurse Cristy    Guideline used:  Telephone Triage Protocols for Nurses, Fifth Edition, Jenni Del Angel RN

## 2018-07-23 ENCOUNTER — OFFICE VISIT (OUTPATIENT)
Dept: FAMILY MEDICINE | Facility: CLINIC | Age: 73
End: 2018-07-23
Payer: COMMERCIAL

## 2018-07-23 VITALS
OXYGEN SATURATION: 98 % | RESPIRATION RATE: 14 BRPM | HEIGHT: 66 IN | HEART RATE: 73 BPM | SYSTOLIC BLOOD PRESSURE: 114 MMHG | TEMPERATURE: 97.8 F | DIASTOLIC BLOOD PRESSURE: 76 MMHG | BODY MASS INDEX: 21.58 KG/M2 | WEIGHT: 134.3 LBS

## 2018-07-23 DIAGNOSIS — Z00.00 ENCOUNTER FOR ROUTINE ADULT HEALTH EXAMINATION WITHOUT ABNORMAL FINDINGS: Primary | ICD-10-CM

## 2018-07-23 DIAGNOSIS — E78.5 HYPERLIPIDEMIA LDL GOAL <100: ICD-10-CM

## 2018-07-23 DIAGNOSIS — G47.00 PERSISTENT DISORDER OF INITIATING OR MAINTAINING SLEEP: Chronic | ICD-10-CM

## 2018-07-23 DIAGNOSIS — E11.9 TYPE 2 DIABETES MELLITUS WITHOUT COMPLICATION, WITHOUT LONG-TERM CURRENT USE OF INSULIN (H): ICD-10-CM

## 2018-07-23 DIAGNOSIS — R07.9 CHEST PAIN, UNSPECIFIED TYPE: ICD-10-CM

## 2018-07-23 DIAGNOSIS — K21.9 GASTROESOPHAGEAL REFLUX DISEASE WITHOUT ESOPHAGITIS: ICD-10-CM

## 2018-07-23 LAB
ALT SERPL W P-5'-P-CCNC: 29 U/L (ref 0–70)
CHOLEST SERPL-MCNC: 155 MG/DL
CREAT SERPL-MCNC: 0.84 MG/DL (ref 0.66–1.25)
GFR SERPL CREATININE-BSD FRML MDRD: 90 ML/MIN/1.7M2
HBA1C MFR BLD: 5.7 % (ref 0–5.6)
HDLC SERPL-MCNC: 51 MG/DL
LDLC SERPL CALC-MCNC: 81 MG/DL
NONHDLC SERPL-MCNC: 104 MG/DL
POTASSIUM SERPL-SCNC: 4.3 MMOL/L (ref 3.4–5.3)
TRIGL SERPL-MCNC: 113 MG/DL
TSH SERPL DL<=0.005 MIU/L-ACNC: 1.59 MU/L (ref 0.4–4)

## 2018-07-23 PROCEDURE — 36415 COLL VENOUS BLD VENIPUNCTURE: CPT | Performed by: FAMILY MEDICINE

## 2018-07-23 PROCEDURE — 80061 LIPID PANEL: CPT | Performed by: FAMILY MEDICINE

## 2018-07-23 PROCEDURE — 83036 HEMOGLOBIN GLYCOSYLATED A1C: CPT | Performed by: FAMILY MEDICINE

## 2018-07-23 PROCEDURE — 84132 ASSAY OF SERUM POTASSIUM: CPT | Performed by: FAMILY MEDICINE

## 2018-07-23 PROCEDURE — G0438 PPPS, INITIAL VISIT: HCPCS | Performed by: FAMILY MEDICINE

## 2018-07-23 PROCEDURE — 82565 ASSAY OF CREATININE: CPT | Performed by: FAMILY MEDICINE

## 2018-07-23 PROCEDURE — 99213 OFFICE O/P EST LOW 20 MIN: CPT | Mod: 25 | Performed by: FAMILY MEDICINE

## 2018-07-23 PROCEDURE — 84443 ASSAY THYROID STIM HORMONE: CPT | Performed by: FAMILY MEDICINE

## 2018-07-23 PROCEDURE — 84460 ALANINE AMINO (ALT) (SGPT): CPT | Performed by: FAMILY MEDICINE

## 2018-07-23 RX ORDER — PRAVASTATIN SODIUM 40 MG
40 TABLET ORAL EVERY EVENING
Qty: 90 TABLET | Refills: 1 | Status: SHIPPED | OUTPATIENT
Start: 2018-07-23 | End: 2019-02-26

## 2018-07-23 RX ORDER — ERYTHROMYCIN 5 MG/G
1 OINTMENT OPHTHALMIC
COMMUNITY
Start: 2017-04-07 | End: 2018-07-23

## 2018-07-23 RX ORDER — TRAZODONE HYDROCHLORIDE 50 MG/1
25-50 TABLET, FILM COATED ORAL
Qty: 90 TABLET | Refills: 1 | Status: SHIPPED | OUTPATIENT
Start: 2018-07-23 | End: 2019-12-24

## 2018-07-23 RX ORDER — LISINOPRIL 2.5 MG/1
2.5 TABLET ORAL DAILY
Qty: 90 TABLET | Refills: 3 | Status: SHIPPED | OUTPATIENT
Start: 2018-07-23 | End: 2019-08-27

## 2018-07-23 ASSESSMENT — PAIN SCALES - GENERAL: PAINLEVEL: NO PAIN (0)

## 2018-07-23 NOTE — PATIENT INSTRUCTIONS
Preventive Health Recommendations:       Male Ages 65 and over    Yearly exam:             See your health care provider every year in order to  o   Review health changes.   o   Discuss preventive care.    o   Review your medicines if your doctor has prescribed any.    Talk with your health care provider about whether you should have a test to screen for prostate cancer (PSA).    Every 3 years, have a diabetes test (fasting glucose). If you are at risk for diabetes, you should have this test more often.    Every 5 years, have a cholesterol test. Have this test more often if you are at risk for high cholesterol or heart disease.     Every 10 years, have a colonoscopy. Or, have a yearly FIT test (stool test). These exams will check for colon cancer.    Talk to with your health care provider about screening for Abdominal Aortic Aneurysm if you have a family history of AAA or have a history of smoking.  Shots:     Get a flu shot each year.     Get a tetanus shot every 10 years.     Talk to your doctor about your pneumonia vaccines. There are now two you should receive - Pneumovax (PPSV 23) and Prevnar (PCV 13).    Talk to your pharmacist about a shingles vaccine.     Talk to your doctor about the hepatitis B vaccine.  Nutrition:     Eat at least 5 servings of fruits and vegetables each day.     Eat whole-grain bread, whole-wheat pasta and brown rice instead of white grains and rice.     Get adequate Calcium and Vitamin D.   Lifestyle    Exercise for at least 150 minutes a week (30 minutes a day, 5 days a week). This will help you control your weight and prevent disease.     Limit alcohol to one drink per day.     No smoking.     Wear sunscreen to prevent skin cancer.     See your dentist every six months for an exam and cleaning.     See your eye doctor every 1 to 2 years to screen for conditions such as glaucoma, macular degeneration and cataracts.      Please call [facility]/[phone]  to schedule your heart stress test.

## 2018-07-23 NOTE — MR AVS SNAPSHOT
After Visit Summary   7/23/2018    Stephon Bryant    MRN: [id number]           Patient Information     Date Of Birth          1945        Visit Information        Provider Department      7/23/2018 10:00 AM Fidencio Cote MD [facility]        Today's Diagnoses     Encounter for routine adult health examination without abnormal findings    -  1    Chest pain, unspecified type        Type 2 diabetes mellitus without complication, without long-term current use of insulin (H)        Hyperlipidemia LDL goal <100        Persistent disorder of initiating or maintaining sleep        Gastroesophageal reflux disease without esophagitis          Care Instructions      Preventive Health Recommendations:       Male Ages 65 and over    Yearly exam:             See your health care provider every year in order to  o   Review health changes.   o   Discuss preventive care.    o   Review your medicines if your doctor has prescribed any.    Talk with your health care provider about whether you should have a test to screen for prostate cancer (PSA).    Every 3 years, have a diabetes test (fasting glucose). If you are at risk for diabetes, you should have this test more often.    Every 5 years, have a cholesterol test. Have this test more often if you are at risk for high cholesterol or heart disease.     Every 10 years, have a colonoscopy. Or, have a yearly FIT test (stool test). These exams will check for colon cancer.    Talk to with your health care provider about screening for Abdominal Aortic Aneurysm if you have a family history of AAA or have a history of smoking.  Shots:     Get a flu shot each year.     Get a tetanus shot every 10 years.     Talk to your doctor about your pneumonia vaccines. There are now two you should receive - Pneumovax (PPSV 23) and Prevnar (PCV 13).    Talk to your pharmacist about a shingles vaccine.     Talk to your doctor about the hepatitis B  vaccine.  Nutrition:     Eat at least 5 servings of fruits and vegetables each day.     Eat whole-grain bread, whole-wheat pasta and brown rice instead of white grains and rice.     Get adequate Calcium and Vitamin D.   Lifestyle    Exercise for at least 150 minutes a week (30 minutes a day, 5 days a week). This will help you control your weight and prevent disease.     Limit alcohol to one drink per day.     No smoking.     Wear sunscreen to prevent skin cancer.     See your dentist every six months for an exam and cleaning.     See your eye doctor every 1 to 2 years to screen for conditions such as glaucoma, macular degeneration and cataracts.      Please call [facility]/[phone] to schedule your heart stress test.           Follow-ups after your visit        Follow-up notes from your care team     Return in about 6 months (around 2019) for diabetes.      Future tests that were ordered for you today     Open Future Orders        Priority Expected Expires Ordered    Exercise Stress Echocardiogram Routine  2019            Who to contact     If you have questions or need follow up information about today's clinic visit or your schedule please contact [facility] directly at [phone].  Normal or non-critical lab and imaging results will be communicated to you by MyChart, letter or phone within 4 business days after the clinic has received the results. If you do not hear from us within 7 days, please contact the clinic through [employer] or phone. If you have a critical or abnormal lab result, we will notify you by phone as soon as possible.  Submit refill requests through [employer] or call your pharmacy and they will forward the refill request to us. Please allow 3 business days for your refill to be completed.          Additional Information About Your Visit        [employer] Information     [employer] gives you secure access to your electronic health  "record. If you see a primary care provider, you can also send messages to your care team and make appointments. If you have questions, please call your primary care clinic.  If you do not have a primary care provider, please call [phone] and they will assist you.        Care EveryWhere ID     This is your Care EveryWhere ID. This could be used by other organizations to access your [place] medical records  [id number]        Your Vitals Were     Pulse Temperature Respirations Height Pulse Oximetry BMI (Body Mass Index)    73 97.8  F (36.6  C) (Oral) 14 5' 5.75\" (1.67 m) 98% 21.84 kg/m2       Blood Pressure from Last 3 Encounters:   07/23/18 114/76   01/22/18 139/89   12/26/17 131/70    Weight from Last 3 Encounters:   07/23/18 134 lb 4.8 oz (60.9 kg)   01/22/18 131 lb (59.4 kg)   12/26/17 134 lb (60.8 kg)              We Performed the Following     ALT     Creatinine     Hemoglobin A1c     Lipid panel reflex to direct LDL Non-fasting     PAF COMPLETED     Potassium     TSH with free T4 reflex          Today's Medication Changes          These changes are accurate as of 7/23/18 11:01 AM.  If you have any questions, ask your nurse or doctor.               These medicines have changed or have updated prescriptions.        Dose/Directions    omeprazole 20 MG CR capsule   Commonly known as:  priLOSEC   This may have changed:    - when to take this  - reasons to take this   Used for:  Gastroesophageal reflux disease without esophagitis   Changed by:  Fidencio Cote MD        Dose:  20 mg   Take 1 capsule (20 mg) by mouth daily as needed for reflux.   Quantity:  90 capsule   Refills:  1       traZODone 50 MG tablet   Commonly known as:  DESYREL   This may have changed:  how much to take   Used for:  Persistent disorder of initiating or maintaining sleep   Changed by:  Fidencio Cote MD        Dose:  25-50 mg   Take 0.5-1 tablets (25-50 mg) by mouth nightly as needed for sleep   Quantity:  90 tablet   Refills: "  1            Where to get your medicines      These medications were sent to [id number] - [place], [place] - [address]  [address] [address]     Phone:  [phone]     lisinopril 2.5 MG tablet    omeprazole 20 MG CR capsule    pravastatin 40 MG tablet    traZODone 50 MG tablet                Primary Care Provider Office Phone # Fax #    Fidencio Cote [phone] [address]  [place] [facility] [address]        Equal Access to Services     [facility]: Hadii aad ku hadasho Soomaali, waaxda luqadaha, qaybta kaalmada adeegyada, waxay idiin hayaan adeeg kharash lacori . So [facility] [phone].    ATENCIÓN: Si habla español, tiene a sims disposición servicios gratuitos de asistencia lingüística. [facility] [phone].    We comply with applicable federal civil rights laws and [place] laws. We do not discriminate on the basis of race, color, national origin, age, disability, sex, sexual orientation, or gender identity.            Thank you!     Thank you for choosing [facility]  for your care. Our goal is always to provide you with excellent care. Hearing back from our patients is one way we can continue to improve our services. Please take a few minutes to complete the written survey that you may receive in the mail after your visit with us. Thank you!             Your Updated Medication List - Protect others around you: Learn how to safely use, store and throw away your medicines at www.disposemymeds.org.          This list is accurate as of 7/23/18 11:01 AM.  Always use your most recent med list.                   Brand Name Dispense Instructions for use Diagnosis    aspirin 81 MG tablet      Take 1 tablet by mouth daily *.        BIOFLEX PO      Take 1 capful by mouth 1 capsule every other day.        Blood Pressure Monitor Kit      As directed.        cholecalciferol 5000 units Caps capsule    vitamin D3     Take by mouth daily         FISH OIL      1 capsule every other day        lisinopril 2.5 MG tablet    PRINIVIL/Zestril    90 tablet    Take 1 tablet (2.5 mg) by mouth daily for kidneys in diabetes.    Type 2 diabetes mellitus without complication, without long-term current use of insulin (H)       MULTIVITAMIN PO      1 tab every other day        omeprazole 20 MG CR capsule    priLOSEC    90 capsule    Take 1 capsule (20 mg) by mouth daily as needed for reflux.    Gastroesophageal reflux disease without esophagitis       ONEUCH VERIO IQ test strip   Generic drug:  blood glucose monitoring     100 each    use to test blood sugar 2 times daily or as directed    Type 2 diabetes mellitus without complication, without long-term current use of insulin (H)       pravastatin 40 MG tablet    PRAVACHOL    90 tablet    Take 1 tablet (40 mg) by mouth every evening for cholesterol.    Hyperlipidemia LDL goal <100       traZODone 50 MG tablet    DESYREL    90 tablet    Take 0.5-1 tablets (25-50 mg) by mouth nightly as needed for sleep    Persistent disorder of initiating or maintaining sleep

## 2018-07-23 NOTE — PROGRESS NOTES
SUBJECTIVE:   Stephon Bryant is a 73 year old male who presents for Preventive Visit.    Are you in the first 12 months of your Medicare Part B coverage?  No    Healthy Habits:    Do you get at least three servings of calcium containing foods daily (dairy, green leafy vegetables, etc.)? yes    Amount of exercise or daily activities, outside of work: 2 day(s) per week    Problems taking medications regularly: no    Medication side effects: no    Have you had an eye exam in the past two years? yes    Do you see a dentist twice per year? no    Do you have sleep apnea, excessive snoring or daytime drowsiness? no      Ability to successfully perform activities of daily living: Yes, no assistance needed    Home safety: none identified     Hearing impairment: no    Fall risk:      Fallen 2 or more times in the past year?: No  Any fall with injury in the past year?: No    COGNITIVE SCREEN  1) Repeat 3 items (Leader, Season, Table)    2) Clock draw: NORMAL  3) 3 item recall: Recalls 1 object   Results: NORMAL clock, 1-2 items recalled: COGNITIVE IMPAIRMENT LESS LIKELY    Mini-CogTM Copyright S [employer]. Licensed by the author for use in [facility]; reprinted with permission (young@[facility]). All rights reserved.      Additional concerns:  Patient was seen in the ER 7/11/18 due to intermittent chest pain x1 week. Examination, vitals, and EKG normal. It was recommended he follow up with a stress echo since his last was in 2016.      Reviewed and updated as needed this visit by clinical staff  Tobacco  Allergies  Meds        Reviewed and updated as needed this visit by Provider        Social History   Substance Use Topics     Smoking status: Former Smoker     Packs/day: 0.30     Years: 7.00     Types: Cigarettes     Smokeless tobacco: Never Used      Comment: [id number] (in service)     Alcohol use No       If you drink alcohol do you typically have >3 drinks per day or >7 drinks per week? No                         Today's PHQ-2 Score:   PHQ-2 ( 1999 [employer]) 7/23/2018 1/22/2018   Q1: Little interest or pleasure in doing things 0 0   Q2: Feeling down, depressed or hopeless 0 0   PHQ-2 Score 0 0     Do you feel safe in your environment - Yes    Do you have a Health Care Directive?: No: Advance care planning reviewed with patient; information given to patient to review.    Current providers sharing in care for this patient include:   Patient Care Team:  Fidencio Cote MD as PCP - Amy Mcleod RN as     The following health maintenance items are reviewed in Epic and correct as of today:  Health Maintenance   Topic Date Due     DIABETIC EDUCATION Q1 YEAR  12/15/2017     EYE EXAM Q1 YEAR  03/14/2018     A1C Q6 MO  07/22/2018     INFLUENZA VACCINE (1) 09/01/2018     TSH W/ FREE T4 REFLEX Q2 YEAR  10/06/2018     FOOT EXAM Q1 YEAR  01/22/2019     CREATININE Q1 YEAR  01/22/2019     FALL RISK ASSESSMENT  01/22/2019     LIPID MONITORING Q1 YEAR  01/22/2019     MICROALBUMIN Q1 YEAR  01/22/2019     PHQ-2 Q1 YR  01/22/2019     ADVANCE DIRECTIVE PLANNING Q5 YRS  07/11/2022     TETANUS IMMUNIZATION (SYSTEM ASSIGNED)  11/30/2022     COLON CANCER SCREEN (SYSTEM ASSIGNED)  12/18/2023     PNEUMOCOCCAL  Completed     AORTIC ANEURYSM SCREENING (SYSTEM ASSIGNED)  Completed     HEPATITIS C SCREENING  Completed     ROS:  Constitutional, HEENT, cardiovascular, pulmonary, GI, , musculoskeletal, neuro, skin, endocrine and psych systems are negative, except as otherwise noted.    This document serves as a record of the services and decisions personally performed and made by Dr. Cote. It was created on his behalf by Leatha Goncalves, a trained medical scribe. The creation of this document is based the provider's statements to the medical scribe.  Leatha Goncalves July 23, 2018 10:34 AM     OBJECTIVE:   /76 (BP Location: Left arm, Patient Position: Chair, Cuff Size: Adult Regular)  Pulse 73  Temp 97.8  F (36.6  C)  "(Oral)  Resp 14  Ht 5' 5.75\" (1.67 m)  Wt 134 lb 4.8 oz (60.9 kg)  SpO2 98%  BMI 21.84 kg/m2 Estimated body mass index is 21.84 kg/(m^2) as calculated from the following:    Height as of this encounter: 5' 5.75\" (1.67 m).    Weight as of this encounter: 134 lb 4.8 oz (60.9 kg).    EXAM:   GENERAL: healthy, alert and no distress  EYES: Eyes grossly normal to inspection, PERRL and conjunctivae and sclerae normal  HENT: ear canals and TM's normal, nose and mouth without ulcers or lesions  NECK: no adenopathy, no asymmetry, masses, or scars and thyroid normal to palpation  RESP: lungs clear to auscultation - no rales, rhonchi or wheezes  CV: regular rate and rhythm, normal S1 S2, no S3 or S4, no murmur, click or rub, no peripheral edema and peripheral pulses strong  ABDOMEN: soft, nontender, no hepatosplenomegaly, no masses and bowel sounds normal   (male): normal male genitalia without lesions or urethral discharge, no hernia  MS: no gross musculoskeletal defects noted, no edema  SKIN: no suspicious lesions or rashes  NEURO: Normal strength and tone, sensory exam grossly normal, mentation intact, cranial nerves 2-12 intact and DTR's normal and symmetric   PSYCH: mentation appears normal, affect normal/bright    Diagnostic Test Results:  none   ASSESSMENT / PLAN:   (Z00.00) Encounter for routine adult health examination without abnormal findings  (primary encounter diagnosis)  Comment: Negative screening exam; up-to-date on preventive services.   Plan: follow up in 1 year for ALEXIS    (R07.9) Chest pain, unspecified type  Comment: negative stress test a few years ago, but he is a diabetic and now he is older  Plan: Exercise Stress Echocardiogram        follow up per stress test results    (E11.9) Type 2 diabetes mellitus without complication, without long-term current use of insulin (H)  Comment: lab and prescription update (he did not do previsit labs)  Plan: lisinopril (PRINIVIL/ZESTRIL) 2.5 MG tablet,         " "Hemoglobin A1c, TSH with free T4 reflex,         Creatinine, Potassium        Return in about 6 months (around 1/23/2019) for diabetes.     (E78.5) Hyperlipidemia LDL goal <100  Comment: historically at goal   Plan: pravastatin (PRAVACHOL) 40 MG tablet, Lipid         panel reflex to direct LDL Non-fasting, ALT        Follow up in 6 months.    (G47.00) Persistent disorder of initiating or maintaining sleep  Comment: Refill request. The patient reports that sometimes he feels too sleepy when he gets up in the morning, so I will have him try taking half a tab.  Plan: traZODone (DESYREL) 50 MG tablet          (K21.9) Gastroesophageal reflux disease without esophagitis  Comment: prescription update, he does not take it daily anymore  Plan: omeprazole (PRILOSEC) 20 MG CR capsule                  End of Life Planning:  Patient currently has an advanced directive: No.  I have verified the patient's ablity to prepare an advanced directive/make health care decisions. Literature was provided last year to assist patient in preparing an advanced directive.    COUNSELING:  Reviewed preventive health counseling, as reflected in patient instructions    BP Readings from Last 1 Encounters:   07/23/18 114/76     Estimated body mass index is 21.84 kg/(m^2) as calculated from the following:    Height as of this encounter: 5' 5.75\" (1.67 m).    Weight as of this encounter: 134 lb 4.8 oz (60.9 kg).     reports that he has quit smoking. His smoking use included Cigarettes. He has a 2.10 pack-year smoking history. He has never used smokeless tobacco.    Appropriate preventive services were discussed with this patient, including applicable screening as appropriate for cardiovascular disease, diabetes, osteopenia/osteoporosis, and glaucoma.  As appropriate for age/gender, discussed screening for colorectal cancer, prostate cancer, breast cancer, and cervical cancer. Checklist reviewing preventive services available has been given to the " patient.    Reviewed patients plan of care and provided an AVS. The Basic Care Plan (routine screening as documented in Health Maintenance) for Stephon meets the Care Plan requirement. This Care Plan has been established and reviewed with the patient.    Counseling Resources:  ATP IV Guidelines  Pooled Cohorts Equation Calculator  Breast Cancer Risk Calculator  FRAX Risk Assessment  ICSI Preventive Guidelines  Dietary Guidelines for Americans, 2010  USDA's MyPlate  ASA Prophylaxis  Lung CA Screening    The information in this document, created by the medical scribe for me, accurately reflects the services I personally performed and the decisions made by me. I have reviewed and approved this document for accuracy prior to leaving the patient care area. July 23, 2018 10:34 AM      Fidencio Cote MD  [facility]

## 2018-07-31 ENCOUNTER — TELEPHONE (OUTPATIENT)
Dept: CARDIOLOGY | Facility: CLINIC | Age: 73
End: 2018-07-31

## 2018-07-31 NOTE — TELEPHONE ENCOUNTER
Called patient prior to stress echo scheduled for 8/1 to discuss necessary preparation for test and assess for questions/concerns, however, no answer.     If patient calls, please reminded patient to remain NPO except water for at least 3 hours prior to test, take all medications as usual, to avoid caffeine and nicotine for 6 hr (including tea, chocolate, decaf, Excedrin) and to wear comfortable clothing and shoes. Also inform patient that procedure requires an IV and is on a recumbent bike rather than treadmill.

## 2018-08-01 ENCOUNTER — RADIANT APPOINTMENT (OUTPATIENT)
Dept: CARDIOLOGY | Facility: CLINIC | Age: 73
End: 2018-08-01
Attending: FAMILY MEDICINE
Payer: COMMERCIAL

## 2018-08-01 VITALS — SYSTOLIC BLOOD PRESSURE: 113 MMHG | DIASTOLIC BLOOD PRESSURE: 72 MMHG | HEART RATE: 65 BPM

## 2018-08-01 DIAGNOSIS — R07.9 CHEST PAIN, UNSPECIFIED TYPE: ICD-10-CM

## 2018-08-01 PROCEDURE — 93018 CV STRESS TEST I&R ONLY: CPT | Performed by: INTERNAL MEDICINE

## 2018-08-01 PROCEDURE — 93350 STRESS TTE ONLY: CPT | Mod: TC

## 2018-08-01 PROCEDURE — 93321 DOPPLER ECHO F-UP/LMTD STD: CPT | Mod: 26 | Performed by: INTERNAL MEDICINE

## 2018-08-01 PROCEDURE — 93321 DOPPLER ECHO F-UP/LMTD STD: CPT | Mod: TC

## 2018-08-01 PROCEDURE — 93325 DOPPLER ECHO COLOR FLOW MAPG: CPT | Mod: TC

## 2018-08-01 PROCEDURE — 93352 ADMIN ECG CONTRAST AGENT: CPT

## 2018-08-01 PROCEDURE — 93325 DOPPLER ECHO COLOR FLOW MAPG: CPT | Mod: 26 | Performed by: INTERNAL MEDICINE

## 2018-08-01 PROCEDURE — 93350 STRESS TTE ONLY: CPT | Mod: 26 | Performed by: INTERNAL MEDICINE

## 2018-08-01 PROCEDURE — 93016 CV STRESS TEST SUPVJ ONLY: CPT

## 2018-08-01 PROCEDURE — 93017 CV STRESS TEST TRACING ONLY: CPT

## 2018-08-01 RX ADMIN — Medication 10 ML: at 10:59

## 2018-08-01 NOTE — PROGRESS NOTES
Patient presents today for stress echo ordered by MD. Prior to patient visit, chart prep by CMA done including confirmation of order, medications reviewed for contraindications, reviewed previous EKG's for trends & concerns and reviewed patient's medical history.    IV started in L AC with a 22G Jeclo Catheter.     Echo technician completed resting portion of echo.    Stress portion of echo completed utilizing bike and pictures taken at peak.  Blood pressure taken every 2 minutes and documented in Muse system.    Difinity medication used 6cc, wasted 4cc Definity NDC # 37195-726-51 (1.5ml Definity mixed with 8.5ml Saline )  Atropine medication given  NONE Atropine NDC# 55353-768-23     Patient offered complaints of: NONE    After completion of stress echo, recovery period with blood pressure monitoring occurs at 1, 3 and 5 minutes and documented in Muse.  IV removed and water provided to patient.    Patient education provided about cardiology interpretation and primary provider will be notified of results.    Dr. Medrano provided supervision of the tests performed today.    Yesy Newell, CCT, Cardiac CMA

## 2018-10-18 ENCOUNTER — OFFICE VISIT (OUTPATIENT)
Dept: FAMILY MEDICINE | Facility: CLINIC | Age: 73
End: 2018-10-18
Payer: COMMERCIAL

## 2018-10-18 VITALS
OXYGEN SATURATION: 96 % | BODY MASS INDEX: 22.25 KG/M2 | DIASTOLIC BLOOD PRESSURE: 79 MMHG | HEART RATE: 84 BPM | TEMPERATURE: 97.7 F | SYSTOLIC BLOOD PRESSURE: 139 MMHG | WEIGHT: 136.8 LBS

## 2018-10-18 DIAGNOSIS — N40.1 BENIGN PROSTATIC HYPERPLASIA WITH WEAK URINARY STREAM: ICD-10-CM

## 2018-10-18 DIAGNOSIS — M75.40 IMPINGEMENT SYNDROME OF SHOULDER REGION, UNSPECIFIED LATERALITY: ICD-10-CM

## 2018-10-18 DIAGNOSIS — R42 VERTIGO: Primary | ICD-10-CM

## 2018-10-18 DIAGNOSIS — Z12.5 SCREENING FOR PROSTATE CANCER: ICD-10-CM

## 2018-10-18 DIAGNOSIS — Z23 NEED FOR PROPHYLACTIC VACCINATION AND INOCULATION AGAINST INFLUENZA: ICD-10-CM

## 2018-10-18 DIAGNOSIS — R39.12 BENIGN PROSTATIC HYPERPLASIA WITH WEAK URINARY STREAM: ICD-10-CM

## 2018-10-18 LAB — PSA SERPL-ACNC: 1.59 UG/L (ref 0–4)

## 2018-10-18 PROCEDURE — 90662 IIV NO PRSV INCREASED AG IM: CPT | Performed by: FAMILY MEDICINE

## 2018-10-18 PROCEDURE — 84153 ASSAY OF PSA TOTAL: CPT | Performed by: FAMILY MEDICINE

## 2018-10-18 PROCEDURE — G0008 ADMIN INFLUENZA VIRUS VAC: HCPCS | Performed by: FAMILY MEDICINE

## 2018-10-18 PROCEDURE — 99214 OFFICE O/P EST MOD 30 MIN: CPT | Mod: 25 | Performed by: FAMILY MEDICINE

## 2018-10-18 RX ORDER — TAMSULOSIN HYDROCHLORIDE 0.4 MG/1
0.4 CAPSULE ORAL DAILY
Qty: 90 CAPSULE | Refills: 1 | Status: SHIPPED | OUTPATIENT
Start: 2018-10-18 | End: 2019-02-26

## 2018-10-18 RX ORDER — MECLIZINE HYDROCHLORIDE 25 MG/1
25 TABLET ORAL 3 TIMES DAILY PRN
Qty: 60 TABLET | Refills: 0 | Status: SHIPPED | OUTPATIENT
Start: 2018-10-18

## 2018-10-18 NOTE — PATIENT INSTRUCTIONS
Understanding Shoulder Impingement Syndrome    Shoulder impingement syndrome is a problem with the shoulder joint. It occurs when certain parts within the joint swell and are pinched. This can cause nagging pain and problems with moving the arm.  What causes shoulder impingement syndrome?  It is possible to develop impingement after years of normal shoulder use. But in most cases the condition occurs because of repeated overhead movements. These include such things as stocking shelves, painting, swimming, and throwing. These movements can irritate parts of the shoulder, leading to swelling. Swollen parts of the shoulder take up more room, making the joint space smaller. Some parts that may be involved include:    A sac of fluid (bursa) that cushions the shoulder joint.  When the bursa is irritated, it may lead to a condition called bursitis. This is when the bursa swells with fluid, filling and squeezing the joint space.    Fibrous tissues (tendons) that connect muscle to bone. When tendons are irritated, they may become swollen. This is called tendonitis.    The end (acromion) of the shoulder blade. This bone may be flat or hooked. If the acromion is hooked, the joint space may be smaller than normal. Growths (bone spurs) on the acromion can also narrow the joint space. Acromion problems don t often cause impingement. But they can make it worse.  Symptoms of shoulder impingement syndrome  Symptoms include pain, pinching, or stiffness in your shoulder. Pain often comes with movement, particularly reaching overhead or backward. It may also be felt when the shoulder is at rest. Pain at night during sleep is common.  Treatment for shoulder impingement syndrome  Treatment will depend on the cause of the problem, how bad the problem is, and if other parts of the shoulder are damaged. Treatment may include the following:    Active rest. This allows the shoulder to heal. It means using the arm and shoulder, but avoiding  activities that cause pain. These likely include reaching overhead or sleeping on your shoulder.    Cold packs. These help reduce swelling and relieve pain.    Prescription or over-the-counter pain medicines. These help relieve pain and swelling.    Arm and shoulder exercises. These help keep your shoulder joint mobile as it heals. They also help improve muscle strength around the joint.    Shots of medicine into the joint. This can help reduce swelling and pain for a short time.  If other measures don t work to relieve symptoms, you may need surgery. This opens up space in the joint to allow pain-free motion.  Possible complications of shoulder impingement syndrome  It might be tempting to stop using your shoulder completely to avoid pain. But doing so may lead to a condition called frozen shoulder. To help prevent this, follow instructions you are given for active rest and for doing exercises to help your shoulder heal.  When to call your healthcare provider  Call your healthcare provider right away if you have any of these:    Fever of 100.4 F (38 C) or higher, or as directed    Symptoms that don t get better, or get worse    New symptoms   Date Last Reviewed: 3/10/2016    [id number] [employer]. [address] [place] [address]. All rights reserved. This information is not intended as a substitute for professional medical care. Always follow your healthcare professional's instructions.

## 2018-10-18 NOTE — PROGRESS NOTES
SUBJECTIVE:   Stephon Bryant is a 73 year old male who presents to clinic today for the following health issues:    Joint Pain    Onset: ongoing    Description:   Location: left shoulder and right shoulder  Character: Sharp    Intensity: 5-6/10    Progression of Symptoms: worse    Accompanying Signs & Symptoms:  Other symptoms: when raising arms up above his head it is painful. During the day it is mildly painful, but when he is sleeping and he moves his arms the pain is worse     History:   Previous similar pain: YES- only on right arm      Precipitating factors:   Trauma or overuse: no  Worse after laying down for sleep     Alleviating factors:  Improved by: Advil pm  Therapies Tried and outcome:  Advil pm- helped, Cortizone shot, therapy    Dizziness  Onset: couple months    Description:   Do you feel faint:  no   Does it feel like the surroundings (bed, room) are moving: YES  Unsteady/off balance: YES  Have you passed out or fallen: no   When waking up in the morning and when standing up. Worse when looking up.    Intensity: 5-6/10    Progression of Symptoms: same    Accompanying Signs & Symptoms:  Heart palpitations: no - had stress test done  Nausea, vomiting: no   Weakness in arms or legs: YES- sometimes in legs  Fatigue: YES- sometimes  Vision or speech changes: no   Ringing in ears (Tinnitus): no   Hearing Loss: no     History:   Head trauma/concussion hx: no   Previous similar symptoms: YES- once a while- inner ear was a problem  Recent bleeding history: no     Precipitating factors:   Worse with activity or head movement: no   Any new medications (BP?): no   Alcohol/drug abuse/withdrawal: no     Alleviating factors:   Does staying in a fixed position give relief:  YES  Therapies Tried and outcome: none    Concern: going to [place] in November and would like to know if he needs any vaccination   Concern: Urine flow is slow, did not have a test to check prostate would like to get that done    Past  medical, family, and social histories, medications, and allergies are reviewed and updated in [facility].     ROS:  CONSTITUTIONAL: NEGATIVE for fever, chills, change in weight  ENT/MOUTH: NEGATIVE for ear, mouth and throat problems  RESP: NEGATIVE for significant cough or SOB  CV: NEGATIVE for chest pain, palpitations or peripheral edema  ROS otherwise negative    This document serves as a record of the services and decisions personally performed and made by Dr. Cote. It was created on his behalf by Leatha Goncalves, a trained medical scribe. The creation of this document is based the provider's statements to the medical scribe.  Leatha Goncalves October 18, 2018 10:21 AM     OBJECTIVE:                                                    /79 (BP Location: Left arm, Patient Position: Chair, Cuff Size: Adult Regular)  Pulse 84  Temp 97.7  F (36.5  C) (Tympanic)  Wt 62.1 kg (136 lb 12.8 oz)  SpO2 96%  BMI 22.25 kg/m2   Body mass index is 22.25 kg/(m^2).   GENERAL: healthy, alert and no distress  EYES: Eyes grossly normal to inspection, PERRL and conjunctivae and sclerae normal  HENT: normal cephalic/atraumatic, both ears: normal: no effusions, no erythema, normal landmarks, nose and mouth without ulcers or lesions, oropharynx clear and oral mucous membranes moist  RESP: lungs clear to auscultation - no rales, rhonchi or wheezes  CV: regular rate and rhythm, normal S1 S2, no S3 or S4, no murmur, click or rub, no peripheral edema and peripheral pulses strong  MS: no gross musculoskeletal defects noted, no edema  SKIN: no suspicious lesions or rashes  NEURO: Normal strength and tone, mentation intact and speech normal  PSYCH: mentation appears normal, affect normal/bright    Diagnostic Test Results:  none      ASSESSMENT/PLAN:                                                      (R42) Vertigo  (primary encounter diagnosis)  Comment: he reports a history of inner ear problems and I see he has a history of perforated TM. I am  unable to visualize his right TM due to cerumen impaction.  Plan: OTOLARYNGOLOGY REFERRAL, meclizine (ANTIVERT)         25 MG tablet          (M75.40) Impingement syndrome of shoulder region, unspecified laterality  Comment: right>left. Patient declines medication for this.  Plan: ORTHO  REFERRAL        Handout provided.    (N40.1,  R39.12) Benign prostatic hyperplasia with weak urinary stream  Comment:   Plan: Prostate spec antigen screen, tamsulosin         (FLOMAX) 0.4 MG capsule        Follow up as needed.     (Z12.5) Screening for prostate cancer  Comment: requested. his most recent PSA was normal  Plan: Prostate spec antigen screen          (Z23) Need for prophylactic vaccination and inoculation against influenza  Comment: Influenza vaccine requested and given.   Plan: HC FLU VACCINE, INCREASED ANTIGEN, PRESV FREE            The information in this document, created by the medical scribe for me, accurately reflects the services I personally performed and the decisions made by me. I have reviewed and approved this document for accuracy prior to leaving the patient care area. October 18, 2018 10:21 AM   Fidencio Cote MD                               Injectable Influenza Immunization Documentation  1.  Is the person to be vaccinated sick today?   No  2. Does the person to be vaccinated have an allergy to a component   of the vaccine?   No  Egg Allergy Algorithm Link  3. Has the person to be vaccinated ever had a serious reaction   to influenza vaccine in the past?   No  4. Has the person to be vaccinated ever had Guillain-Barré syndrome?   No  Form completed by Denisse Callahan CMA

## 2018-10-18 NOTE — MR AVS SNAPSHOT
After Visit Summary   10/18/2018    Stephon Bryant    MRN: [id number]           Patient Information     Date Of Birth          1945        Visit Information        Provider Department      10/18/2018 10:00 AM Fidencio Cote MD [facility]        Today's Diagnoses     Vertigo    -  1    Impingement syndrome of shoulder region, unspecified laterality        Benign prostatic hyperplasia with weak urinary stream        Screening for prostate cancer        Need for prophylactic vaccination and inoculation against influenza          Care Instructions      Understanding Shoulder Impingement Syndrome    Shoulder impingement syndrome is a problem with the shoulder joint. It occurs when certain parts within the joint swell and are pinched. This can cause nagging pain and problems with moving the arm.  What causes shoulder impingement syndrome?  It is possible to develop impingement after years of normal shoulder use. But in most cases the condition occurs because of repeated overhead movements. These include such things as stocking shelves, painting, swimming, and throwing. These movements can irritate parts of the shoulder, leading to swelling. Swollen parts of the shoulder take up more room, making the joint space smaller. Some parts that may be involved include:    A sac of fluid (bursa) that cushions the shoulder joint.  When the bursa is irritated, it may lead to a condition called bursitis. This is when the bursa swells with fluid, filling and squeezing the joint space.    Fibrous tissues (tendons) that connect muscle to bone. When tendons are irritated, they may become swollen. This is called tendonitis.    The end (acromion) of the shoulder blade. This bone may be flat or hooked. If the acromion is hooked, the joint space may be smaller than normal. Growths (bone spurs) on the acromion can also narrow the joint space. Acromion problems don t often cause impingement. But they can  make it worse.  Symptoms of shoulder impingement syndrome  Symptoms include pain, pinching, or stiffness in your shoulder. Pain often comes with movement, particularly reaching overhead or backward. It may also be felt when the shoulder is at rest. Pain at night during sleep is common.  Treatment for shoulder impingement syndrome  Treatment will depend on the cause of the problem, how bad the problem is, and if other parts of the shoulder are damaged. Treatment may include the following:    Active rest. This allows the shoulder to heal. It means using the arm and shoulder, but avoiding activities that cause pain. These likely include reaching overhead or sleeping on your shoulder.    Cold packs. These help reduce swelling and relieve pain.    Prescription or over-the-counter pain medicines. These help relieve pain and swelling.    Arm and shoulder exercises. These help keep your shoulder joint mobile as it heals. They also help improve muscle strength around the joint.    Shots of medicine into the joint. This can help reduce swelling and pain for a short time.  If other measures don t work to relieve symptoms, you may need surgery. This opens up space in the joint to allow pain-free motion.  Possible complications of shoulder impingement syndrome  It might be tempting to stop using your shoulder completely to avoid pain. But doing so may lead to a condition called frozen shoulder. To help prevent this, follow instructions you are given for active rest and for doing exercises to help your shoulder heal.  When to call your healthcare provider  Call your healthcare provider right away if you have any of these:    Fever of 100.4 F (38 C) or higher, or as directed    Symptoms that don t get better, or get worse    New symptoms   Date Last Reviewed: 3/10/2016    2014-2444 [employer]. [address] [place] [address], [place] [address]. All rights reserved. This information is not intended as a substitute for  professional medical care. Always follow your healthcare professional's instructions.                Follow-ups after your visit        Additional Services     ORTHO  REFERRAL       [facility] is referring you to the Orthopedic  Services at [facility].       The  Representative will assist you in the coordination of your Orthopedic and Musculoskeletal Care as prescribed by your physician.    The  Representative will call you within 1 business day to help schedule your appointment, or you may contact the  Representative at:    All areas ~ [phone]     Type of Referral : Surgical / Specialist       Timeframe requested: Within 2 weeks    Coverage of these services is subject to the terms and limitations of your health insurance plan.  Please call member services at your health plan with any benefit or coverage questions.      If X-rays, CT or MRI's have been performed, please contact the facility where they were done to arrange for , prior to your scheduled appointment.  Please bring this referral request to your appointment and present it to your specialist.            OTOLARYNGOLOGY REFERRAL       Your provider has referred you to:     [facility] [phone]   [email or web address]  [facility] [phone]   [email or web address]  [facility] [phone]   [email or web address]  [facility] [phone]   [email or web address]    Please be aware that coverage of these services is subject to the terms and limitations of your health insurance plan.  Call member services at your health plan with any benefit or coverage questions.      Please bring the following with you to your appointment:    (1) Any X-Rays, CTs or MRIs which  have been performed.  Contact the facility where they were done to arrange for  prior to your scheduled appointment.   (2) List of current medications  (3) This referral request   (4) Any documents/labs given to you for this referral                  Follow-up notes from your care team     Return in about 3 months (around 1/23/2019) for diabetes.      Who to contact     If you have questions or need follow up information about today's clinic visit or your schedule please contact [facility] directly at [phone].  Normal or non-critical lab and imaging results will be communicated to you by [employer], letter or phone within 4 business days after the clinic has received the results. If you do not hear from us within 7 days, please contact the clinic through [employer] or phone. If you have a critical or abnormal lab result, we will notify you by phone as soon as possible.  Submit refill requests through [employer] or call your pharmacy and they will forward the refill request to us. Please allow 3 business days for your refill to be completed.          Additional Information About Your Visit        [employer] Information     [employer] gives you secure access to your electronic health record. If you see a primary care provider, you can also send messages to your care team and make appointments. If you have questions, please call your primary care clinic.  If you do not have a primary care provider, please call [phone] and they will assist you.        Care EveryWhere ID     This is your Care EveryWhere ID. This could be used by other organizations to access your [place] medical records  [id number]        Your Vitals Were     Pulse Temperature Pulse Oximetry BMI (Body Mass Index)          84 97.7  F (36.5  C) (Tympanic) 96% 22.25 kg/m2         Blood Pressure from Last 3 Encounters:   10/18/18 139/79   08/01/18 113/72   07/23/18 114/76    Weight from Last 3 Encounters:   10/18/18 62.1 kg (136 lb  12.8 oz)   07/23/18 60.9 kg (134 lb 4.8 oz)   01/22/18 59.4 kg (131 lb)              We Performed the Following     HC FLU VACCINE, INCREASED ANTIGEN, PRESV FREE     ORTHO  REFERRAL     OTOLARYNGOLOGY REFERRAL     Prostate spec antigen screen          Today's Medication Changes          These changes are accurate as of 10/18/18 10:49 AM.  If you have any questions, ask your nurse or doctor.               Start taking these medicines.        Dose/Directions    meclizine 25 MG tablet   Commonly known as:  ANTIVERT   Used for:  Vertigo   Started by:  Fidencio Cote MD        Dose:  25 mg   Take 1 tablet (25 mg) by mouth 3 times daily as needed for dizziness   Quantity:  60 tablet   Refills:  0       tamsulosin 0.4 MG capsule   Commonly known as:  FLOMAX   Used for:  Benign prostatic hyperplasia with weak urinary stream   Started by:  Fidencio Cote MD        Dose:  0.4 mg   Take 1 capsule (0.4 mg) by mouth daily for urine flow.   Quantity:  90 capsule   Refills:  1            Where to get your medicines      These medications were sent to [id number] - [place], [place] - [address] [address]     Phone:  [phone]     meclizine 25 MG tablet    tamsulosin 0.4 MG capsule                Primary Care Provider Office Phone # Fax #    Fidencio Cote [phone]       [address] [facility] [address]        Equal Access to Services     [facility] AH: Hadii aad ku hadasho Soomaali, waaxda luqadaha, qaybta kaalmada adeegyada, waxay mollyin haydebin chung lucia . So [facility] [phone].    ATENCIÓN: Si habla español, tiene a sims disposición servicios gratuitos de asistencia lingüística. Llame al [phone].    We comply with applicable federal civil rights laws and [place] laws. We do not discriminate on the basis of race, color, national origin, age, disability, sex, sexual orientation, or gender identity.            Thank  you!     Thank you for choosing [facility]  for your care. Our goal is always to provide you with excellent care. Hearing back from our patients is one way we can continue to improve our services. Please take a few minutes to complete the written survey that you may receive in the mail after your visit with us. Thank you!             Your Updated Medication List - Protect others around you: Learn how to safely use, store and throw away your medicines at www.disposemymeds.org.          This list is accurate as of 10/18/18 10:49 AM.  Always use your most recent med list.                   Brand Name Dispense Instructions for use Diagnosis    aspirin 81 MG tablet      Take 1 tablet by mouth daily *.        BIOFLEX PO      Take 1 capful by mouth 1 capsule every other day.        Blood Pressure Monitor Kit      As directed.        cholecalciferol 5000 units Caps capsule    vitamin D3     Take by mouth daily        FISH OIL      1 capsule every other day        lisinopril 2.5 MG tablet    PRINIVIL/Zestril    90 tablet    Take 1 tablet (2.5 mg) by mouth daily for kidneys in diabetes.    Type 2 diabetes mellitus without complication, without long-term current use of insulin (H)       meclizine 25 MG tablet    ANTIVERT    60 tablet    Take 1 tablet (25 mg) by mouth 3 times daily as needed for dizziness    Vertigo       MULTIVITAMIN PO      1 tab every other day        omeprazole 20 MG CR capsule    priLOSEC    90 capsule    Take 1 capsule (20 mg) by mouth daily as needed for reflux.    Gastroesophageal reflux disease without esophagitis       ONETOUCH VERIO IQ test strip   Generic drug:  blood glucose monitoring     100 each    use to test blood sugar 2 times daily or as directed    Type 2 diabetes mellitus without complication, without long-term current use of insulin (H)       pravastatin 40 MG tablet    PRAVACHOL    90 tablet    Take 1 tablet (40 mg) by mouth every evening for cholesterol.     Hyperlipidemia LDL goal <100       tamsulosin 0.4 MG capsule    FLOMAX    90 capsule    Take 1 capsule (0.4 mg) by mouth daily for urine flow.    Benign prostatic hyperplasia with weak urinary stream       traZODone 50 MG tablet    DESYREL    90 tablet    Take 0.5-1 tablets (25-50 mg) by mouth nightly as needed for sleep    Persistent disorder of initiating or maintaining sleep

## 2018-11-06 ENCOUNTER — OFFICE VISIT (OUTPATIENT)
Dept: ORTHOPEDICS | Facility: CLINIC | Age: 73
End: 2018-11-06
Payer: COMMERCIAL

## 2018-11-06 VITALS
OXYGEN SATURATION: 98 % | HEIGHT: 68 IN | RESPIRATION RATE: 13 BRPM | DIASTOLIC BLOOD PRESSURE: 95 MMHG | BODY MASS INDEX: 20.46 KG/M2 | WEIGHT: 135 LBS | SYSTOLIC BLOOD PRESSURE: 137 MMHG | HEART RATE: 68 BPM

## 2018-11-06 DIAGNOSIS — M25.511 RIGHT SHOULDER PAIN, UNSPECIFIED CHRONICITY: Primary | ICD-10-CM

## 2018-11-06 DIAGNOSIS — M75.41 IMPINGEMENT SYNDROME OF RIGHT SHOULDER: ICD-10-CM

## 2018-11-06 PROCEDURE — 99213 OFFICE O/P EST LOW 20 MIN: CPT | Performed by: ORTHOPAEDIC SURGERY

## 2018-11-06 NOTE — LETTER
11/6/2018         RE: Stephon Bryant  [address]  [facility] [id number]        Dear Colleague,    Thank you for referring your patient, Stephon Bryant, to the [facility]. Please see a copy of my visit note below.    Stephon Bryant is a 73 year old male who is seen in consultation at the request of Dr. Fidencio Cote  for right shoulder pain and left elbow pain.  He has had pain at the posterior aspect of the right shoulder since pushing a wheelbarrow about 40 days ago. This was at home. He has difficulty twisting his arm behind him on the right. He has shooting pain rated 5 out of 10. He has some difficulty raising the arm up in front.  He has also noticed some swelling at the anterior aspect of the left elbow when he fully straightens.  He also noted some pain deep posterior aspect of the left knee for the last week or 2. This started after a injury where a grandchild ran into his knee.    Prior x-rays of both shoulders, 6/15/16 showed no osteoarthritis.    History reviewed. No pertinent past medical history.    Past Surgical History:   Procedure Laterality Date     COLONOSCOPY       COLONOSCOPY  12/18/2013    Procedure: COMBINED COLONOSCOPY, SINGLE BIOPSY/POLYPECTOMY BY BIOPSY;  Cecal polyp-X 1, removed with cold biopsy forceps.;  Surgeon: Duane, William Charles, MD;  Location:  OR     ESOPHAGOSCOPY, GASTROSCOPY, DUODENOSCOPY (EGD), COMBINED  12/18/2013    Procedure: COMBINED ESOPHAGOSCOPY, GASTROSCOPY, DUODENOSCOPY (EGD), BIOPSY SINGLE OR MULTIPLE;  Duodenum BX were taken with cold biopsy forceps to evaluate for spru.;  Surgeon: Duane, William Charles, MD;  Location:  OR     PROSTATE BIOPSY, NEEDLE, SATURATION SAMPLING  pre-2005    [facility] for elevated PSA       Family History   Problem Relation Age of Onset     HEART DISEASE Father      Diabetes Sister      Cancer Brother 64     lymphoma?     Breast Cancer Sister      Prostate Cancer No family hx of      unk      Hypertension No family hx of      Cerebrovascular Disease No family hx of        Social History     Social History     Marital status:      Spouse name: N/A     Number of children: 6     Years of education: N/A     Occupational History     Not on file.     Social History Main Topics     Smoking status: Former Smoker     Packs/day: 0.30     Years: 7.00     Types: Cigarettes     Smokeless tobacco: Never Used      Comment: [id number] (in service)     Alcohol use No     Drug use: No     Sexual activity: Not Currently     Partners: Female     Birth control/ protection: None     Other Topics Concern     Not on file     Social History Narrative       Current Outpatient Prescriptions   Medication Sig Dispense Refill     aspirin 81 MG tablet Take 1 tablet by mouth daily *.        Bioflavonoid Products (BIOFLEX PO) Take 1 capful by mouth 1 capsule every other day.       Blood Pressure Monitor KIT As directed.       cholecalciferol (VITAMIN D3) 5000 UNITS CAPS capsule Take by mouth daily       FISH OIL 1 capsule every other day       lisinopril (PRINIVIL/ZESTRIL) 2.5 MG tablet Take 1 tablet (2.5 mg) by mouth daily for kidneys in diabetes. 90 tablet 3     meclizine (ANTIVERT) 25 MG tablet Take 1 tablet (25 mg) by mouth 3 times daily as needed for dizziness 60 tablet 0     Multiple Vitamins-Minerals (MULTIVITAMIN OR) 1 tab every other day       omeprazole (PRILOSEC) 20 MG CR capsule Take 1 capsule (20 mg) by mouth daily as needed for reflux. 90 capsule 1     ONETOUCH VERIO IQ test strip use to test blood sugar 2 times daily or as directed 100 each 2     pravastatin (PRAVACHOL) 40 MG tablet Take 1 tablet (40 mg) by mouth every evening for cholesterol. 90 tablet 1     tamsulosin (FLOMAX) 0.4 MG capsule Take 1 capsule (0.4 mg) by mouth daily for urine flow. 90 capsule 1     traZODone (DESYREL) 50 MG tablet Take 0.5-1 tablets (25-50 mg) by mouth nightly as needed for sleep 90 tablet 1       Allergies   Allergen Reactions      "No Clinical Screening - See Comments      No latex allergy     Penicillins Other (See Comments)     PN: rash     Proanthocyanidin      PN: face swell, rash hands     Shellfish-Derived Products      PN: shrimp, face swelling       REVIEW OF SYSTEMS:  CONSTITUTIONAL:  NEGATIVE for fever, chills, change in weight, not feeling tired  SKIN:  NEGATIVE for worrisome rashes, no skin lumps, no skin ulcers and no non-healing wounds  EYES:  NEGATIVE for vision changes or irritation.  ENT/MOUTH:  NEGATIVE.  No hearing loss, no hoarseness, no difficulty swallowing.  RESP:  NEGATIVE. No cough or shortness of breath.  CV:  NEGATIVE for chest pain, palpitations or peripheral edema  GI:  NEGATIVE for nausea, abdominal pain, heartburn, or change in bowel habits  :  Negative. No dysuria, no hematuria  MUSCULOSKELETAL:  See HPI above  NEURO:  NEGATIVE . No headaches, no dizziness,  no numbness  ENDOCRINE:  NEGATIVE for temperature intolerance, skin/hair changes  HEME/ALLERGY/IMMUNE:  NEGATIVE for bleeding problems  PSYCHIATRIC:  NEGATIVE. no anxiety, no depression.     Exam:  Vitals: BP (!) 137/95  Pulse 68  Resp 13  Ht 1.721 m (5' 7.75\")  Wt 61.2 kg (135 lb)  SpO2 98%  BMI 20.68 kg/m2  BMI= Body mass index is 20.68 kg/(m^2).  Constitutional:  healthy, alert and no distress  Neuro: Alert and Oriented x 3, Sensation grossly WNL.  Psych: Affect normal   Respiratory: Breathing not labored.  Cardiovascular: normal peripheral pulses  Lymph: no adenopathy  Skin: No rashes,worrisome lesions or skin problems  Shoulders show full range of motion in flexion on both sides. He has mild limitation of internal rotation on both. On the right this is painful. He has full external rotation without pain.  He had no pain with resisted internal rotation, external rotation, abduction.  He had negative empty can sign. No pain with resisted abduction at 90 .  He does have positive Wagner sign on the right, negative on the left.  There is no " tenderness in the subacromial space or acromioclavicular joints. He did have discomfort at the posterior aspect of the right shoulder  Elbow show full range of motion. There appears to be normal soft tissue at the anterior aspect of both elbows. It might be slightly more on the left but does not appear pathologic.  Sensation, motor and circulation are intact.    Assessment:  Right shoulder impingement.  Plan;  Rotator cuff strengthening with Physical Therapy.  NSAID as needed.  Consider injection if pain persists.    Again, thank you for allowing me to participate in the care of your patient.        Sincerely,        Joseph Dong MD

## 2018-11-06 NOTE — PROGRESS NOTES
Stephon Bryant is a 73 year old male who is seen in consultation at the request of Dr. Fidencio Cote  for right shoulder pain and left elbow pain.  He has had pain at the posterior aspect of the right shoulder since pushing a wheelbarrow about 40 days ago. This was at home. He has difficulty twisting his arm behind him on the right. He has shooting pain rated 5 out of 10. He has some difficulty raising the arm up in front.  He has also noticed some swelling at the anterior aspect of the left elbow when he fully straightens.  He also noted some pain deep posterior aspect of the left knee for the last week or 2. This started after a injury where a grandchild ran into his knee.    Prior x-rays of both shoulders, 6/15/16 showed no osteoarthritis.    History reviewed. No pertinent past medical history.    Past Surgical History:   Procedure Laterality Date     COLONOSCOPY       COLONOSCOPY  12/18/2013    Procedure: COMBINED COLONOSCOPY, SINGLE BIOPSY/POLYPECTOMY BY BIOPSY;  Cecal polyp-X 1, removed with cold biopsy forceps.;  Surgeon: Duane, William Charles, MD;  Location:  OR     ESOPHAGOSCOPY, GASTROSCOPY, DUODENOSCOPY (EGD), COMBINED  12/18/2013    Procedure: COMBINED ESOPHAGOSCOPY, GASTROSCOPY, DUODENOSCOPY (EGD), BIOPSY SINGLE OR MULTIPLE;  Duodenum BX were taken with cold biopsy forceps to evaluate for spru.;  Surgeon: Duane, William Charles, MD;  Location:  OR     PROSTATE BIOPSY, NEEDLE, SATURATION SAMPLING  pre-2005    [facility] for elevated PSA       Family History   Problem Relation Age of Onset     HEART DISEASE Father      Diabetes Sister      Cancer Brother 64     lymphoma?     Breast Cancer Sister      Prostate Cancer No family hx of      unk     Hypertension No family hx of      Cerebrovascular Disease No family hx of        Social History     Social History     Marital status:      Spouse name: N/A     Number of children: 6     Years of education: N/A     Occupational History     Not on  file.     Social History Main Topics     Smoking status: Former Smoker     Packs/day: 0.30     Years: 7.00     Types: Cigarettes     Smokeless tobacco: Never Used      Comment: [id number] (in service)     Alcohol use No     Drug use: No     Sexual activity: Not Currently     Partners: Female     Birth control/ protection: None     Other Topics Concern     Not on file     Social History Narrative       Current Outpatient Prescriptions   Medication Sig Dispense Refill     aspirin 81 MG tablet Take 1 tablet by mouth daily *.        Bioflavonoid Products (BIOFLEX PO) Take 1 capful by mouth 1 capsule every other day.       Blood Pressure Monitor KIT As directed.       cholecalciferol (VITAMIN D3) 5000 UNITS CAPS capsule Take by mouth daily       FISH OIL 1 capsule every other day       lisinopril (PRINIVIL/ZESTRIL) 2.5 MG tablet Take 1 tablet (2.5 mg) by mouth daily for kidneys in diabetes. 90 tablet 3     meclizine (ANTIVERT) 25 MG tablet Take 1 tablet (25 mg) by mouth 3 times daily as needed for dizziness 60 tablet 0     Multiple Vitamins-Minerals (MULTIVITAMIN OR) 1 tab every other day       omeprazole (PRILOSEC) 20 MG CR capsule Take 1 capsule (20 mg) by mouth daily as needed for reflux. 90 capsule 1     ONETOUCH VERIO IQ test strip use to test blood sugar 2 times daily or as directed 100 each 2     pravastatin (PRAVACHOL) 40 MG tablet Take 1 tablet (40 mg) by mouth every evening for cholesterol. 90 tablet 1     tamsulosin (FLOMAX) 0.4 MG capsule Take 1 capsule (0.4 mg) by mouth daily for urine flow. 90 capsule 1     traZODone (DESYREL) 50 MG tablet Take 0.5-1 tablets (25-50 mg) by mouth nightly as needed for sleep 90 tablet 1       Allergies   Allergen Reactions     No Clinical Screening - See Comments      No latex allergy     Penicillins Other (See Comments)     PN: rash     Proanthocyanidin      PN: face swell, rash hands     Shellfish-Derived Products      PN: shrimp, face swelling       REVIEW OF  "SYSTEMS:  CONSTITUTIONAL:  NEGATIVE for fever, chills, change in weight, not feeling tired  SKIN:  NEGATIVE for worrisome rashes, no skin lumps, no skin ulcers and no non-healing wounds  EYES:  NEGATIVE for vision changes or irritation.  ENT/MOUTH:  NEGATIVE.  No hearing loss, no hoarseness, no difficulty swallowing.  RESP:  NEGATIVE. No cough or shortness of breath.  CV:  NEGATIVE for chest pain, palpitations or peripheral edema  GI:  NEGATIVE for nausea, abdominal pain, heartburn, or change in bowel habits  :  Negative. No dysuria, no hematuria  MUSCULOSKELETAL:  See HPI above  NEURO:  NEGATIVE . No headaches, no dizziness,  no numbness  ENDOCRINE:  NEGATIVE for temperature intolerance, skin/hair changes  HEME/ALLERGY/IMMUNE:  NEGATIVE for bleeding problems  PSYCHIATRIC:  NEGATIVE. no anxiety, no depression.     Exam:  Vitals: BP (!) 137/95  Pulse 68  Resp 13  Ht 1.721 m (5' 7.75\")  Wt 61.2 kg (135 lb)  SpO2 98%  BMI 20.68 kg/m2  BMI= Body mass index is 20.68 kg/(m^2).  Constitutional:  healthy, alert and no distress  Neuro: Alert and Oriented x 3, Sensation grossly WNL.  Psych: Affect normal   Respiratory: Breathing not labored.  Cardiovascular: normal peripheral pulses  Lymph: no adenopathy  Skin: No rashes,worrisome lesions or skin problems  Shoulders show full range of motion in flexion on both sides. He has mild limitation of internal rotation on both. On the right this is painful. He has full external rotation without pain.  He had no pain with resisted internal rotation, external rotation, abduction.  He had negative empty can sign. No pain with resisted abduction at 90 .  He does have positive Wagner sign on the right, negative on the left.  There is no tenderness in the subacromial space or acromioclavicular joints. He did have discomfort at the posterior aspect of the right shoulder  Elbow show full range of motion. There appears to be normal soft tissue at the anterior aspect of both elbows. It " might be slightly more on the left but does not appear pathologic.  Sensation, motor and circulation are intact.    Assessment:  Right shoulder impingement.  Plan;  Rotator cuff strengthening with Physical Therapy.  NSAID as needed.  Consider injection if pain persists.

## 2018-11-06 NOTE — PATIENT INSTRUCTIONS
Patient to follow up with Primary Care provider regarding elevated blood pressure.    Get elastic tubing.  External rotation.  Internal rotation.  Lift to 90 degrees to the side and front with weight. (Scaption)  Shoulder shrug and roll with weights.  Seated row, pull shoulder blades together.  (Horizontal abduction).  Pushup.                                     Rotator Cuff Injury   What is a rotator cuff injury?   A rotator cuff injury is a strain or tear in the group of tendons and muscles that hold your shoulder joint together and help move your shoulder.   How does it occur?   A rotator cuff injury may result from:     using your arm to break a fall     falling onto your arm     lifting a heavy object     use of your shoulder in sports with a repetitive overhead movement, such as swimming, baseball (mainly pitchers), football, and tennis, which gradually strains the tendon     manual labor such as painting, plastering, raking leaves, or housework   What are the symptoms?   The symptoms of a torn rotator cuff are:     arm and shoulder pain     shoulder weakness     shoulder tenderness     loss of shoulder movement, especially overhead   How is it diagnosed?   Your healthcare provider will examine you and check your shoulder for pain, tenderness, and loss of motion as you move your arm in all directions. Your provider will ask if your shoulder pain began suddenly or gradually. You may have an X-ray to make sure there are not any fractures or bone spurs.   Based on these results, you may have other tests or procedures right away or later, such as:     magnetic resonance imaging (MRI), which creates images of your shoulder and surrounding structures with sound waves     an arthrogram, which is an X-ray or MRI that is taken after a special dye has been injected into your shoulder joint to outline its soft structures     arthroscopy, a surgical procedure in which a small instrument is inserted into your shoulder  joint so your provider can look directly at your rotator cuff   What is the treatment?   A tendon in your shoulder can be inflamed, partially torn, or completely torn. What is done about it depends on how torn it is and how much it hurts.   If your tear is a minor one, it can be left to heal by itself if it does not interfere with your everyday activities. Your treatment plan should include:     proper sitting posture, in which your head and shoulders are balanced     rest for your shoulder, which means avoiding strenuous activity or any overhead motion that causes pain     ice packs at least once a day, and preferably 2 or 3 times a day     doing the exercises your healthcare provider gives you     anti-inflammatory drugs. Adults aged 65 years and older should not take non-steroidal anti-inflammatory medicine for more than 7 days without their healthcare provider's approval.     physical therapy to strengthen your shoulder as it heals   If you have a bad tear, you may need to have it repaired by arthroscopy. Arthroscopy can be used to perform surgery on a joint as well as to see inside the joint. The rough edges of a torn tendon can be trimmed and left to heal. Larger tears can be stitched back together. After surgery, your treatment plan will include physical therapy to strengthen your shoulder as it heals.   How long will the effects last?   Full recovery depends on what is torn and how it is treated.   When can I return to my normal activities?   Everyone recovers from an injury at a different rate. Return to your activities will be determined by how soon your shoulder recovers, not by how many days or weeks it has been since your injury has occurred. In general, the longer you have symptoms before you start treatment, the longer it will take to get better. The goal of rehabilitation is to return you to your normal activities as soon as is safely possible. If you return too soon you may worsen your injury.   You  may safely return to your normal activities when:     Your injured shoulder has full range of motion without pain.     Your injured shoulder has regained normal strength compared to the uninjured shoulder.   What can be done to help prevent this from recurring?   The best way to prevent a recurrence is to strengthen your shoulder muscles and keep them in peak condition with shoulder exercises.           Rotator Cuff Strain Rehabilitation Exercises                You may do all of these exercises right away.   Isometric shoulder external rotation:  a doorway with your elbow bent 90 degrees and the back of the wrist on your injured side pressed against the door frame. Try to press your hand outward into the door frame. Hold for 5 seconds. Do 3 sets of 10.   Isometric shoulder internal rotation:  a doorway with your elbow bent 90 degrees and the front of the wrist on your injured side pressed against the door frame. Try to press your palm into the door frame. Hold for 5 seconds. Do 3 sets of 10.   Wand exercise: Flexion: Stand upright and hold a stick in both hands, palms down. Stretch your arms by lifting them over your head, keeping your arms straight. Hold for 5 seconds and return to the starting position. Repeat 10 times.   Wand exercise: Extension: Stand upright and hold a stick in both hands behind your back. Move the stick away from your back. Hold the end position for 5 seconds. Relax and return to the starting position. Repeat 10 times.   Wand exercise: External rotation: Lie on your back and hold a stick in both hands, palms up. Your upper arms should be resting on the floor with your elbows at your sides and bent 90 degrees. Use your uninjured arm to push your injured arm out away from your body. Keep the elbow of your injured arm at your side while it is being pushed. Hold the stretch for 5 seconds. Repeat 10 times.   Wand exercise: Shoulder abduction and adduction: Stand and hold a stick  with both hands, palms facing away from your body. Rest the stick against the front of your thighs. Use your uninjured arm to push your injured arm out to the side and up as high as possible. Keep your arms straight. Hold for 5 seconds. Repeat 10 times.   Resisted shoulder external rotation: Stand sideways next to a door with your injured arm farther from the door. Tie a knot in the end of the tubing and shut the knot in the door at waist level. Hold the other end of the tubing with the hand of your injured arm. Rest the hand of your injured arm across your stomach. Keeping your elbow in at your side, rotate your arm outward and away from your waist. Make sure you keep your elbow bent 90 degrees and your forearm parallel to the floor. Repeat 10 times. Build up to 3 sets of 10.   Resisted shoulder internal rotation: Stand sideways next to a door with your injured arm closest to the door. Tie a knot in the end of the tubing and shut the knot in the door at waist level. Hold the other end of the tubing with the hand of your injured arm. Bend the elbow of your injured arm 90 degrees. Keeping your elbow in at your side, rotate your forearm across your body and then back to the starting position. Make sure you keep your forearm parallel to the floor. Do 3 sets of 10.   Scaption: Stand with your arms at your sides and with your elbows straight. Slowly raise your arms to eye level. As you raise your arms, spread them apart so that they are only slightly in front of your body (at about a 30-degree angle to the front of your body). Point your thumbs toward the ceiling. Hold for 2 seconds and lower your arms slowly. Do 3 sets of 10. Progress to holding a soup can or light weight when you are doing the exercise and increase the weight as the exercise gets easier.   Side-lying external rotation: Lie on your uninjured side with your injured arm at your side and your elbow bent 90 degrees. Keeping your elbow against your side,  "raise your forearm toward the ceiling and hold for 2 seconds. Slowly lower your arm. Do 3 sets of 10. You can start doing this exercise holding a soup can or light weight and gradually increase the weight as long as there is no pain.   Horizontal abduction: Lie on your stomach on a table or the edge of a bed with the arm on your injured side hanging down over the edge. Raise your arm out to the side, with your thumb pointed toward the ceiling, until your arm is parallel to the floor. Hold for 2 seconds and then lower it slowly. Start this exercise with no weight. As you get stronger, add a light weight or hold a soup can. Do 3 sets of 10.   Push-up with a plus: Begin on the floor on your hands and knees. Keep your arms a shoulder width apart and lift your feet off the floor. Arch your back as high as possible and round your shoulders (this is the \"plus\" part or the exercise). Bend your elbows and lower your body to the floor. Return to the starting position and arch your back again. Do 3 sets of 10.   Published by [employer].  This content is reviewed periodically and is subject to change as new health information becomes available. The information is intended to inform and educate and is not a replacement for medical evaluation, advice, diagnosis or treatment by a healthcare professional.   Written by Genna Zazueta MS, PT, and Tisha Canela PT, [facility], [facility], for [employer].   ? 2010 [facility] and/or its affiliates. All Rights Reserved.   Copyright   [employer] 2011  [employer]ult [employer]visor                  "

## 2018-11-06 NOTE — MR AVS SNAPSHOT
After Visit Summary   11/6/2018    Stephon Bryant    MRN: [id number]           Patient Information     Date Of Birth          1945        Visit Information        Provider Department      11/6/2018 10:45 AM Joseph Dong MD [facility]        Today's Diagnoses     Right shoulder pain, unspecified chronicity    -  1      Care Instructions    Patient to follow up with Primary Care provider regarding elevated blood pressure.    Get elastic tubing.  External rotation.  Internal rotation.  Lift to 90 degrees to the side and front with weight. (Scaption)  Shoulder shrug and roll with weights.  Seated row, pull shoulder blades together.  (Horizontal abduction).  Pushup.                                     Rotator Cuff Injury   What is a rotator cuff injury?   A rotator cuff injury is a strain or tear in the group of tendons and muscles that hold your shoulder joint together and help move your shoulder.   How does it occur?   A rotator cuff injury may result from:     using your arm to break a fall     falling onto your arm     lifting a heavy object     use of your shoulder in sports with a repetitive overhead movement, such as swimming, baseball (mainly pitchers), football, and tennis, which gradually strains the tendon     manual labor such as painting, plastering, raking leaves, or housework   What are the symptoms?   The symptoms of a torn rotator cuff are:     arm and shoulder pain     shoulder weakness     shoulder tenderness     loss of shoulder movement, especially overhead   How is it diagnosed?   Your healthcare provider will examine you and check your shoulder for pain, tenderness, and loss of motion as you move your arm in all directions. Your provider will ask if your shoulder pain began suddenly or gradually. You may have an X-ray to make sure there are not any fractures or bone spurs.   Based on these results, you may have other tests or procedures right away or  later, such as:     magnetic resonance imaging (MRI), which creates images of your shoulder and surrounding structures with sound waves     an arthrogram, which is an X-ray or MRI that is taken after a special dye has been injected into your shoulder joint to outline its soft structures     arthroscopy, a surgical procedure in which a small instrument is inserted into your shoulder joint so your provider can look directly at your rotator cuff   What is the treatment?   A tendon in your shoulder can be inflamed, partially torn, or completely torn. What is done about it depends on how torn it is and how much it hurts.   If your tear is a minor one, it can be left to heal by itself if it does not interfere with your everyday activities. Your treatment plan should include:     proper sitting posture, in which your head and shoulders are balanced     rest for your shoulder, which means avoiding strenuous activity or any overhead motion that causes pain     ice packs at least once a day, and preferably 2 or 3 times a day     doing the exercises your healthcare provider gives you     anti-inflammatory drugs. Adults aged 65 years and older should not take non-steroidal anti-inflammatory medicine for more than 7 days without their healthcare provider's approval.     physical therapy to strengthen your shoulder as it heals   If you have a bad tear, you may need to have it repaired by arthroscopy. Arthroscopy can be used to perform surgery on a joint as well as to see inside the joint. The rough edges of a torn tendon can be trimmed and left to heal. Larger tears can be stitched back together. After surgery, your treatment plan will include physical therapy to strengthen your shoulder as it heals.   How long will the effects last?   Full recovery depends on what is torn and how it is treated.   When can I return to my normal activities?   Everyone recovers from an injury at a different rate. Return to your activities will be  determined by how soon your shoulder recovers, not by how many days or weeks it has been since your injury has occurred. In general, the longer you have symptoms before you start treatment, the longer it will take to get better. The goal of rehabilitation is to return you to your normal activities as soon as is safely possible. If you return too soon you may worsen your injury.   You may safely return to your normal activities when:     Your injured shoulder has full range of motion without pain.     Your injured shoulder has regained normal strength compared to the uninjured shoulder.   What can be done to help prevent this from recurring?   The best way to prevent a recurrence is to strengthen your shoulder muscles and keep them in peak condition with shoulder exercises.           Rotator Cuff Strain Rehabilitation Exercises                You may do all of these exercises right away.   Isometric shoulder external rotation:  a doorway with your elbow bent 90 degrees and the back of the wrist on your injured side pressed against the door frame. Try to press your hand outward into the door frame. Hold for 5 seconds. Do 3 sets of 10.   Isometric shoulder internal rotation:  a doorway with your elbow bent 90 degrees and the front of the wrist on your injured side pressed against the door frame. Try to press your palm into the door frame. Hold for 5 seconds. Do 3 sets of 10.   Wand exercise: Flexion: Stand upright and hold a stick in both hands, palms down. Stretch your arms by lifting them over your head, keeping your arms straight. Hold for 5 seconds and return to the starting position. Repeat 10 times.   Wand exercise: Extension: Stand upright and hold a stick in both hands behind your back. Move the stick away from your back. Hold the end position for 5 seconds. Relax and return to the starting position. Repeat 10 times.   Wand exercise: External rotation: Lie on your back and hold a stick in both  hands, palms up. Your upper arms should be resting on the floor with your elbows at your sides and bent 90 degrees. Use your uninjured arm to push your injured arm out away from your body. Keep the elbow of your injured arm at your side while it is being pushed. Hold the stretch for 5 seconds. Repeat 10 times.   Wand exercise: Shoulder abduction and adduction: Stand and hold a stick with both hands, palms facing away from your body. Rest the stick against the front of your thighs. Use your uninjured arm to push your injured arm out to the side and up as high as possible. Keep your arms straight. Hold for 5 seconds. Repeat 10 times.   Resisted shoulder external rotation: Stand sideways next to a door with your injured arm farther from the door. Tie a knot in the end of the tubing and shut the knot in the door at waist level. Hold the other end of the tubing with the hand of your injured arm. Rest the hand of your injured arm across your stomach. Keeping your elbow in at your side, rotate your arm outward and away from your waist. Make sure you keep your elbow bent 90 degrees and your forearm parallel to the floor. Repeat 10 times. Build up to 3 sets of 10.   Resisted shoulder internal rotation: Stand sideways next to a door with your injured arm closest to the door. Tie a knot in the end of the tubing and shut the knot in the door at waist level. Hold the other end of the tubing with the hand of your injured arm. Bend the elbow of your injured arm 90 degrees. Keeping your elbow in at your side, rotate your forearm across your body and then back to the starting position. Make sure you keep your forearm parallel to the floor. Do 3 sets of 10.   Scaption: Stand with your arms at your sides and with your elbows straight. Slowly raise your arms to eye level. As you raise your arms, spread them apart so that they are only slightly in front of your body (at about a 30-degree angle to the front of your body). Point your  "thumbs toward the ceiling. Hold for 2 seconds and lower your arms slowly. Do 3 sets of 10. Progress to holding a soup can or light weight when you are doing the exercise and increase the weight as the exercise gets easier.   Side-lying external rotation: Lie on your uninjured side with your injured arm at your side and your elbow bent 90 degrees. Keeping your elbow against your side, raise your forearm toward the ceiling and hold for 2 seconds. Slowly lower your arm. Do 3 sets of 10. You can start doing this exercise holding a soup can or light weight and gradually increase the weight as long as there is no pain.   Horizontal abduction: Lie on your stomach on a table or the edge of a bed with the arm on your injured side hanging down over the edge. Raise your arm out to the side, with your thumb pointed toward the ceiling, until your arm is parallel to the floor. Hold for 2 seconds and then lower it slowly. Start this exercise with no weight. As you get stronger, add a light weight or hold a soup can. Do 3 sets of 10.   Push-up with a plus: Begin on the floor on your hands and knees. Keep your arms a shoulder width apart and lift your feet off the floor. Arch your back as high as possible and round your shoulders (this is the \"plus\" part or the exercise). Bend your elbows and lower your body to the floor. Return to the starting position and arch your back again. Do 3 sets of 10.   Published by [employer].  This content is reviewed periodically and is subject to change as new health information becomes available. The information is intended to inform and educate and is not a replacement for medical evaluation, advice, diagnosis or treatment by a healthcare professional.   Written by Genna Zazueta, MS, PT, and Tisha Canela, PT, [facility], [facility], for [employer].   ? 2010 [facility] and/or its affiliates. All Rights Reserved.   Copyright   [employer] Reference Systems 2011  Adult Health Advisor                          " Follow-ups after your visit        Additional Services     [facility] PT, HAND, AND CHIROPRACTIC REFERRAL       Physical Therapy, Hand Therapy and Chiropractic Care are available through:  *[facility]  *Hand Therapy (Occupational Therapy or Physical Therapy)  *[facility]    Call one number to schedule at any of the above locations: [phone].    Instructions: Rotator cuff strengthening with tubing and scapular stabilization.    1-2 visits  RTC on as needed basis    Physical therapy, Hand therapy and/or Chiropractic care has been recommended by your physician as an excellent treatment option to reduce pain and help people return to normal activities, including sports.  Therapy and/or chiropractic care services are a great complement or alternative to expensive and invasive surgery, injections, or long-term use of prescription medications. The primary goal is to identify the underlying problem and provide you the tools to manage your condition on your own.     Please be aware that coverage of these services is subject to the terms and limitations of your health insurance plan.  Call member services at your health plan with any benefit or coverage questions.      Please bring the following to your appointment:  *Your personal calendar for scheduling future appointments  *Comfortable clothing                  Future tests that were ordered for you today     Open Future Orders        Priority Expected Expires Ordered    [facility] PT, HAND, AND CHIROPRACTIC REFERRAL Routine  11/6/2019 11/6/2018            Who to contact     If you have questions or need follow up information about today's clinic visit or your schedule please contact [facility] directly at [phone].  Normal or non-critical lab and imaging results will be communicated to you by MyChart, letter or phone within 4 business days after the clinic has received the results. If you do not hear from us  "within 7 days, please contact the clinic through [employer] [facility] or phone. If you have a critical or abnormal lab result, we will notify you by phone as soon as possible.  Submit refill requests through [employer] or call your pharmacy and they will forward the refill request to us. Please allow 3 business days for your refill to be completed.          Additional Information About Your Visit        [email or web address] Information     [facility] [employer] [facility] gives you secure access to your electronic health record. If you see a primary care provider, you can also send messages to your care team and make appointments. If you have questions, please call your primary care clinic.  If you do not have a primary care provider, please call [phone] and they will assist you.        Care EveryWhere ID     This is your Care EveryWhere ID. This could be used by other organizations to access your [facility] medical records  [id number]        Your Vitals Were     Pulse Respirations Height Pulse Oximetry BMI (Body Mass Index)       68 13 1.721 m (5' 7.75\") 98% 20.68 kg/m2        Blood Pressure from Last 3 Encounters:   11/06/18 (!) 137/95   10/18/18 139/79   08/01/18 113/72    Weight from Last 3 Encounters:   11/06/18 61.2 kg (135 lb)   10/18/18 62.1 kg (136 lb 12.8 oz)   07/23/18 60.9 kg (134 lb 4.8 oz)               Primary Care Provider Office Phone # Fax #    Fidencio Cote [phone]       [address] [facility] [address]        Equal Access to Services     [facility] : Hadii aad ku hadasho Soomaali, waaxda luqadaha, qaybta kaalmada adeegyada, julia gutierrez. So [facility] [phone].    ATENCIÓN: Si habla español, tiene a sims disposición servicios gratuitos de asistencia lingüística. Llame al [phone].    We comply with applicable federal civil rights laws and [place] laws. We do not discriminate on the basis of race, color, national origin, age, disability, sex, sexual orientation, or gender " identity.            Thank you!     Thank you for choosing [facility]  for your care. Our goal is always to provide you with excellent care. Hearing back from our patients is one way we can continue to improve our services. Please take a few minutes to complete the written survey that you may receive in the mail after your visit with us. Thank you!             Your Updated Medication List - Protect others around you: Learn how to safely use, store and throw away your medicines at www.disposemymeds.org.          This list is accurate as of 11/6/18 11:05 AM.  Always use your most recent med list.                   Brand Name Dispense Instructions for use Diagnosis    aspirin 81 MG tablet      Take 1 tablet by mouth daily *.        BIOFLEX PO      Take 1 capful by mouth 1 capsule every other day.        Blood Pressure Monitor Kit      As directed.        cholecalciferol 5000 units Caps capsule    vitamin D3     Take by mouth daily        FISH OIL      1 capsule every other day        lisinopril 2.5 MG tablet    PRINIVIL/Zestril    90 tablet    Take 1 tablet (2.5 mg) by mouth daily for kidneys in diabetes.    Type 2 diabetes mellitus without complication, without long-term current use of insulin (H)       meclizine 25 MG tablet    ANTIVERT    60 tablet    Take 1 tablet (25 mg) by mouth 3 times daily as needed for dizziness    Vertigo       MULTIVITAMIN PO      1 tab every other day        omeprazole 20 MG CR capsule    priLOSEC    90 capsule    Take 1 capsule (20 mg) by mouth daily as needed for reflux.    Gastroesophageal reflux disease without esophagitis       ONETOUCH VERIO IQ test strip   Generic drug:  blood glucose monitoring     100 each    use to test blood sugar 2 times daily or as directed    Type 2 diabetes mellitus without complication, without long-term current use of insulin (H)       pravastatin 40 MG tablet    PRAVACHOL    90 tablet    Take 1 tablet (40 mg) by mouth every evening for  cholesterol.    Hyperlipidemia LDL goal <100       tamsulosin 0.4 MG capsule    FLOMAX    90 capsule    Take 1 capsule (0.4 mg) by mouth daily for urine flow.    Benign prostatic hyperplasia with weak urinary stream       traZODone 50 MG tablet    DESYREL    90 tablet    Take 0.5-1 tablets (25-50 mg) by mouth nightly as needed for sleep    Persistent disorder of initiating or maintaining sleep

## 2018-11-16 ENCOUNTER — TELEPHONE (OUTPATIENT)
Dept: FAMILY MEDICINE | Facility: CLINIC | Age: 73
End: 2018-11-16

## 2018-11-16 ENCOUNTER — OFFICE VISIT (OUTPATIENT)
Dept: FAMILY MEDICINE | Facility: CLINIC | Age: 73
End: 2018-11-16
Payer: COMMERCIAL

## 2018-11-16 VITALS
WEIGHT: 141 LBS | DIASTOLIC BLOOD PRESSURE: 68 MMHG | SYSTOLIC BLOOD PRESSURE: 132 MMHG | TEMPERATURE: 97.6 F | RESPIRATION RATE: 20 BRPM | OXYGEN SATURATION: 99 % | HEIGHT: 68 IN | HEART RATE: 69 BPM | BODY MASS INDEX: 21.37 KG/M2

## 2018-11-16 DIAGNOSIS — Z71.84 TRAVEL ADVICE ENCOUNTER: ICD-10-CM

## 2018-11-16 DIAGNOSIS — H61.21 IMPACTED CERUMEN OF RIGHT EAR: ICD-10-CM

## 2018-11-16 DIAGNOSIS — R42 VERTIGO: Primary | ICD-10-CM

## 2018-11-16 DIAGNOSIS — H60.501 ACUTE OTITIS EXTERNA OF RIGHT EAR, UNSPECIFIED TYPE: ICD-10-CM

## 2018-11-16 PROCEDURE — 69210 REMOVE IMPACTED EAR WAX UNI: CPT | Mod: RT | Performed by: FAMILY MEDICINE

## 2018-11-16 PROCEDURE — 99214 OFFICE O/P EST MOD 30 MIN: CPT | Mod: 25 | Performed by: FAMILY MEDICINE

## 2018-11-16 RX ORDER — OFLOXACIN 3 MG/ML
10 SOLUTION AURICULAR (OTIC) 2 TIMES DAILY
Qty: 10 ML | Refills: 0 | Status: SHIPPED | OUTPATIENT
Start: 2018-11-16 | End: 2018-11-16

## 2018-11-16 RX ORDER — OFLOXACIN 3 MG/ML
5 SOLUTION/ DROPS OPHTHALMIC 4 TIMES DAILY
Qty: 10 ML | Refills: 0 | Status: SHIPPED | OUTPATIENT
Start: 2018-11-16 | End: 2019-02-26

## 2018-11-16 RX ORDER — NEOMYCIN SULFATE, POLYMYXIN B SULFATE, HYDROCORTISONE 3.5; 10000; 1 MG/ML; [USP'U]/ML; MG/ML
4 SOLUTION/ DROPS AURICULAR (OTIC) 4 TIMES DAILY
Qty: 7.5 ML | Refills: 0 | Status: SHIPPED | OUTPATIENT
Start: 2018-11-16 | End: 2018-11-16

## 2018-11-16 RX ORDER — AZITHROMYCIN 250 MG/1
TABLET, FILM COATED ORAL
Qty: 6 TABLET | Refills: 0 | Status: SHIPPED | OUTPATIENT
Start: 2018-11-16 | End: 2019-02-26

## 2018-11-16 RX ORDER — PREDNISONE 20 MG/1
20 TABLET ORAL 2 TIMES DAILY WITH MEALS
Qty: 12 TABLET | Refills: 0 | Status: SHIPPED | OUTPATIENT
Start: 2018-11-16 | End: 2019-02-26

## 2018-11-16 ASSESSMENT — PAIN SCALES - GENERAL: PAINLEVEL: SEVERE PAIN (7)

## 2018-11-16 NOTE — TELEPHONE ENCOUNTER
"Reason for call:  Medication question    Phone number to reach pharmacy [facility] [phone]    Best Time:  any    Can we leave a detailed message on this number?  YES      Re: neomycin-polymyxin-HC 1 % SOLN    Price is too costly, can you order a substitute?    Try \"OFLOXACIN ANTIBIOTIC EAR DROP\"    Or  \"HYDROCORTISONE/ACETIC ACID\" more for pain   inflammation    Or do both to cover both symptoms of pain/inflamation and infection        "

## 2018-11-16 NOTE — PROGRESS NOTES
"  SUBJECTIVE:   Stephon Bryant is a 73 year old male who presents to clinic today for the following health issues:      ENT Symptoms             Symptoms: cc Present Absent Comment   Fever/Chills   x    Fatigue  x     Muscle Aches   x    Eye Irritation   x    Sneezing  x     Nasal Rafael/Drg   x    Sinus Pressure/Pain   x    Loss of smell   x    Dental pain   x    Sore Throat   x    Swollen Glands   x    Ear Pain/Fullness  x  Sharp pain in right ear - constant. No hearing changes   Cough   x    Wheeze   x    Chest Pain   x    Shortness of breath   x    Rash   x    Other  x  Dizziness - room spinning when sitting up in the morning     Symptom duration:  about 2 weeks   Symptom severity:  moderate   Treatments tried:  none   Contacts:         Medications updated and reviewed.  Past, family and surgical history is updated and reviewed in the record.    ROS:  Other than noted above, general, HEENT, respiratory, cardiac and gastrointestinal systems are negative.    This document serves as a record of the services and decisions personally performed and made by Dr. Cote. It was created on his behalf by Leatha Goncalves, a trained medical scribe. The creation of this document is based the provider's statements to the medical scribe.  Leatha Goncalves November 16, 2018 11:38 AM     OBJECTIVE:                                                    /87  Pulse 69  Temp 97.6  F (36.4  C) (Oral)  Resp 20  Ht 1.721 m (5' 7.75\")  Wt 64 kg (141 lb)  SpO2 99%  BMI 21.6 kg/m2   Body mass index is 21.6 kg/(m^2).   GENERAL APPEARANCE ADULT: Alert, no acute distress  EYES: PERRL, EOM normal, conjunctiva and lids normal  HENT: right TM not visualize due to cerumen impaction - after removal there is erythema all along the posterior and superior surface of the ear canal as well as scarring and evidence for perforation of the right TM, left TM scarring and evidence of a large perforation which seems to have reepithelialized  MS: extremities " normal, no peripheral edema  SKIN: no suspicious lesions or rashes  NEURO: Alert, oriented, speech and mentation normal  PSYCH: mentation appears normal., affect and mood normal    Diagnostic Test Results:  none      ASSESSMENT/PLAN:                                                      (R42) Vertigo  (primary encounter diagnosis)  Comment: some of his history suggests BPPV, but the symptoms are present longer than usual, and I wonder if there is any association with the ear pain  Plan: OTOLARYNGOLOGY REFERRAL, predniSONE (DELTASONE)        20 MG tablet          (H61.21) Impacted cerumen of right ear  Comment: resolved  Plan: REMOVE IMPACTED CERUMEN        I curetted the impacted mass of cerumen and epithelial skin.    (H60.501) Acute otitis externa of right ear, unspecified type  Comment: some of the erythema may be from the trauma of the cerumen removal, but it extends well beyond where his cerumen was  Plan:Oflox otic SOLN, azithromycin         (ZITHROMAX) 250 MG tablet        Follow up as needed.     (Z71.89) Travel advice encounter  Comment: the patient requests a just in case antibiotic for an upcoming trip  Plan: azithromycin (ZITHROMAX) 250 MG tablet            The information in this document, created by the medical scribe for me, accurately reflects the services I personally performed and the decisions made by me. I have reviewed and approved this document for accuracy prior to leaving the patient care area. November 16, 2018 11:38 AM     Fidencio Cote MD

## 2018-11-16 NOTE — TELEPHONE ENCOUNTER
Reason for Call:  Other Medication question     Detailed comments: pharmacy is wondering if they're able to switch to eye drops due to is being cheaper. Please call pharmacy right away. Thanks.     Phone Number Pharmacy can be reached at: [phone]    Best Time: anytime     Can we leave a detailed message on this number? Not Applicable    Call taken on 11/16/2018 at 3:29 PM by Janell Jaquez

## 2018-11-16 NOTE — PATIENT INSTRUCTIONS
At [facility], we strive to deliver an exceptional experience to you, every time we see you.  If you receive a survey in the mail, please send us back your thoughts. We really do value your feedback.    Your care team:                            Family Medicine Internal Medicine   MD Tripp Wilde MD Shantel Branch-Fleming, MD Katya Georgiev PA-C Megan Hill, APRN SAMANTHA Hough MD Pediatrics   Jony Crawley, LIZZETH Segura, MD Marry Andrews APRN CNP   MD Desire Brown MD Deborah Mielke, MD Jocelyn Flores, APRN [facility]      Clinic hours: Monday - Thursday 7 am-7 pm; Fridays 7 am-5 pm.   Urgent care: Monday - Friday 11 am-9 pm; Saturday and Sunday 9 am-5 pm.  Pharmacy : Monday -Thursday 8 am-8 pm; Friday 8 am-6 pm; Saturday and Sunday 9 am-5 pm.     Clinic: [phone]   Pharmacy: [phone]

## 2018-11-16 NOTE — MR AVS SNAPSHOT
After Visit Summary   11/16/2018    Stephon Bryant    MRN: [id number]           Patient Information     Date Of Birth          1945        Visit Information        Provider Department      11/16/2018 11:00 AM Fidencio Cote MD [facility]        Today's Diagnoses     Vertigo    -  1    Impacted cerumen of right ear        Acute otitis externa of right ear, unspecified type        Travel advice encounter          Care Instructions    At [facility], we strive to deliver an exceptional experience to you, every time we see you.  If you receive a survey in the mail, please send us back your thoughts. We really do value your feedback.    Your care team:                            Family Medicine Internal Medicine   MD Tripp Wilde MD Shantel Branch-Fleming, MD Katya Georgiev PA-C Megan Hill, APRKATIA Hough MD Pediatrics   Jony Crawley, LIZZETH Segura, MD Marry Andrews APRN CNP   MD Desire Brown MD Deborah Mielke, MD Kim Thein, APR[facility]      Clinic hours: Monday - Thursday 7 am-7 pm; Fridays 7 am-5 pm.   Urgent care: Monday - Friday 11 am-9 pm; Saturday and Sunday 9 am-5 pm.  Pharmacy : Monday -Thursday 8 am-8 pm; Friday 8 am-6 pm; Saturday and Sunday 9 am-5 pm.     Clinic: [phone]   Pharmacy: [phone]                Follow-ups after your visit        Additional Services     OTOLARYNGOLOGY REFERRAL       Your provider has referred you to:     [facility] [phone]   [email or web address]  [facility] [phone]   [email or web address]  [facility] [place] [facility] [phone]   [email or web address]  [facility] [phone]   [email or web address]    [facility] [place] N[facility]  (Otolaryngology) [facility] [phone]  [email or web address]  [facility] [phone]   [email or web address]    Please be aware that coverage of these services is subject to the terms and limitations of your health insurance plan.  Call member services at your health plan with any benefit or coverage questions.      Please bring the following with you to your appointment:    (1) Any X-Rays, CTs or MRIs which have been performed.  Contact the facility where they were done to arrange for  prior to your scheduled appointment.   (2) List of current medications  (3) This referral request   (4) Any documents/labs given to you for this referral                  Follow-up notes from your care team     Return if symptoms worsen or fail to improve.      Who to contact     If you have questions or need follow up information about today's clinic visit or your schedule please contact [facility] directly at [phone].  Normal or non-critical lab and imaging results will be communicated to you by MyChart, letter or phone within 4 business days after the clinic has received the results. If you do not hear from us within 7 days, please contact the clinic through niiuhart or phone. If you have a critical or abnormal lab result, we will notify you by phone as soon as possible.  Submit refill requests through Truzip or call your pharmacy and they will forward the refill request to us. Please allow 3 business days for your refill to be completed.          Additional Information About Your Visit        Truzip Information     [employer]zip gives you secure access to your electronic health record. If you see a primary care provider, you can also send messages to your care team and make appointments. If you have questions, please call your primary care clinic.  If you do not  "have a primary care provider, please call [phone] and they will assist you.        Care EveryWhere ID     This is your Care EveryWhere ID. This could be used by other organizations to access your [facility] medical records  [id number]        Your Vitals Were     Pulse Temperature Respirations Height Pulse Oximetry BMI (Body Mass Index)    69 97.6  F (36.4  C) (Oral) 20 1.721 m (5' 7.75\") 99% 21.6 kg/m2       Blood Pressure from Last 3 Encounters:   11/16/18 132/68   11/06/18 (!) 137/95   10/18/18 139/79    Weight from Last 3 Encounters:   11/16/18 64 kg (141 lb)   11/06/18 61.2 kg (135 lb)   10/18/18 62.1 kg (136 lb 12.8 oz)              We Performed the Following     OTOLARYNGOLOGY REFERRAL     REMOVE IMPACTED CERUMEN          Today's Medication Changes          These changes are accurate as of 11/16/18 11:57 AM.  If you have any questions, ask your nurse or doctor.               Start taking these medicines.        Dose/Directions    azithromycin 250 MG tablet   Commonly known as:  ZITHROMAX   Used for:  Acute otitis externa of right ear, unspecified type, Travel advice encounter   Started by:  Fidencio Cote MD        2 tablets on the first day, then 1 tablet daily for 4 more days.   Quantity:  6 tablet   Refills:  0       neomycin-polymyxin-HC 1 % Soln   Used for:  Acute otitis externa of right ear, unspecified type   Started by:  Fidencio Cote MD        Dose:  4 drop   Place 4 drops into the right ear 4 times daily for 7 days for outer ear infection.   Quantity:  7.5 mL   Refills:  0       predniSONE 20 MG tablet   Commonly known as:  DELTASONE   Used for:  Vertigo   Started by:  Fidencio Cote MD        Dose:  20 mg   Take 1 tablet (20 mg) by mouth 2 times daily (with meals) for 6 days   Quantity:  12 tablet   Refills:  0            Where to get your medicines      These medications were sent to [id number] - [place], [place] - [address]  [address] " [address]     Phone:  [phone]     azithromycin 250 MG tablet    neomycin-polymyxin-HC 1 % Soln         Call your pharmacy to confirm that your medication is ready for pickup. It may take up to 24 hours for them to receive the prescription. If the prescription is not ready within 3 business days, please contact your clinic or your provider.     We will let you know when these medications are ready. If you don't hear back within 3 business days, please contact us.     predniSONE 20 MG tablet                Primary Care Provider Office Phone # Fax #    Fidencio Cote [phone]       [address]  [facility] [address]        Equal Access to Services     [facility]: Hadii laura argueta hadasho Sogeorgeali, waaxda luqadaha, qaybta kaalmada adeegyada, julia lucia . So [facility] [phone].    ATENCIÓN: Si habla español, tiene a sims disposición servicios gratuitos de asistencia lingüística. Llame al [phone].    We comply with applicable federal civil rights laws and [place] laws. We do not discriminate on the basis of race, color, national origin, age, disability, sex, sexual orientation, or gender identity.            Thank you!     Thank you for choosing [facility]  for your care. Our goal is always to provide you with excellent care. Hearing back from our patients is one way we can continue to improve our services. Please take a few minutes to complete the written survey that you may receive in the mail after your visit with us. Thank you!             Your Updated Medication List - Protect others around you: Learn how to safely use, store and throw away your medicines at www.disposemymeds.org.          This list is accurate as of 11/16/18 11:57 AM.  Always use your most recent med list.                   Brand Name Dispense Instructions for use Diagnosis    aspirin 81 MG tablet      Take 1 tablet by mouth daily *.        azithromycin 250 MG tablet     ZITHROMAX    6 tablet    2 tablets on the first day, then 1 tablet daily for 4 more days.    Acute otitis externa of right ear, unspecified type, Travel advice encounter       BIOFLEX PO      Take 1 capful by mouth 1 capsule every other day.        Blood Pressure Monitor Kit      As directed.        cholecalciferol 5000 units (125 mcg) Caps capsule    vitamin D3     Take by mouth daily        FISH OIL      1 capsule every other day        lisinopril 2.5 MG tablet    PRINIVIL/Zestril    90 tablet    Take 1 tablet (2.5 mg) by mouth daily for kidneys in diabetes.    Type 2 diabetes mellitus without complication, without long-term current use of insulin (H)       meclizine 25 MG tablet    ANTIVERT    60 tablet    Take 1 tablet (25 mg) by mouth 3 times daily as needed for dizziness    Vertigo       MULTIVITAMIN PO      1 tab every other day        neomycin-polymyxin-HC 1 % Soln     7.5 mL    Place 4 drops into the right ear 4 times daily for 7 days for outer ear infection.    Acute otitis externa of right ear, unspecified type       omeprazole 20 MG CR capsule    priLOSEC    90 capsule    Take 1 capsule (20 mg) by mouth daily as needed for reflux.    Gastroesophageal reflux disease without esophagitis       ONETOUCH VERIO IQ test strip   Generic drug:  blood glucose monitoring     100 each    use to test blood sugar 2 times daily or as directed    Type 2 diabetes mellitus without complication, without long-term current use of insulin (H)       pravastatin 40 MG tablet    PRAVACHOL    90 tablet    Take 1 tablet (40 mg) by mouth every evening for cholesterol.    Hyperlipidemia LDL goal <100       predniSONE 20 MG tablet    DELTASONE    12 tablet    Take 1 tablet (20 mg) by mouth 2 times daily (with meals) for 6 days    Vertigo       tamsulosin 0.4 MG capsule    FLOMAX    90 capsule    Take 1 capsule (0.4 mg) by mouth daily for urine flow.    Benign prostatic hyperplasia with weak urinary stream       traZODone 50 MG  tablet    DESYREL    90 tablet    Take 0.5-1 tablets (25-50 mg) by mouth nightly as needed for sleep    Persistent disorder of initiating or maintaining sleep

## 2018-11-19 ENCOUNTER — TELEPHONE (OUTPATIENT)
Dept: FAMILY MEDICINE | Facility: CLINIC | Age: 73
End: 2018-11-19

## 2018-11-19 NOTE — TELEPHONE ENCOUNTER
...Reason for Call:  prescription    Detailed comments: Pharmacy called was wondering when will the script be sent for EAR DROPS.    Phone Number Patient can be reached at: [phone]    Best Time: ANYTIME    Can we leave a detailed message on this number? YES    Call taken on 11/19/2018 at 9:05 AM by Amos Tellez

## 2019-01-22 ENCOUNTER — OFFICE VISIT (OUTPATIENT)
Dept: OTOLARYNGOLOGY | Facility: CLINIC | Age: 74
End: 2019-01-22
Payer: COMMERCIAL

## 2019-01-22 VITALS
HEIGHT: 67 IN | SYSTOLIC BLOOD PRESSURE: 147 MMHG | RESPIRATION RATE: 12 BRPM | BODY MASS INDEX: 21.97 KG/M2 | HEART RATE: 70 BPM | WEIGHT: 140 LBS | OXYGEN SATURATION: 97 % | DIASTOLIC BLOOD PRESSURE: 83 MMHG

## 2019-01-22 DIAGNOSIS — G50.1 ATYPICAL FACIAL PAIN: ICD-10-CM

## 2019-01-22 DIAGNOSIS — H81.10 BENIGN PAROXYSMAL POSITIONAL VERTIGO, UNSPECIFIED LATERALITY: ICD-10-CM

## 2019-01-22 DIAGNOSIS — R42 DIZZINESS: Primary | ICD-10-CM

## 2019-01-22 PROCEDURE — 99204 OFFICE O/P NEW MOD 45 MIN: CPT | Performed by: OTOLARYNGOLOGY

## 2019-01-22 ASSESSMENT — MIFFLIN-ST. JEOR: SCORE: 1348.67

## 2019-01-22 NOTE — LETTER
1/22/2019         RE: Stephon Bryant  [address]  [facility] [place] [facility] [address]        Dear Colleague,    Thank you for referring your patient, Stephon Bryant, to the [facility]. Please see a copy of my visit note below.    History of Present Illness - Stephon Bryant is a 73 year old male here to see me for the first time for dizziness, at the consultation of Dr. Cote.    He tells me that for many years he has had issues with his ears.  He has had worsening pain in both ears.  He tells me that he had a history of tympanic membrane perforation due to two different injuries in his life.  He is very sensitive to pressure such as swimming.    The main reason he decided to come to ENT was because he has become very sensitive to sudden movements of his head.  He will get spinning dizziness for a few moments, and has to steady himself.  At most, five minutes.  However, he also reports that he cannot walk straight.  It will also happen if he lays back in bed too fast.    He tells me that he has had many hearing tests, and therefore declined an audigoram today as part of this consultation.    The next head and neck issue he wanted to discuss a history of over ten years of pain that comes and goes across his forehead.    Past Medical History -   Patient Active Problem List   Diagnosis     Hyperlipidemia LDL goal <100     History of basal cell carcinoma     Erectile dysfunction     Advance care planning     Gastroesophageal reflux disease without esophagitis     Persistent disorder of initiating or maintaining sleep     Type 2 diabetes mellitus without complication, without long-term current use of insulin (H)       Current Medications -   Current Outpatient Medications:      aspirin 81 MG tablet, Take 1 tablet by mouth daily *. , Disp: , Rfl:      Bioflavonoid Products (BIOFLEX PO), Take 1 capful by mouth 1 capsule every other day., Disp: , Rfl:      Blood Pressure Monitor KIT, As directed., Disp: ,  Rfl:      cholecalciferol (VITAMIN D3) 5000 UNITS CAPS capsule, Take by mouth daily, Disp: , Rfl:      FISH OIL, 1 capsule every other day, Disp: , Rfl:      lisinopril (PRINIVIL/ZESTRIL) 2.5 MG tablet, Take 1 tablet (2.5 mg) by mouth daily for kidneys in diabetes., Disp: 90 tablet, Rfl: 3     meclizine (ANTIVERT) 25 MG tablet, Take 1 tablet (25 mg) by mouth 3 times daily as needed for dizziness, Disp: 60 tablet, Rfl: 0     Multiple Vitamins-Minerals (MULTIVITAMIN OR), 1 tab every other day, Disp: , Rfl:      omeprazole (PRILOSEC) 20 MG CR capsule, Take 1 capsule (20 mg) by mouth daily as needed for reflux., Disp: 90 capsule, Rfl: 1     ONETOUCH VERIO IQ test strip, use to test blood sugar 2 times daily or as directed, Disp: 100 each, Rfl: 2     pravastatin (PRAVACHOL) 40 MG tablet, Take 1 tablet (40 mg) by mouth every evening for cholesterol., Disp: 90 tablet, Rfl: 1     tamsulosin (FLOMAX) 0.4 MG capsule, Take 1 capsule (0.4 mg) by mouth daily for urine flow., Disp: 90 capsule, Rfl: 1     traZODone (DESYREL) 50 MG tablet, Take 0.5-1 tablets (25-50 mg) by mouth nightly as needed for sleep, Disp: 90 tablet, Rfl: 1    Allergies -   Allergies   Allergen Reactions     No Clinical Screening - See Comments      No latex allergy     Penicillins Other (See Comments)     PN: rash     Proanthocyanidin      PN: face swell, rash hands     Shellfish-Derived Products      PN: shrimp, face swelling       Social History -   Social History     Socioeconomic History     Marital status:      Spouse name: Not on file     Number of children: 6     Years of education: Not on file     Highest education level: Not on file   Social Needs     Financial resource strain: Not on file     Food insecurity - worry: Not on file     Food insecurity - inability: Not on file     Transportation needs - medical: Not on file     Transportation needs - non-medical: Not on file   Occupational History     Not on file   Tobacco Use     Smoking  "status: Former Smoker     Packs/day: 0.30     Years: 7.00     Pack years: 2.10     Types: Cigarettes     Smokeless tobacco: Never Used     Tobacco comment: [id number] (in service)   Substance and Sexual Activity     Alcohol use: No     Drug use: No     Sexual activity: Not Currently     Partners: Female     Birth control/protection: None   Other Topics Concern     Parent/sibling w/ CABG, MI or angioplasty before 65F 55M? Not Asked   Social History Narrative     Not on file       Family History -   Family History   Problem Relation Age of Onset     Heart Disease Father      Diabetes Sister      Cancer Brother 64        lymphoma?     Breast Cancer Sister      Prostate Cancer No family hx of         unk     Hypertension No family hx of      Cerebrovascular Disease No family hx of        Review of Systems - As per HPI and PMHx, otherwise 10+ system review of the head and neck, and general constitution is negative.    Physical Exam  /83   Pulse 70   Resp 12   Ht 1.702 m (5' 7\")   Wt 63.5 kg (140 lb)   SpO2 97%   BMI 21.93 kg/m       General - The patient is well nourished and well developed, and appears to have good nutritional status.  Alert and oriented to person and place, answers questions and cooperates with examination appropriately.   Head and Face - Normocephalic and atraumatic, with no gross asymmetry noted of the contour of the facial features.  The facial nerve is intact, with strong symmetric movements.  Voice and Breathing - The patient was breathing comfortably without the use of accessory muscles. There was no wheezing, stridor, or stertor.  The patients voice was clear and strong, and had appropriate pitch and quality.  Ears - The tympanic membranes are heavily scarred bilaterally, with large inferior monomers suggesting previous perforations, the tympanic membrane's are about 50% tympanosclerosis bilaterally.  However,t here is no obvious effusion or any sing of infection and the canals are " clear.  Eyes - Extraocular movements intact, and the pupils were reactive to light.  Sclera were not icteric or injected, conjunctiva were pink and moist.  Mouth - Examination of the oral cavity showed pink, healthy oral mucosa. No lesions or ulcerations noted.  The tongue was mobile and midline, and the dentition were in good condition.    Throat - The walls of the oropharynx were smooth, pink, moist, symmetric, and had no lesions or ulcerations.  The tonsillar pillars and soft palate were symmetric.  The uvula was midline on elevation.    Neck - Normal midline excursion of the laryngotracheal complex during swallowing.  Full range of motion on passive movement.  Palpation of the occipital, submental, submandibular, internal jugular chain, and supraclavicular nodes did not demonstrate any abnormal lymph nodes or masses.  The carotid pulse was palpable bilaterally.  Palpation of the thyroid was soft and smooth, with no nodules or goiter appreciated.  The trachea was mobile and midline.  Nose - External contour is symmetric, no gross deflection or scars.  Nasal mucosa is pink and moist with no abnormal mucus.  The septum was midline and non-obstructive, turbinates of normal size and position.  No polyps, masses, or purulence noted on examination.      A/P - Stephon Bryant is a 73 year old male  (R42) Dizziness  (primary encounter diagnosis)  (H81.10) Benign paroxysmal positional vertigo, unspecified laterality  (G50.1) Atypical facial pain    I think there are two different issues here actually.     I am going to order a sinus CT to look at the nose, the TMJ, and the inner ears and will call him with results.    For the vertigo, I think this is benign paroxysmal positional vertigo.  Based on today's exam and history, I think that the most likely diagnosis at this point is benign paroxysmal positional vertigo.  The etiology of benign paroxysmal positional vertigo being small crystals tumbling in the semicircular canals  was discussed at length.  Also, the treatment of the problem with physical therapy for canalith repositioning maneuvers was discussed.    I will send the patient to physical therapy for this to be done.  The patient was very specifically instructed to return to me should the therapy prove unsuccessful.  In which case we will search for other possible causes, as well as discuss possible further imaging such as MRI.      Again, thank you for allowing me to participate in the care of your patient.        Sincerely,        Kedar Francois MD

## 2019-01-22 NOTE — PATIENT INSTRUCTIONS
Scheduling Information  To schedule your CT/MRI scan, please contact [facility] at [phone] OR [facility] at [phone]    To schedule your Surgery, please contact our Specialty Schedulers at [phone]      ENT Clinic Locations Clinic Hours Telephone Number     [facility]  [address] [place] [address]   Monday:           1:00pm -- 5:00pm    Friday:              8:00am - 12:00pm   To schedule/reschedule an appointment with   Dr. Francois,   please contact our   Specialty Scheduling Department at:     [phone]       [facility]  [address] [place] [address] Tuesday:          8:00am -- 2:00pm         Urgent Care Locations Clinic Hours Telephone Numbers     [facility]  [address] [place] [address]     Monday-Friday:     11:00am - 9:00pm    Saturday-Sunday:  9:00am - 5:00pm   [phone]     [facility]  [address] [place] [address]     Monday-Friday:      5:00pm - 9:00pm     Saturday-Sunday:  9:00am - 5:00pm   [phone]

## 2019-01-22 NOTE — PROGRESS NOTES
History of Present Illness - Stephon Bryant is a 73 year old male here to see me for the first time for dizziness, at the consultation of Dr. Cote.    He tells me that for many years he has had issues with his ears.  He has had worsening pain in both ears.  He tells me that he had a history of tympanic membrane perforation due to two different injuries in his life.  He is very sensitive to pressure such as swimming.    The main reason he decided to come to ENT was because he has become very sensitive to sudden movements of his head.  He will get spinning dizziness for a few moments, and has to steady himself.  At most, five minutes.  However, he also reports that he cannot walk straight.  It will also happen if he lays back in bed too fast.    He tells me that he has had many hearing tests, and therefore declined an audigoram today as part of this consultation.    The next head and neck issue he wanted to discuss a history of over ten years of pain that comes and goes across his forehead.    Past Medical History -   Patient Active Problem List   Diagnosis     Hyperlipidemia LDL goal <100     History of basal cell carcinoma     Erectile dysfunction     Advance care planning     Gastroesophageal reflux disease without esophagitis     Persistent disorder of initiating or maintaining sleep     Type 2 diabetes mellitus without complication, without long-term current use of insulin (H)       Current Medications -   Current Outpatient Medications:      aspirin 81 MG tablet, Take 1 tablet by mouth daily *. , Disp: , Rfl:      Bioflavonoid Products (BIOFLEX PO), Take 1 capful by mouth 1 capsule every other day., Disp: , Rfl:      Blood Pressure Monitor KIT, As directed., Disp: , Rfl:      cholecalciferol (VITAMIN D3) 5000 UNITS CAPS capsule, Take by mouth daily, Disp: , Rfl:      FISH OIL, 1 capsule every other day, Disp: , Rfl:      lisinopril (PRINIVIL/ZESTRIL) 2.5 MG tablet, Take 1 tablet (2.5 mg) by mouth daily for kidneys  in diabetes., Disp: 90 tablet, Rfl: 3     meclizine (ANTIVERT) 25 MG tablet, Take 1 tablet (25 mg) by mouth 3 times daily as needed for dizziness, Disp: 60 tablet, Rfl: 0     Multiple Vitamins-Minerals (MULTIVITAMIN OR), 1 tab every other day, Disp: , Rfl:      omeprazole (PRILOSEC) 20 MG CR capsule, Take 1 capsule (20 mg) by mouth daily as needed for reflux., Disp: 90 capsule, Rfl: 1     ONETOUCH VERIO IQ test strip, use to test blood sugar 2 times daily or as directed, Disp: 100 each, Rfl: 2     pravastatin (PRAVACHOL) 40 MG tablet, Take 1 tablet (40 mg) by mouth every evening for cholesterol., Disp: 90 tablet, Rfl: 1     tamsulosin (FLOMAX) 0.4 MG capsule, Take 1 capsule (0.4 mg) by mouth daily for urine flow., Disp: 90 capsule, Rfl: 1     traZODone (DESYREL) 50 MG tablet, Take 0.5-1 tablets (25-50 mg) by mouth nightly as needed for sleep, Disp: 90 tablet, Rfl: 1    Allergies -   Allergies   Allergen Reactions     No Clinical Screening - See Comments      No latex allergy     Penicillins Other (See Comments)     PN: rash     Proanthocyanidin      PN: face swell, rash hands     Shellfish-Derived Products      PN: shrimp, face swelling       Social History -   Social History     Socioeconomic History     Marital status:      Spouse name: Not on file     Number of children: 6     Years of education: Not on file     Highest education level: Not on file   Social Needs     Financial resource strain: Not on file     Food insecurity - worry: Not on file     Food insecurity - inability: Not on file     Transportation needs - medical: Not on file     Transportation needs - non-medical: Not on file   Occupational History     Not on file   Tobacco Use     Smoking status: Former Smoker     Packs/day: 0.30     Years: 7.00     Pack years: 2.10     Types: Cigarettes     Smokeless tobacco: Never Used     Tobacco comment: [id number] (in service)   Substance and Sexual Activity     Alcohol use: No     Drug use: No     Sexual  "activity: Not Currently     Partners: Female     Birth control/protection: None   Other Topics Concern     Parent/sibling w/ CABG, MI or angioplasty before 65F 55M? Not Asked   Social History Narrative     Not on file       Family History -   Family History   Problem Relation Age of Onset     Heart Disease Father      Diabetes Sister      Cancer Brother 64        lymphoma?     Breast Cancer Sister      Prostate Cancer No family hx of         unk     Hypertension No family hx of      Cerebrovascular Disease No family hx of        Review of Systems - As per HPI and PMHx, otherwise 10+ system review of the head and neck, and general constitution is negative.    Physical Exam  /83   Pulse 70   Resp 12   Ht 1.702 m (5' 7\")   Wt 63.5 kg (140 lb)   SpO2 97%   BMI 21.93 kg/m      General - The patient is well nourished and well developed, and appears to have good nutritional status.  Alert and oriented to person and place, answers questions and cooperates with examination appropriately.   Head and Face - Normocephalic and atraumatic, with no gross asymmetry noted of the contour of the facial features.  The facial nerve is intact, with strong symmetric movements.  Voice and Breathing - The patient was breathing comfortably without the use of accessory muscles. There was no wheezing, stridor, or stertor.  The patients voice was clear and strong, and had appropriate pitch and quality.  Ears - The tympanic membranes are heavily scarred bilaterally, with large inferior monomers suggesting previous perforations, the tympanic membrane's are about 50% tympanosclerosis bilaterally.  However,t here is no obvious effusion or any sing of infection and the canals are clear.  Eyes - Extraocular movements intact, and the pupils were reactive to light.  Sclera were not icteric or injected, conjunctiva were pink and moist.  Mouth - Examination of the oral cavity showed pink, healthy oral mucosa. No lesions or ulcerations noted.  " The tongue was mobile and midline, and the dentition were in good condition.    Throat - The walls of the oropharynx were smooth, pink, moist, symmetric, and had no lesions or ulcerations.  The tonsillar pillars and soft palate were symmetric.  The uvula was midline on elevation.    Neck - Normal midline excursion of the laryngotracheal complex during swallowing.  Full range of motion on passive movement.  Palpation of the occipital, submental, submandibular, internal jugular chain, and supraclavicular nodes did not demonstrate any abnormal lymph nodes or masses.  The carotid pulse was palpable bilaterally.  Palpation of the thyroid was soft and smooth, with no nodules or goiter appreciated.  The trachea was mobile and midline.  Nose - External contour is symmetric, no gross deflection or scars.  Nasal mucosa is pink and moist with no abnormal mucus.  The septum was midline and non-obstructive, turbinates of normal size and position.  No polyps, masses, or purulence noted on examination.      A/P - Stephon Bryant is a 73 year old male  (R42) Dizziness  (primary encounter diagnosis)  (H81.10) Benign paroxysmal positional vertigo, unspecified laterality  (G50.1) Atypical facial pain    I think there are two different issues here actually.     I am going to order a sinus CT to look at the nose, the TMJ, and the inner ears and will call him with results.    For the vertigo, I think this is benign paroxysmal positional vertigo.  Based on today's exam and history, I think that the most likely diagnosis at this point is benign paroxysmal positional vertigo.  The etiology of benign paroxysmal positional vertigo being small crystals tumbling in the semicircular canals was discussed at length.  Also, the treatment of the problem with physical therapy for canalith repositioning maneuvers was discussed.    I will send the patient to physical therapy for this to be done.  The patient was very specifically instructed to return to me  should the therapy prove unsuccessful.  In which case we will search for other possible causes, as well as discuss possible further imaging such as MRI.

## 2019-01-29 ENCOUNTER — ANCILLARY PROCEDURE (OUTPATIENT)
Dept: CT IMAGING | Facility: CLINIC | Age: 74
End: 2019-01-29
Payer: COMMERCIAL

## 2019-01-29 DIAGNOSIS — R42 DIZZINESS: ICD-10-CM

## 2019-01-29 DIAGNOSIS — G50.1 ATYPICAL FACIAL PAIN: ICD-10-CM

## 2019-01-29 DIAGNOSIS — H81.10 BENIGN PAROXYSMAL POSITIONAL VERTIGO, UNSPECIFIED LATERALITY: ICD-10-CM

## 2019-01-29 PROCEDURE — 70486 CT MAXILLOFACIAL W/O DYE: CPT | Performed by: RADIOLOGY

## 2019-01-31 DIAGNOSIS — G50.1 ATYPICAL FACIAL PAIN: Primary | ICD-10-CM

## 2019-01-31 NOTE — PROGRESS NOTES
Called and spoke with the patient.  The CT sinus looks great.  Therefore, my main diagnosis at this point for the sporadic pains across the forehead and sides of head is temporomandibular disease.  I have referred to [facility] PT.    If not improving or worsening please return to me in clinic

## 2019-02-11 ENCOUNTER — THERAPY VISIT (OUTPATIENT)
Dept: PHYSICAL THERAPY | Facility: CLINIC | Age: 74
End: 2019-02-11
Payer: COMMERCIAL

## 2019-02-11 DIAGNOSIS — G50.1 ATYPICAL FACIAL PAIN: ICD-10-CM

## 2019-02-11 PROCEDURE — G8981 BODY POS CURRENT STATUS: HCPCS | Mod: GP | Performed by: PHYSICAL THERAPIST

## 2019-02-11 PROCEDURE — G8982 BODY POS GOAL STATUS: HCPCS | Mod: GP | Performed by: PHYSICAL THERAPIST

## 2019-02-11 PROCEDURE — 97140 MANUAL THERAPY 1/> REGIONS: CPT | Mod: GP | Performed by: PHYSICAL THERAPIST

## 2019-02-11 PROCEDURE — 97530 THERAPEUTIC ACTIVITIES: CPT | Mod: GP | Performed by: PHYSICAL THERAPIST

## 2019-02-11 PROCEDURE — 97110 THERAPEUTIC EXERCISES: CPT | Mod: GP | Performed by: PHYSICAL THERAPIST

## 2019-02-11 PROCEDURE — 97161 PT EVAL LOW COMPLEX 20 MIN: CPT | Mod: GP | Performed by: PHYSICAL THERAPIST

## 2019-02-11 NOTE — PROGRESS NOTES
[facility] Initial Evaluation  Subjective:    Stephon Bryant is a 73 year old male with a TMJ right condition.  Condition occurred with:  Insidious onset.  Condition occurred: for unknown reasons.  This is a chronic condition  Patient reports onset of right sided ear pain in December 2018 without a specific mechanism of injury or change in daily routine. Patient reports having this same pain 20 years ago but it went away on its own.    Patient reports pain:  TMJ left.  Radiates to:  Ear.  Pain is described as aching and sharp and is constant and reported as 8/10.  Associated symptoms:  Ear plugging, ear ache and vertigo. Pain is worse during the day.  Symptoms are exacerbated by opening and yawning and relieved by activity/movement.  Since onset symptoms are unchanged.  Special tests:  CT scan.      General health as reported by patient is good.                      Red flags:  None as reported by the patient.                        Objective:  Standing Alignment:    Cervical/Thoracic:  Forward head  Shoulder/UE:  Rounded shoulders                                                          TMJ Evaluation  ROM:     AROM Cervical:    Flexion: WNL Overpressure: Pain:  Extension: WNL Overpressure: Pain:  Left Side Bend: 40 deg Overpressure: Pain:  Right Side Bend: 40 deg Overpressure: Pain:  Left Rotation: 45 deg Overpressure: Pain:  Right Rotation: 50 deg Overpressure: Pain:      Associated Findings: Headaches:  Cervical and temporal  Parafunctional Habits:    Bruxism:  Patient is not aware    Chewing/Biting Nails:  Gum occasionally    Clenching:  Yes    Caffeine:  2 cups coffee per day    Aerobic Exercise:  No  Sleep Quality:  Good      Palpation:      Right side tenderness present at:  Superficial Masseter; Deep Masseter and Temporalis    Movement Characteristics:    Click:  Patient reports occasional   Crepitus:  None  Deviations:  To the right at end range opening  Subluxation:  None    TMJ  Findings:    Tongue Positioning:  Floor                          General     ROS    Assessment/Plan:    Patient is a 73 year old male with right side TMJ complaints.    Patient has the following significant findings with corresponding treatment plan.                Diagnosis 1:  Right TMJD  Pain -  hot/cold therapy, manual therapy, self management, education and home program  Decreased joint mobility - manual therapy, therapeutic exercise, therapeutic activity and home program  Inflammation - cold therapy and self management/home program  Impaired muscle performance - neuro re-education and home program  Decreased function - therapeutic activities and home program  Impaired posture - neuro re-education, therapeutic activities and home program    Therapy Evaluation Codes:   1) History comprised of:   Personal factors that impact the plan of care:      None.    Comorbidity factors that impact the plan of care are:      None.     Medications impacting care: None.  2) Examination of Body Systems comprised of:   Body structures and functions that impact the plan of care:      TMJ.   Activity limitations that impact the plan of care are:      Chewing, yawning.  3) Clinical presentation characteristics are:   Stable/Uncomplicated.  4) Decision-Making    Low complexity using standardized patient assessment instrument and/or measureable assessment of functional outcome.  Cumulative Therapy Evaluation is: Low complexity.    Previous and current functional limitations:  (See Goal Flow Sheet for this information)    Short term and Long term goals: (See Goal Flow Sheet for this information)     Communication ability:  Patient appears to be able to clearly communicate and understand verbal and written communication and follow directions correctly.  Treatment Explanation - The following has been discussed with the patient:   RX ordered/plan of care  Anticipated outcomes  Possible risks and side effects  This patient would benefit  from PT intervention to resume normal activities.   Rehab potential is good.    Frequency:  1 X week, once daily  Duration:  for 4 weeks  Discharge Plan:  Achieve all LTG.  Independent in home treatment program.  Reach maximal therapeutic benefit.    Please refer to the daily flowsheet for treatment today, total treatment time and time spent performing 1:1 timed codes.

## 2019-02-11 NOTE — LETTER
[facility] ROMANO PT  [address]  [address]  [place] [address]  [phone]    2019    Re: Stephon Bryant   :   1945  MRN:  [id number]   REFERRING PHYSICIAN:   MD QAMAR Taylor PT  Date of Initial Evaluation:  2019  Visits:  Rxs Used: 1  Reason for Referral:  Atypical facial pain    EVALUATION SUMMARY    [facility] Initial Evaluation  Subjective:  Stephon Bryant is a 73 year old male with a TMJ right condition.  Condition occurred with:  Insidious onset.  Condition occurred: for unknown reasons.  This is a chronic condition  Patient reports onset of right sided ear pain in 2018 without a specific mechanism of injury or change in daily routine. Patient reports having this same pain 20 years ago but it went away on its own.    Patient reports pain:  TMJ left.  Radiates to:  Ear.  Pain is described as aching and sharp and is constant and reported as 8/10.  Associated symptoms:  Ear plugging, ear ache and vertigo. Pain is worse during the day.  Symptoms are exacerbated by opening and yawning and relieved by activity/movement.  Since onset symptoms are unchanged.  Special tests:  CT scan.  General health as reported by patient is good.                Red flags:  None as reported by the patient.    Objective:  Standing Alignment:    Cervical/Thoracic:  Forward head  Shoulder/UE:  Rounded shoulders       TMJ Evaluation  ROM:   AROM Cervical:    Flexion: WNL Overpressure: Pain:  Extension: WNL Overpressure: Pain:  Left Side Bend: 40 deg Overpressure: Pain:  Right Side Bend: 40 deg Overpressure: Pain:  Left Rotation: 45 deg Overpressure: Pain:  Right Rotation: 50 deg Overpressure: Pain:    Associated Findings: Headaches:  Cervical and temporal  Parafunctional Habits:    Bruxism:  Patient is not aware  Chewing/Biting Nails:  Gum occasionally    Re: Stephon Bryant   :   1945    Clenching:  Yes    Caffeine:  2 cups coffee per day    Aerobic  Exercise:  No  Sleep Quality:  Good    Palpation:    Right side tenderness present at:  Superficial Masseter; Deep Masseter and Temporalis    Movement Characteristics:    Click:  Patient reports occasional   Crepitus:  None  Deviations:  To the right at end range opening  Subluxation:  None    TMJ Findings:    Tongue Positioning:  Floor    Assessment/Plan:    Patient is a 73 year old male with right side TMJ complaints.    Patient has the following significant findings with corresponding treatment plan.                Diagnosis 1:  Right TMJD  Pain -  hot/cold therapy, manual therapy, self management, education and home program  Decreased joint mobility - manual therapy, therapeutic exercise, therapeutic activity and home program  Inflammation - cold therapy and self management/home program  Impaired muscle performance - neuro re-education and home program  Decreased function - therapeutic activities and home program  Impaired posture - neuro re-education, therapeutic activities and home program    Therapy Evaluation Codes:   1) History comprised of:   Personal factors that impact the plan of care:      None.    Comorbidity factors that impact the plan of care are:      None.     Medications impacting care: None.  2) Examination of Body Systems comprised of:   Body structures and functions that impact the plan of care:      TMJ.   Activity limitations that impact the plan of care are:      Chewing, yawning.  3) Clinical presentation characteristics are:   Stable/Uncomplicated.  4) Decision-Making    Low complexity using standardized patient assessment instrument and/or measureable assessment of functional outcome.  Cumulative Therapy Evaluation is: Low complexity.    Previous and current functional limitations:  (See Goal Flow Sheet for this information)    Short term and Long term goals: (See Goal Flow Sheet for this information)   Re: Stephon Bryant   :   1945    Communication ability:  Patient appears to be  able to clearly communicate and understand verbal and written communication and follow directions correctly.  Treatment Explanation - The following has been discussed with the patient:   RX ordered/plan of care  Anticipated outcomes  Possible risks and side effects  This patient would benefit from PT intervention to resume normal activities.   Rehab potential is good.    Frequency:  1 X week, once daily  Duration:  for 4 weeks  Discharge Plan:  Achieve all LTG.  Independent in home treatment program.  Reach maximal therapeutic benefit.    Please refer to the daily flowsheet for treatment today, total treatment time and time spent performing 1:1 timed codes.         Thank you for your referral.    INQUIRIES  Therapist: AARON Smith PT  [address]  [address]  [place] [address]  Phone: [phone]  Fax: [phone]

## 2019-02-13 ENCOUNTER — OFFICE VISIT (OUTPATIENT)
Dept: FAMILY MEDICINE | Facility: CLINIC | Age: 74
End: 2019-02-13
Payer: COMMERCIAL

## 2019-02-13 ENCOUNTER — TELEPHONE (OUTPATIENT)
Dept: FAMILY MEDICINE | Facility: CLINIC | Age: 74
End: 2019-02-13

## 2019-02-13 VITALS
SYSTOLIC BLOOD PRESSURE: 113 MMHG | DIASTOLIC BLOOD PRESSURE: 74 MMHG | OXYGEN SATURATION: 98 % | WEIGHT: 139 LBS | RESPIRATION RATE: 12 BRPM | TEMPERATURE: 97.8 F | HEART RATE: 81 BPM | BODY MASS INDEX: 21.77 KG/M2

## 2019-02-13 DIAGNOSIS — M10.9 GOUT, UNSPECIFIED CAUSE, UNSPECIFIED CHRONICITY, UNSPECIFIED SITE: Primary | ICD-10-CM

## 2019-02-13 PROCEDURE — 99213 OFFICE O/P EST LOW 20 MIN: CPT | Performed by: PHYSICIAN ASSISTANT

## 2019-02-13 RX ORDER — PREDNISONE 20 MG/1
TABLET ORAL
Qty: 15 TABLET | Refills: 0 | Status: SHIPPED | OUTPATIENT
Start: 2019-02-13 | End: 2019-02-18

## 2019-02-13 RX ORDER — NAPROXEN 500 MG/1
1 TABLET ORAL 2 TIMES DAILY
Qty: 30 TABLET | Refills: 0 | Status: SHIPPED | OUTPATIENT
Start: 2019-02-13 | End: 2019-08-27

## 2019-02-13 NOTE — PATIENT INSTRUCTIONS
Patient Education     Treating Gout Attacks     Raising the joint above the level of your heart can help reduce gout symptoms.     Gout is a disease that affects the joints. It is caused by excess uric acid in your blood that may lead to crystals forming in your joints. Left untreated, it can lead to painful foot and joint deformities and even kidney problems. But, by treating gout early, you can relieve pain and help prevent future problems. Gout can usually be treated with medicine and proper diet. In severe cases, surgery may be needed.  Gout attacks are painful and often happen more than once. Taking medicines may reduce pain and prevent attacks in the future. There are also some things you can do at home to relieve symptoms.  Medicines for gout  Your healthcare provider may prescribe a daily medicine to reduce levels of uric acid. Reducing your uric acid levels may help prevent gout attacks. Allopurinol is one commonly used medicine taken daily to reduce uric acid levels. Other daily medicines used to reduce uric acid levels include febuxostat, lesinurad, and probencid. Other medicines can help relieve pain and swelling during an acute attack. Medicines such as NSAIDs (nonsteroidal anti-inflammatory medicines), steroids, and colchicine may be prescribed for intermittent use to relieve an acute gout attack. Be sure to take your medicine as directed.  What you can do  Below are some things you can do at home to relieve gout symptoms. Your healthcare provider may have other tips.    Rest the painful joint as much as you can.    Raise the painful joint so it is at a level higher than your heart.    Use ice for 10 minutes every 1 to 2 hours as possible.  How can I prevent gout?  With a little effort, you may be able to prevent gout attacks in the future. Here are some things you can do:    Don't eat foods high in purines  ? Certain meats (red meat, processed meat, turkey)  ? Organ meats (kidney, liver,  sweetbread)  ? Shellfish (lobster, crab, shrimp, scallop, mussel)  ? Certain fish (anchovy, sardine, herring, mackerel)    Take any medicines prescribed by your healthcare provider.    Lose weight if you need to.    Reduce high fructose corn syrup in meals and drinks.    Reduce or cut out alcohol, particularly beer, but also red wine and spirits.    Control blood pressure, diabetes, and cholesterol.    Drink plenty of water to help flush uric acid from your body.  Date Last Reviewed: 4/1/2018 2000-2018 [employer]. [address] [place] [address]. All rights reserved. This information is not intended as a substitute for professional medical care. Always follow your healthcare professional's instructions.

## 2019-02-13 NOTE — PROGRESS NOTES
SUBJECTIVE:   Stephon Bryant is a 73 year old male who presents to clinic today for the following health issues:      Gout  Duration: x 4 days  Description   Location:lateral toes - right  Joint Swelling: no   Redness: YES  Pain intensity:  moderate  Accompanying signs and symptoms: None  History  Previous history of gout: no    Trauma to the area: no   Precipitating factors:   Alcohol usage: Occassional  Diuretic use: no   Recent illness: no   Therapies tried and outcome: massage   no trauma, 12 years ago had simal and they thought it was gout.     Doesn't routinely check sugars              Allergies   Allergen Reactions     No Clinical Screening - See Comments      No latex allergy     Penicillins Other (See Comments)     PN: rash     Proanthocyanidin      PN: face swell, rash hands     Shellfish-Derived Products      PN: shrimp, face swelling       Patient Active Problem List   Diagnosis     Hyperlipidemia LDL goal <100     History of basal cell carcinoma     Erectile dysfunction     Advance care planning     Gastroesophageal reflux disease without esophagitis     Persistent disorder of initiating or maintaining sleep     Type 2 diabetes mellitus without complication, without long-term current use of insulin (H)       No past medical history on file.      Current Outpatient Medications on File Prior to Visit:  aspirin 81 MG tablet Take 1 tablet by mouth daily *.    Bioflavonoid Products (BIOFLEX PO) Take 1 capful by mouth 1 capsule every other day.   Blood Pressure Monitor KIT As directed.   cholecalciferol (VITAMIN D3) 5000 UNITS CAPS capsule Take by mouth daily   FISH OIL 1 capsule every other day   lisinopril (PRINIVIL/ZESTRIL) 2.5 MG tablet Take 1 tablet (2.5 mg) by mouth daily for kidneys in diabetes.   Multiple Vitamins-Minerals (MULTIVITAMIN OR) 1 tab every other day   ONETOUCH VERIO IQ test strip use to test blood sugar 2 times daily or as directed   pravastatin (PRAVACHOL) 40 MG tablet Take 1 tablet (40  mg) by mouth every evening for cholesterol.   traZODone (DESYREL) 50 MG tablet Take 0.5-1 tablets (25-50 mg) by mouth nightly as needed for sleep   [] azithromycin (ZITHROMAX) 250 MG tablet 2 tablets on the first day, then 1 tablet daily for 4 more days.   meclizine (ANTIVERT) 25 MG tablet Take 1 tablet (25 mg) by mouth 3 times daily as needed for dizziness (Patient not taking: Reported on 2019)   [] ofloxacin (OCUFLOX) 0.3 % ophthalmic solution Place 5 drops into the right ear 4 times daily for 10 days   omeprazole (PRILOSEC) 20 MG CR capsule Take 1 capsule (20 mg) by mouth daily as needed for reflux. (Patient not taking: Reported on 2019)   [] predniSONE (DELTASONE) 20 MG tablet Take 1 tablet (20 mg) by mouth 2 times daily (with meals) for 6 days   tamsulosin (FLOMAX) 0.4 MG capsule Take 1 capsule (0.4 mg) by mouth daily for urine flow. (Patient not taking: Reported on 2019)     No current facility-administered medications on file prior to visit.     Social History     Tobacco Use     Smoking status: Former Smoker     Packs/day: 0.30     Years: 7.00     Pack years: 2.10     Types: Cigarettes     Smokeless tobacco: Never Used     Tobacco comment: [id number] (in service)   Substance Use Topics     Alcohol use: No     Drug use: No       ROS:   GEN: NO fevers  MUSC_ pain as above  SKIN- redness as above    OBJECTIVE:  /74   Pulse 81   Temp 97.8  F (36.6  C) (Oral)   Resp 12   Wt 63 kg (139 lb)   SpO2 98%   BMI 21.77 kg/m     General:   awake, alert, and cooperative.  NAD.   Head: Normocephalic, atraumatic.  Eyes: Conjunctiva clear,   Lungs: Regular rate  Neuro: Alert and oriented - normal speech.  MUSC_ rt lateral distal foot is red, tender    ASSESSMENT:    ICD-10-CM    1. Gout, unspecified cause, unspecified chronicity, unspecified site M10.9 naproxen (NAPROXEN) 500 MG EC tablet     predniSONE (DELTASONE) 20 MG tablet       PLAN:   Follow up: routine f/u with PRIMARY  CARE PROVIDER for DM  Advised about symptoms which might herald more serious problems.

## 2019-02-13 NOTE — TELEPHONE ENCOUNTER
Reason for Call:  Other prescription    Detailed comments: Needs clarification on medication just sent over. They think there may be a error with the EC part. Please call back and advise.     Phone Number Patient can be reached at:   [id number] - [place] - [address] (Pharmacy) [phone]     Best Time: any    Can we leave a detailed message on this number? YES    Call taken on 2/13/2019 at 12:40 PM by Leonora Kumar

## 2019-02-13 NOTE — TELEPHONE ENCOUNTER
Called and spoke with pharmacist from [facility] pharmacy, they are out of Naproxen EC and would like to know if regular Naproxen will be okay.  Huddled with Jony Crawley, and informed pharmacist that it was okay to dispense the regular naproxen.    Donya Corey MA on 2/13/2019 at 12:51 PM

## 2019-02-26 ENCOUNTER — OFFICE VISIT (OUTPATIENT)
Dept: FAMILY MEDICINE | Facility: CLINIC | Age: 74
End: 2019-02-26
Payer: COMMERCIAL

## 2019-02-26 VITALS
HEART RATE: 70 BPM | WEIGHT: 139 LBS | HEIGHT: 67 IN | SYSTOLIC BLOOD PRESSURE: 138 MMHG | RESPIRATION RATE: 18 BRPM | DIASTOLIC BLOOD PRESSURE: 79 MMHG | OXYGEN SATURATION: 100 % | BODY MASS INDEX: 21.82 KG/M2 | TEMPERATURE: 97.6 F

## 2019-02-26 DIAGNOSIS — R39.12 BENIGN PROSTATIC HYPERPLASIA WITH WEAK URINARY STREAM: ICD-10-CM

## 2019-02-26 DIAGNOSIS — M10.071 ACUTE IDIOPATHIC GOUT INVOLVING TOE OF RIGHT FOOT: ICD-10-CM

## 2019-02-26 DIAGNOSIS — E78.5 HYPERLIPIDEMIA LDL GOAL <100: ICD-10-CM

## 2019-02-26 DIAGNOSIS — K21.9 GASTROESOPHAGEAL REFLUX DISEASE WITHOUT ESOPHAGITIS: ICD-10-CM

## 2019-02-26 DIAGNOSIS — E11.9 TYPE 2 DIABETES MELLITUS WITHOUT COMPLICATION, WITHOUT LONG-TERM CURRENT USE OF INSULIN (H): Primary | ICD-10-CM

## 2019-02-26 DIAGNOSIS — N40.1 BENIGN PROSTATIC HYPERPLASIA WITH WEAK URINARY STREAM: ICD-10-CM

## 2019-02-26 LAB
ALT SERPL W P-5'-P-CCNC: 29 U/L (ref 0–70)
ANION GAP SERPL CALCULATED.3IONS-SCNC: 8 MMOL/L (ref 3–14)
BUN SERPL-MCNC: 13 MG/DL (ref 7–30)
CALCIUM SERPL-MCNC: 8.3 MG/DL (ref 8.5–10.1)
CHLORIDE SERPL-SCNC: 103 MMOL/L (ref 94–109)
CHOLEST SERPL-MCNC: 154 MG/DL
CO2 SERPL-SCNC: 28 MMOL/L (ref 20–32)
CREAT SERPL-MCNC: 0.8 MG/DL (ref 0.66–1.25)
CREAT UR-MCNC: 32 MG/DL
GFR SERPL CREATININE-BSD FRML MDRD: 89 ML/MIN/{1.73_M2}
GLUCOSE SERPL-MCNC: 118 MG/DL (ref 70–99)
HBA1C MFR BLD: 6.3 % (ref 0–5.6)
HDLC SERPL-MCNC: 49 MG/DL
LDLC SERPL CALC-MCNC: 83 MG/DL
MICROALBUMIN UR-MCNC: <5 MG/L
MICROALBUMIN/CREAT UR: NORMAL MG/G CR (ref 0–17)
NONHDLC SERPL-MCNC: 105 MG/DL
POTASSIUM SERPL-SCNC: 3.7 MMOL/L (ref 3.4–5.3)
SODIUM SERPL-SCNC: 139 MMOL/L (ref 133–144)
TRIGL SERPL-MCNC: 110 MG/DL
URATE SERPL-MCNC: 6.6 MG/DL (ref 3.5–7.2)

## 2019-02-26 PROCEDURE — 80048 BASIC METABOLIC PNL TOTAL CA: CPT | Performed by: FAMILY MEDICINE

## 2019-02-26 PROCEDURE — 99214 OFFICE O/P EST MOD 30 MIN: CPT | Performed by: FAMILY MEDICINE

## 2019-02-26 PROCEDURE — 82043 UR ALBUMIN QUANTITATIVE: CPT | Performed by: FAMILY MEDICINE

## 2019-02-26 PROCEDURE — 84550 ASSAY OF BLOOD/URIC ACID: CPT | Performed by: FAMILY MEDICINE

## 2019-02-26 PROCEDURE — 36415 COLL VENOUS BLD VENIPUNCTURE: CPT | Performed by: FAMILY MEDICINE

## 2019-02-26 PROCEDURE — 83036 HEMOGLOBIN GLYCOSYLATED A1C: CPT | Performed by: FAMILY MEDICINE

## 2019-02-26 PROCEDURE — 80061 LIPID PANEL: CPT | Performed by: FAMILY MEDICINE

## 2019-02-26 PROCEDURE — 84460 ALANINE AMINO (ALT) (SGPT): CPT | Performed by: FAMILY MEDICINE

## 2019-02-26 RX ORDER — PRAVASTATIN SODIUM 40 MG
40 TABLET ORAL EVERY EVENING
Qty: 90 TABLET | Refills: 1 | Status: SHIPPED | OUTPATIENT
Start: 2019-02-26 | End: 2019-08-27

## 2019-02-26 RX ORDER — TAMSULOSIN HYDROCHLORIDE 0.4 MG/1
0.4 CAPSULE ORAL DAILY
Qty: 90 CAPSULE | Refills: 1 | Status: SHIPPED | OUTPATIENT
Start: 2019-02-26 | End: 2019-08-27

## 2019-02-26 ASSESSMENT — PAIN SCALES - GENERAL: PAINLEVEL: NO PAIN (0)

## 2019-02-26 ASSESSMENT — MIFFLIN-ST. JEOR: SCORE: 1339.13

## 2019-02-26 NOTE — PATIENT INSTRUCTIONS
================================================================================  Normal Values   Blood pressure  <140/90 for most adults    <130/80 for some chronic diseases (ask your care team about yours)    BMI (body mass index)  18.5-25 kg/m2 (based on height and weight)     Thank you for visiting [facility]    Normal or non-critical lab and imaging results will be communicated to you by MyChart, letter or phone within 7 days.  If you do not hear from us within 10 days, please call the clinic. If you have a critical or abnormal lab result, we will notify you by phone as soon as possible.     If you have any questions regarding your visit please contact:     Team Comfort:   Clinic Hours Telephone Number   Dr. Fidencio Hough Dr. [place] 7am-5pm  Monday - Friday [phone]  Christiano RN  Fela RN  Saranya RN   Pharmacy 8:00am-8pm Monday-Friday    9am-5pm Saturday-Sunday [phone]   Urgent Care 11am-9pm Monday-Friday        9am-5pm Saturday-Sunday [phone]     After hours, weekend or if you need to make an appointment with your primary provider please call [phone].   After Hours nurse advise: call [facility]: [phone]    Medication Refills:  Call your pharmacy and they will forward the refill to us. Please allow 3 business days for your refills to be completed.

## 2019-02-26 NOTE — PROGRESS NOTES
SUBJECTIVE:   Stephon Bryant is a 74 year old male who presents to clinic today for the following health issues:      Diabetes Follow-up      Patient is checking blood sugars: not at all    Diabetic concerns: None     Symptoms of hypoglycemia (low blood sugar): none     Paresthesias (numbness or burning in feet) or sores: No     Date of last diabetic eye exam: 6/2/18    Patient believes that his DM is under good control, but he has not been checking his sugars. He notes that he has been exercising regularly, and watching his diet.   Diabetes Management Resources    Hyperlipidemia Follow-Up      Rate your low fat/cholesterol diet?: good    Taking statin?  Yes, no muscle aches from statin    Other lipid medications/supplements?:  Fish oil/Omega 3, dose without side effects    Hypertension Follow-up      Outpatient blood pressures are not being checked.    Low Salt Diet: no added salt    BP Readings from Last 2 Encounters:   02/26/19 138/79   02/13/19 113/74     Hemoglobin A1C (%)   Date Value   02/26/2019 6.3 (H)   07/23/2018 5.7 (H)     LDL Cholesterol Calculated (mg/dL)   Date Value   07/23/2018 81   01/22/2018 78     Gout:  Patient was seen on 2/13/19 for gout in his right foot and was prescribed naproxen and prednisone at that time. He was advised to follow up with his PCP and the patient mentions he was told to ask for uric acid labs. He notes that the pain has mostly resolved, but when he wakes in the morning he feels a slight stinging, but this resolves quickly. Patient reports that he had gout once before about 10-15 years ago. He states that lately he was traveling, eating more red meat, and drinking more beer than usual.       Amount of exercise or physical activity: 2-3 days/week for an average of 30-45 minutes    Problems taking medications regularly: No    Medication side effects: none    Diet: low fat/cholesterol        Past medical, family, and social histories, medications, and allergies are reviewed  "and updated in Epic.    ROS:  Constitutional, HEENT, cardiovascular, pulmonary, gi and gu systems are negative, except as otherwise noted.    This document serves as a record of the services and decisions personally performed by ELBERT NIXON. It was created on his/her behalf by Sujata Pagan, a trained medical scribe. The creation of this document is based on the provider's statements to the medical scribe. Sujata Pagan, February 26, 2019 9:18 AM    OBJECTIVE:     /79   Pulse 70   Temp 97.6  F (36.4  C) (Oral)   Resp 18   Ht 1.702 m (5' 7\")   Wt 63 kg (139 lb)   SpO2 100%   BMI 21.77 kg/m    Body mass index is 21.77 kg/m .  GENERAL: healthy, alert and no distress  EYES: Eyes grossly normal to inspection, PERRL, EOMI, sclerae white and conjunctivae normal  RESP: lungs clear to auscultation - no crackles or wheezes, no areas of dullness, no tachypnea  CV: Heart regular rate and rhythm without murmur, click or rub. No peripheral edema and peripheral pulses strong  MS: no gross musculoskeletal defects noted, no edema  SKIN: no suspicious lesions or rashes  NEURO: Normal strength and tone, sensory exam grossly normal, mentation intact, oriented times 3 and cranial nerves 2-12 intact  PSYCH: mentation appears normal, affect normal/bright    Diagnostic Test Results:  Results for orders placed or performed in visit on 02/26/19 (from the past 24 hour(s))   Hemoglobin A1c   Result Value Ref Range    Hemoglobin A1C 6.3 (H) 0 - 5.6 %       ASSESSMENT/PLAN:     (E11.9) Type 2 diabetes mellitus without complication, without long-term current use of insulin (H)  (primary encounter diagnosis)  Comment: Well controlled (diet)  Plan: Hemoglobin A1c, Albumin Random Urine         Quantitative with Creat Ratio, Basic metabolic         panel        Return in about 5 months (around 7/23/2019) for Physical Exam, Diabetes.      (E78.5) Hyperlipidemia LDL goal <100  Comment: Fasting   Plan: Lipid panel reflex to direct LDL " Non-fasting,         ALT, pravastatin (PRAVACHOL) 40 MG tablet            (K21.9) Gastroesophageal reflux disease without esophagitis  Comment: Prescription update.   Plan: omeprazole (PRILOSEC) 20 MG DR capsule            (N40.1,  R39.12) Benign prostatic hyperplasia with weak urinary stream  Comment: Prescription update.  Plan: tamsulosin (FLOMAX) 0.4 MG capsule            (M10.071) Acute idiopathic gout involving toe of right foot  Comment: If he does have gout, his gout flares have been very infrequent. This lab will confirm the diagnosis, but he does not necessarily need additional medication.  Plan: Uric acid        Follow up prn.      The information in this document, created by the medical scribchloe Pagan for me, accurately reflects the services I personally performed and the decisions made by me. I have reviewed and approved this document for accuracy prior to leaving the patient care area.    Fidencio Cote MD  [facility]

## 2019-04-09 ENCOUNTER — OFFICE VISIT (OUTPATIENT)
Dept: PEDIATRICS | Facility: CLINIC | Age: 74
End: 2019-04-09
Payer: COMMERCIAL

## 2019-04-09 VITALS
DIASTOLIC BLOOD PRESSURE: 81 MMHG | HEART RATE: 74 BPM | TEMPERATURE: 97.4 F | BODY MASS INDEX: 22.4 KG/M2 | WEIGHT: 143 LBS | OXYGEN SATURATION: 96 % | SYSTOLIC BLOOD PRESSURE: 124 MMHG

## 2019-04-09 DIAGNOSIS — J06.9 VIRAL URI WITH COUGH: Primary | ICD-10-CM

## 2019-04-09 PROCEDURE — 99213 OFFICE O/P EST LOW 20 MIN: CPT | Performed by: FAMILY MEDICINE

## 2019-04-09 RX ORDER — BENZONATATE 100 MG/1
100 CAPSULE ORAL 3 TIMES DAILY PRN
Qty: 42 CAPSULE | Refills: 0 | Status: SHIPPED | OUTPATIENT
Start: 2019-04-09 | End: 2019-04-22

## 2019-04-09 RX ORDER — AZITHROMYCIN 250 MG/1
TABLET, FILM COATED ORAL
Qty: 6 TABLET | Refills: 0 | Status: SHIPPED | OUTPATIENT
Start: 2019-04-09 | End: 2019-04-22

## 2019-04-09 RX ORDER — FLUTICASONE PROPIONATE 50 MCG
1 SPRAY, SUSPENSION (ML) NASAL DAILY
Qty: 16 G | Refills: 0 | Status: SHIPPED | OUTPATIENT
Start: 2019-04-09 | End: 2019-08-27

## 2019-04-09 ASSESSMENT — PAIN SCALES - GENERAL: PAINLEVEL: NO PAIN (0)

## 2019-04-09 NOTE — PATIENT INSTRUCTIONS
Patient Education     Adult Self-Care for Colds    Colds are caused by viruses. They can't be cured with antibiotics. However, you can ease symptoms and support your body's efforts to heal itself. No matter which symptoms you have, be sure to:    Drink plenty of fluids (water or clear soup)    Stop smoking and drinking alcohol    Get plenty of rest  Understand a fever    Take your temperature several times a day. If your fever is 100.4 F (38.0 C) for more than a day, call your healthcare provider.    Relax, lie down. Go to bed if you want. Just get off your feet and rest. Also, drink plenty of fluids to avoid dehydration.    Take acetaminophen or a nonsteroidal anti-inflammatory agent (NSAID), such as ibuprofen.  Treat a troubled nose kindly    Breathe steam or heated humidified air to open blocked nasal passages.  a hot shower or use a vaporizer. Be careful not to get burned by the steam.    Saline nasal sprays and decongestant tablets help open a stuffy nose. Antihistamines can also help, but they can cause side effects such as drowsiness and drying of the eyes, nose, and mouth.  Soothe a sore throat and cough    Gargle every 2 hours with 1/4 teaspoon of salt dissolved in 1/2 cup of warm water. Suck on throat lozenges and cough drops to moisten your throat.    Cough medicines are available but it is unclear how well they actually work.    Take acetaminophen or an NSAID, such as ibuprofen, to ease throat pain  Ease digestive problems    Put fluids back into your body. Take frequent sips of clear liquids such as water or broth. Avoid drinks that have a lot of sugar in them, such as juices and sodas. These can make diarrhea worse. Older children and adults can drink sports drinks.    As your appetite returns, you can resume your normal diet. Ask your healthcare provider if there are any foods you should avoid.  When to seek medical care  When you first notice symptoms, ask your healthcare provider if  antiviral medicines are appropriate. Antibiotics should not be taken for colds or flu. Also, call your healthcare provider if you have any of the following symptoms or if you aren't feeling better after 7 days:    Shortness of breath    Pain or pressure in the chest or belly (abdomen)    Worsening symptoms, especially after a period of improvement    Fever of 100.4 F  (38.0 C) or higher, or fever that doesn't go down with medicine    Sudden dizziness or confusion    Severe or continued vomiting    Signs of dehydration, including extreme thirst, dark urine, infrequent urination, dry mouth    Spotted, red, or very sore throat   Date Last Reviewed: 12/1/2016 2000-2018 [employer] [address] [place] [address], [place] [address]. All rights reserved. This information is not intended as a substitute for professional medical care. Always follow your healthcare professional's instructions.

## 2019-04-09 NOTE — PROGRESS NOTES
SUBJECTIVE:   Stephon Bryant is a 74 year old male who presents to clinic today for the following   health issues:      RESPIRATORY SYMPTOMS      Duration: 5-7 days    Description  cough    Severity: moderate    Accompanying signs and symptoms: throat irritation, chest sore from coughing    History (predisposing factors):  none    Precipitating or alleviating factors: None    Therapies tried and outcome:  OTC cough medicine      Additional history: as documented    Reviewed  and updated as needed this visit by clinical staff         Reviewed and updated as needed this visit by Provider  Med Hx  Surg Hx  Fam Hx         Patient Active Problem List   Diagnosis     Hyperlipidemia LDL goal <100     History of basal cell carcinoma     Erectile dysfunction     Advance care planning     Gastroesophageal reflux disease without esophagitis     Persistent disorder of initiating or maintaining sleep     Type 2 diabetes mellitus without complication, without long-term current use of insulin (H)     Past Surgical History:   Procedure Laterality Date     COLONOSCOPY       COLONOSCOPY  12/18/2013    Procedure: COMBINED COLONOSCOPY, SINGLE BIOPSY/POLYPECTOMY BY BIOPSY;  Cecal polyp-X 1, removed with cold biopsy forceps.;  Surgeon: Duane, William Charles, MD;  Location: [facility] OR     ESOPHAGOSCOPY, GASTROSCOPY, DUODENOSCOPY (EGD), COMBINED  12/18/2013    Procedure: COMBINED ESOPHAGOSCOPY, GASTROSCOPY, DUODENOSCOPY (EGD), BIOPSY SINGLE OR MULTIPLE;  Duodenum BX were taken with cold biopsy forceps to evaluate for spru.;  Surgeon: Duane, William Charles, MD;  Location: [facility] OR     PROSTATE BIOPSY, NEEDLE, SATURATION SAMPLING  pre-2005    [facility] for elevated PSA       Social History     Tobacco Use     Smoking status: Former Smoker     Packs/day: 0.30     Years: 7.00     Pack years: 2.10     Types: Cigarettes     Smokeless tobacco: Never Used     Tobacco comment: [id number] (in service)   Substance Use Topics     Alcohol use: No      Family History   Problem Relation Age of Onset     Heart Disease Father      Diabetes Sister      Cancer Brother 64        lymphoma?     Breast Cancer Sister      Prostate Cancer No family hx of         unk     Hypertension No family hx of      Cerebrovascular Disease No family hx of          Current Outpatient Medications   Medication Sig Dispense Refill     azithromycin (ZITHROMAX) 250 MG tablet Take 2 tablets (500 mg) by mouth daily for 1 day, THEN 1 tablet (250 mg) daily for 4 days. 6 tablet 0     benzonatate (TESSALON) 100 MG capsule Take 1 capsule (100 mg) by mouth 3 times daily as needed for cough 42 capsule 0     FISH OIL 1 capsule every other day       fluticasone (FLONASE) 50 MCG/ACT nasal spray Spray 1 spray into both nostrils daily 16 g 0     pravastatin (PRAVACHOL) 40 MG tablet Take 1 tablet (40 mg) by mouth every evening for cholesterol. 90 tablet 1     traZODone (DESYREL) 50 MG tablet Take 0.5-1 tablets (25-50 mg) by mouth nightly as needed for sleep 90 tablet 1     aspirin 81 MG tablet Take 1 tablet by mouth daily *.        Bioflavonoid Products (BIOFLEX PO) Take 1 capful by mouth 1 capsule every other day.       Blood Pressure Monitor KIT As directed.       cholecalciferol (VITAMIN D3) 5000 UNITS CAPS capsule Take by mouth daily       lisinopril (PRINIVIL/ZESTRIL) 2.5 MG tablet Take 1 tablet (2.5 mg) by mouth daily for kidneys in diabetes. (Patient not taking: Reported on 4/9/2019) 90 tablet 3     meclizine (ANTIVERT) 25 MG tablet Take 1 tablet (25 mg) by mouth 3 times daily as needed for dizziness (Patient not taking: Reported on 4/9/2019) 60 tablet 0     Multiple Vitamins-Minerals (MULTIVITAMIN OR) 1 tab every other day       naproxen (NAPROXEN) 500 MG EC tablet Take 1 tablet by mouth 2 times daily (Patient not taking: Reported on 4/9/2019) 30 tablet 0     omeprazole (PRILOSEC) 20 MG DR capsule Take 1 capsule (20 mg) by mouth daily as needed for reflux. (Patient not taking: Reported on 4/9/2019)  90 capsule 1     ONETOUCH VERIO IQ test strip use to test blood sugar 2 times daily or as directed (Patient not taking: Reported on 4/9/2019) 100 each 2     tamsulosin (FLOMAX) 0.4 MG capsule Take 1 capsule (0.4 mg) by mouth daily for urine flow. (Patient not taking: Reported on 4/9/2019) 90 capsule 1       ROS:  Constitutional, HEENT, cardiovascular, pulmonary, gi and gu systems are negative, except as otherwise noted.    OBJECTIVE:     /81 (BP Location: Right arm, Patient Position: Sitting, Cuff Size: Adult Regular)   Pulse 74   Temp 97.4  F (36.3  C) (Oral)   Wt 64.9 kg (143 lb)   SpO2 96%   BMI 22.40 kg/m    Body mass index is 22.4 kg/m .   GENERAL: healthy, alert and no distress  EYES: Eyes grossly normal to inspection, PERRL and conjunctivae and sclerae normal  HENT: ear canals and bilateral scarring from previous ear injury, nose and mouth without ulcers or lesions  NECK: no adenopathy, no asymmetry, masses, or scars and thyroid normal to palpation  RESP: lungs clear to auscultation - no rales, rhonchi or wheezes  CV: regular rate and rhythm, normal S1 S2, no S3 or S4, no murmur, click or rub, no peripheral edema and peripheral pulses strong  ABDOMEN: soft, nontender, no hepatosplenomegaly, no masses and bowel sounds normal        ASSESSMENT:   Stephon was seen today for cough.    Diagnoses and all orders for this visit:    Viral URI with cough  -     fluticasone (FLONASE) 50 MCG/ACT nasal spray; Spray 1 spray into both nostrils daily  -     benzonatate (TESSALON) 100 MG capsule; Take 1 capsule (100 mg) by mouth 3 times daily as needed for cough  -     azithromycin (ZITHROMAX) 250 MG tablet; Take 2 tablets (500 mg) by mouth daily for 1 day, THEN 1 tablet (250 mg) daily for 4 days.        PLAN:   Orders per epic care  Symptomatic therapy suggested: push fluids, rest, gargle warm salt water, use vaporizer or mist needed  and use acetaminophen, ibuprofen as needed. Call or return to clinic if symptoms  worsen or persist.    See Patient Instructions    William Belle MD  [facility]

## 2019-04-22 ENCOUNTER — ANCILLARY PROCEDURE (OUTPATIENT)
Dept: GENERAL RADIOLOGY | Facility: CLINIC | Age: 74
End: 2019-04-22
Attending: PHYSICIAN ASSISTANT
Payer: COMMERCIAL

## 2019-04-22 ENCOUNTER — OFFICE VISIT (OUTPATIENT)
Dept: FAMILY MEDICINE | Facility: CLINIC | Age: 74
End: 2019-04-22
Payer: COMMERCIAL

## 2019-04-22 VITALS
RESPIRATION RATE: 18 BRPM | OXYGEN SATURATION: 98 % | SYSTOLIC BLOOD PRESSURE: 121 MMHG | HEIGHT: 67 IN | TEMPERATURE: 97.5 F | BODY MASS INDEX: 22.29 KG/M2 | HEART RATE: 61 BPM | WEIGHT: 142 LBS | DIASTOLIC BLOOD PRESSURE: 73 MMHG

## 2019-04-22 DIAGNOSIS — R05.9 COUGH: ICD-10-CM

## 2019-04-22 DIAGNOSIS — L98.9 SKIN LESION: Primary | ICD-10-CM

## 2019-04-22 DIAGNOSIS — N52.9 ERECTILE DYSFUNCTION, UNSPECIFIED ERECTILE DYSFUNCTION TYPE: ICD-10-CM

## 2019-04-22 PROCEDURE — 99214 OFFICE O/P EST MOD 30 MIN: CPT | Performed by: PHYSICIAN ASSISTANT

## 2019-04-22 PROCEDURE — 71046 X-RAY EXAM CHEST 2 VIEWS: CPT

## 2019-04-22 RX ORDER — SILDENAFIL 100 MG/1
TABLET, FILM COATED ORAL
Qty: 10 TABLET | Refills: 1 | Status: SHIPPED | OUTPATIENT
Start: 2019-04-22

## 2019-04-22 ASSESSMENT — PAIN SCALES - GENERAL: PAINLEVEL: NO PAIN (0)

## 2019-04-22 ASSESSMENT — MIFFLIN-ST. JEOR: SCORE: 1352.74

## 2019-04-22 NOTE — PROGRESS NOTES
SUBJECTIVE:   Stephon Bryant is a 74 year old male who presents to clinic today for the following   health issues:      Concern - Derm  Onset: 1 year, turns red and swells on and off,  But no drainage,     Description:   Patient complains of lump below the fingernail of right hand middle finger. Patient gardens and wonders if related to possible splinter/wood in the area from a puncture.    Intensity: mild    Progression of Symptoms:  same    Accompanying Signs & Symptoms:  none    Previous history of similar problem:   none    Precipitating factors:   Worsened by: none    Alleviating factors:  Improved by: none    Therapies Tried and outcome: none      Medication Followup of Antibiotic for 3 weeks for cough, flonase, tessalon and zpack    Taking Medication as prescribed: yes    Side Effects:  None    Medication Helping Symptoms:  Yes a little bit but not completely subsided      Insurance didnt cover tessalon but over the counter seems to help.  No sinus issues.      req viagra refill            Allergies   Allergen Reactions     No Clinical Screening - See Comments      No latex allergy     Penicillins Other (See Comments)     PN: rash     Proanthocyanidin      PN: face swell, rash hands     Shellfish-Derived Products      PN: shrimp, face swelling       Patient Active Problem List   Diagnosis     Hyperlipidemia LDL goal <100     History of basal cell carcinoma     Erectile dysfunction     Advance care planning     Gastroesophageal reflux disease without esophagitis     Persistent disorder of initiating or maintaining sleep     Type 2 diabetes mellitus without complication, without long-term current use of insulin (H)       No past medical history on file.      Current Outpatient Medications on File Prior to Visit:  aspirin 81 MG tablet Take 1 tablet by mouth daily *.    Bioflavonoid Products (BIOFLEX PO) Take 1 capful by mouth 1 capsule every other day.   Blood Pressure Monitor KIT As directed.   cholecalciferol  "(VITAMIN D3) 5000 UNITS CAPS capsule Take by mouth daily   FISH OIL 1 capsule every other day   fluticasone (FLONASE) 50 MCG/ACT nasal spray Spray 1 spray into both nostrils daily   lisinopril (PRINIVIL/ZESTRIL) 2.5 MG tablet Take 1 tablet (2.5 mg) by mouth daily for kidneys in diabetes.   meclizine (ANTIVERT) 25 MG tablet Take 1 tablet (25 mg) by mouth 3 times daily as needed for dizziness   Multiple Vitamins-Minerals (MULTIVITAMIN OR) 1 tab every other day   naproxen (NAPROXEN) 500 MG EC tablet Take 1 tablet by mouth 2 times daily   omeprazole (PRILOSEC) 20 MG DR capsule Take 1 capsule (20 mg) by mouth daily as needed for reflux.   ONETOUCH VERIO IQ test strip use to test blood sugar 2 times daily or as directed   pravastatin (PRAVACHOL) 40 MG tablet Take 1 tablet (40 mg) by mouth every evening for cholesterol.   tamsulosin (FLOMAX) 0.4 MG capsule Take 1 capsule (0.4 mg) by mouth daily for urine flow.   traZODone (DESYREL) 50 MG tablet Take 0.5-1 tablets (25-50 mg) by mouth nightly as needed for sleep     No current facility-administered medications on file prior to visit.     Social History     Tobacco Use     Smoking status: Former Smoker     Packs/day: 0.30     Years: 7.00     Pack years: 2.10     Types: Cigarettes     Smokeless tobacco: Never Used     Tobacco comment: [id number] (in service)   Substance Use Topics     Alcohol use: No       Family History   Problem Relation Age of Onset     Heart Disease Father      Diabetes Sister      Cancer Brother 64        lymphoma?     Breast Cancer Sister      Prostate Cancer No family hx of         unk     Hypertension No family hx of      Cerebrovascular Disease No family hx of        ROS:  Consitutional: As above  ENT: As above  Respiratory: As above    OBJECTIVE:  /73 (BP Location: Right arm, Patient Position: Chair, Cuff Size: Adult Regular)   Pulse 61   Temp 97.5  F (36.4  C) (Oral)   Resp 18   Ht 1.702 m (5' 7\")   Wt 64.4 kg (142 lb)   SpO2 98%   BMI " 22.24 kg/m    GENERAL APPEARANCE: healthy, alert and no distress  EYES: conjunctiva clear  HENT:   TMs w/o erythema, effusion or bulging.  Nose and mouth without ulcers, erythema or lesions.  NO tonsillar enlargement erythema or exudates.   NECK: supple, nontender, no lymphadenopathy  RESP: lungs clear to auscultation - no rales, rhonchi or wheezes  CV: regular rates and rhythm, normal S1 S2, no murmur noted  NEURO: awake, alert      My independent read of XR is WNL      ASSESSMENT: Well appearing. Bronchitis, already tx;d with ABXs    ICD-10-CM    1. Skin lesion L98.9 DERMATOLOGY REFERRAL   2. Cough R05 XR Chest 2 Views   3. Erectile dysfunction, unspecified erectile dysfunction type N52.9 sildenafil (VIAGRA) 100 MG tablet         PLAN:  Lots of rest and fluids.  RTC if any worsening symptoms or if not improving.    Jony Crawley PA-C

## 2019-04-22 NOTE — PATIENT INSTRUCTIONS
At [facility], we strive to deliver an exceptional experience to you, every time we see you.  If you receive a survey in the mail, please send us back your thoughts. We really do value your feedback.    Based on your medical history, these are the current health maintenance/preventive care services that you are due for (some may have been done at this visit.)  Health Maintenance Due   Topic Date Due     ZOSTER IMMUNIZATION (2 of 3) 12/12/2013     DIABETIC EDUCATION Q1 YEAR  12/15/2017     FOOT EXAM Q1 YEAR  01/22/2019         Suggested websites for health information:  Www.EverCharge.[employer] : Up to date and easily searchable information on multiple topics.  Www.PsychologyOnline.gov : medication info, interactive tutorials, watch real surgeries online  Www.familydoctor.org : good info from the Academy of Family Physicians  Www.cdc.gov : public health info, travel advisories, epidemics (H1N1)  Www.aap.org : children's health info, normal development, vaccinations  Www.health.[facility].[place].us : [place] dept of health, public health issues in [place], N1N1    Your care team:                            Family Medicine Internal Medicine   MD Tripp Wilde MD Shantel Branch-Fleming, MD Katya Georgiev PA-C Nam Ho, MD Pediatrics   LIZZETH Garzon, SAMANTHA Villalobos APRN CNP   MD Desire Brown MD Deborah Mielke, MD Kim Thein, APRN [facility]      Clinic hours: Monday - Thursday 7 am-7 pm; Fridays 7 am-5 pm.   Urgent care: Monday - Friday 11 am-9 pm; Saturday and Sunday 9 am-5 pm.  Pharmacy : Monday -Thursday 8 am-8 pm; Friday 8 am-6 pm; Saturday and Sunday 9 am-5 pm.     Clinic: [phone]   Pharmacy: [phone]  Acute Bronchitis  Your health care provider has told you that you have acute bronchitis. Bronchitis is infection or inflammation of the bronchial tubes (airways in the lungs). Normally, air moves easily in and out of the airways. Bronchitis narrows the  airways, making it harder for air to flow in and out of the lungs. This causes symptoms such as shortness of breath, coughing, and wheezing. Bronchitis can be  acute  or  chronic.  Acute means the condition comes on quickly and goes away in a short time. Chronic means a condition lasts a long time and often comes back. Read on to learn more about acute bronchitis.  What Causes Acute Bronchitis?  Acute bronchitis almost always starts as a viral respiratory infection, such as a cold or the flu. Certain factors make it more likely for a cold or flu to turn into bronchitis. These include being very young or very old or having a heart or lung problem. Cigarette smoking also makes bronchitis more likely.  When bronchitis develops, the airways become swollen. The airways may also become infected with bacteria. This is known as a secondary infection.  Diagnosing Acute Bronchitis  Your health care provider will examine you and ask about your symptoms and health history. You may also have a sputum culture to test the fluid in your lungs. Chest X-rays may be done to look for infection in the lungs.  Treating Acute Bronchitis  Bronchitis usually clears up as the cold or flu goes away. You can help feel better faster by doing the following:  Take medication as directed. You may be told to take ibuprofen or other over-the-counter medications. These help relieve inflammation in your bronchial tubes. Your doctor may prescribe an inhaler to help open up the bronchial tubes. If you have a bacterial infection, you may be prescribed antibiotics. If so, take all of this medication as directed until it is gone, even if you feel better.  Drink plenty of fluids, such as water, juice, or warm soup. Fluids loosen mucus so that you can cough it up. This helps you breathe more easily. Fluids also prevent dehydration.  Make sure you get plenty of rest.  Do not smoke. Do not allow anyone else to smoke in your home.  Recovery and  Follow-Up  Follow up with your doctor as you are told. You will likely feel better in a week or two. But a dry cough can linger beyond that time. Let your doctor know if you still have symptoms (other than a dry cough) after 2 weeks. If you re prone to getting bronchial infections, let your doctor know. And take steps to protect yourself from future infections. These steps include stopping smoking and avoiding tobacco smoke, washing your hands often, and getting a yearly flu shot.  When to Call the Doctor  Call the doctor if you have any of the following:  Fever of 100.4 F (38.0 C) higher  Symptoms that get worse, or new symptoms  Trouble breathing  Symptoms that don t start to improve within a week, or within 3 days of taking antibiotics     [id number] Júnior Gifford, [address] [place] [address], [place] [address]. All rights reserved. This information is not intended as a substitute for professional medical care. Always follow your healthcare professional's instructions.        \

## 2019-08-27 ENCOUNTER — OFFICE VISIT (OUTPATIENT)
Dept: FAMILY MEDICINE | Facility: CLINIC | Age: 74
End: 2019-08-27
Payer: COMMERCIAL

## 2019-08-27 VITALS
TEMPERATURE: 98.1 F | RESPIRATION RATE: 12 BRPM | HEART RATE: 68 BPM | WEIGHT: 141 LBS | HEIGHT: 67 IN | DIASTOLIC BLOOD PRESSURE: 83 MMHG | SYSTOLIC BLOOD PRESSURE: 130 MMHG | BODY MASS INDEX: 22.13 KG/M2 | OXYGEN SATURATION: 98 %

## 2019-08-27 DIAGNOSIS — E11.9 TYPE 2 DIABETES MELLITUS WITHOUT COMPLICATION, WITHOUT LONG-TERM CURRENT USE OF INSULIN (H): ICD-10-CM

## 2019-08-27 DIAGNOSIS — E78.5 HYPERLIPIDEMIA LDL GOAL <100: ICD-10-CM

## 2019-08-27 DIAGNOSIS — K21.9 GASTROESOPHAGEAL REFLUX DISEASE WITHOUT ESOPHAGITIS: ICD-10-CM

## 2019-08-27 DIAGNOSIS — N40.1 BENIGN PROSTATIC HYPERPLASIA WITH WEAK URINARY STREAM: ICD-10-CM

## 2019-08-27 DIAGNOSIS — R39.12 BENIGN PROSTATIC HYPERPLASIA WITH WEAK URINARY STREAM: ICD-10-CM

## 2019-08-27 DIAGNOSIS — Z00.00 ENCOUNTER FOR MEDICARE ANNUAL WELLNESS EXAM: Primary | ICD-10-CM

## 2019-08-27 DIAGNOSIS — Z12.11 COLON CANCER SCREENING: ICD-10-CM

## 2019-08-27 LAB
ALT SERPL W P-5'-P-CCNC: 30 U/L (ref 0–70)
CHOLEST SERPL-MCNC: 134 MG/DL
CREAT SERPL-MCNC: 0.82 MG/DL (ref 0.66–1.25)
GFR SERPL CREATININE-BSD FRML MDRD: 88 ML/MIN/{1.73_M2}
HBA1C MFR BLD: 6 % (ref 0–5.6)
HDLC SERPL-MCNC: 47 MG/DL
LDLC SERPL CALC-MCNC: 71 MG/DL
NONHDLC SERPL-MCNC: 87 MG/DL
POTASSIUM SERPL-SCNC: 3.8 MMOL/L (ref 3.4–5.3)
TRIGL SERPL-MCNC: 82 MG/DL

## 2019-08-27 PROCEDURE — 84460 ALANINE AMINO (ALT) (SGPT): CPT | Performed by: FAMILY MEDICINE

## 2019-08-27 PROCEDURE — 80061 LIPID PANEL: CPT | Performed by: FAMILY MEDICINE

## 2019-08-27 PROCEDURE — 36415 COLL VENOUS BLD VENIPUNCTURE: CPT | Performed by: FAMILY MEDICINE

## 2019-08-27 PROCEDURE — 82565 ASSAY OF CREATININE: CPT | Performed by: FAMILY MEDICINE

## 2019-08-27 PROCEDURE — 99213 OFFICE O/P EST LOW 20 MIN: CPT | Mod: 25 | Performed by: FAMILY MEDICINE

## 2019-08-27 PROCEDURE — G0439 PPPS, SUBSEQ VISIT: HCPCS | Performed by: FAMILY MEDICINE

## 2019-08-27 PROCEDURE — 83036 HEMOGLOBIN GLYCOSYLATED A1C: CPT | Performed by: FAMILY MEDICINE

## 2019-08-27 PROCEDURE — 84132 ASSAY OF SERUM POTASSIUM: CPT | Performed by: FAMILY MEDICINE

## 2019-08-27 RX ORDER — TAMSULOSIN HYDROCHLORIDE 0.4 MG/1
0.4 CAPSULE ORAL DAILY
Qty: 90 CAPSULE | Refills: 3 | Status: SHIPPED | OUTPATIENT
Start: 2019-08-27

## 2019-08-27 RX ORDER — LISINOPRIL 2.5 MG/1
2.5 TABLET ORAL DAILY
Qty: 90 TABLET | Refills: 1 | Status: SHIPPED | OUTPATIENT
Start: 2019-08-27

## 2019-08-27 RX ORDER — PRAVASTATIN SODIUM 40 MG
40 TABLET ORAL EVERY EVENING
Qty: 90 TABLET | Refills: 1 | Status: SHIPPED | OUTPATIENT
Start: 2019-08-27 | End: 2020-04-30

## 2019-08-27 ASSESSMENT — PAIN SCALES - GENERAL: PAINLEVEL: NO PAIN (0)

## 2019-08-27 ASSESSMENT — MIFFLIN-ST. JEOR: SCORE: 1348.2

## 2019-08-27 NOTE — PATIENT INSTRUCTIONS
Patient Education   Personalized Prevention Plan  You are due for the preventive services outlined below.  Your care team is available to assist you in scheduling these services.  If you have already completed any of these items, please share that information with your care team to update in your medical record.  Health Maintenance Due   Topic Date Due     Zoster (Shingles) Vaccine (2 of 3) 12/12/2013     Diabetic Foot Exam  01/22/2019     Eye Exam  06/02/2019     Annual Wellness Visit  07/23/2019     FALL RISK ASSESSMENT  07/23/2019     A1C Lab  08/26/2019         At [facility], we strive to deliver an exceptional experience to you, every time we see you.  If you receive a survey in the mail, please send us back your thoughts. We really do value your feedback.    Based on your medical history, these are the current health maintenance/preventive care services that you are due for (some may have been done at this visit.)  Health Maintenance Due   Topic Date Due     ZOSTER IMMUNIZATION (2 of 3) 12/12/2013     DIABETIC FOOT EXAM  01/22/2019     EYE EXAM  06/02/2019     MEDICARE ANNUAL WELLNESS VISIT  07/23/2019     FALL RISK ASSESSMENT  07/23/2019     A1C  08/26/2019         Suggested websites for health information:  Www.Transcarga.pe.[employer] : Up to date and easily searchable information on multiple topics.  Www.medlineplus.gov : medication info, interactive tutorials, watch real surgeries online  Www.familydoctor.org : good info from the Academy of Family Physicians  Www.cdc.gov : public health info, travel advisories, epidemics (H1N1)  Www.aap.org : children's health info, normal development, vaccinations  Www.health.state[place]us : [place] dept of health, public health issues in [place], N1N1    Your care team:                            Family Medicine Internal Medicine   MD Tripp Wilde MD Shantel Branch-Fleming, MD Katya Georgiev PA-C Nam Ho, MD Pediatrics   LIZZETH Garzon  SAMANTHA Segura CNP, MD Bethany Templen, MD Deborah Mielke, MD Kim Thein, APRN CNP      Clinic hours: Monday - Thursday 7 am-7 pm; Fridays 7 am-5 pm.   Urgent care: Monday - Friday 11 am-9 pm; Saturday and Sunday 9 am-5 pm.  Pharmacy : Monday -Thursday 8 am-8 pm; Friday 8 am-6 pm; Saturday and Sunday 9 am-5 pm.     Clinic: [phone]   Pharmacy: [phone]

## 2019-08-27 NOTE — PROGRESS NOTES
"  SUBJECTIVE:   Stephon Bryant is a 74 year old male who presents for Preventive Visit.  Are you in the first 12 months of your Medicare Part B coverage?  No    Physical Health:    In general, how would you rate your overall physical health? fair    Outside of work, how many days during the week do you exercise? 2-3 days/week    Outside of work, approximately how many minutes a day do you exercise?greater than 60 minutes    If you drink alcohol do you typically have >3 drinks per day or >7 drinks per week? No    Do you usually eat at least 4 servings of fruit and vegetables a day, include whole grains & fiber and avoid regularly eating high fat or \"junk\" foods? Yes    Do you have any problems taking medications regularly?  No    Do you have any side effects from medications? none    Needs assistance for the following daily activities: no assistance needed    Which of the following safety concerns are present in your home?  none identified     Hearing impairment: No    In the past 6 months, have you been bothered by leaking of urine? Yes, patient states when hands touch cold water, anything cold sometimes will have the urge to urinate    Mental Health:    In general, how would you rate your overall mental or emotional health? good  PHQ-2 Score:      Do you feel safe in your environment? Yes    Do you have a Health Care Directive? Yes: Patient states has Advance Directive and will bring in a copy to clinic.    Additional concerns to address?  No    Fall risk:  Fallen 2 or more times in the past year?: No  Any fall with injury in the past year?: No  click delete button to remove this line now  Cognitive Screenin) Repeat 3 items (Leader, Season, Table)    2) Clock draw: NORMAL  3) 3 item recall: Recalls 3 objects  Results: NORMAL clock, 1-2 items recalled: COGNITIVE IMPAIRMENT LESS LIKELY    Mini-CogTM Copyright ROXY Banegas. Licensed by the author for use in [facility]; reprinted with permission " (young@[facility]). All rights reserved.      Do you have sleep apnea, excessive snoring or daytime drowsiness?: no    Reviewed and updated as needed this visit by clinical staff  Tobacco  Allergies  Meds  Problems  Med Hx  Surg Hx  Fam Hx  Soc Hx        Reviewed and updated as needed this visit by Provider  Tobacco  Allergies  Meds  Problems  Med Hx  Surg Hx  Fam Hx        Social History     Tobacco Use     Smoking status: Former Smoker     Packs/day: 0.30     Years: 7.00     Pack years: 2.10     Types: Cigarettes     Smokeless tobacco: Never Used     Tobacco comment: [id number] (in service)   Substance Use Topics     Alcohol use: No                           Current providers sharing in care for this patient include:   Patient Care Team:  Fidencio Cote MD as PCP - General  Fidencio Cote MD as Assigned PCP  Amy Cummins RN as     The following health maintenance items are reviewed in Epic and correct as of today:  Health Maintenance   Topic Date Due     ZOSTER IMMUNIZATION (2 of 3) 12/12/2013     DIABETIC FOOT EXAM  01/22/2019     EYE EXAM  06/02/2019     MEDICARE ANNUAL WELLNESS VISIT  07/23/2019     FALL RISK ASSESSMENT  07/23/2019     A1C  08/26/2019     INFLUENZA VACCINE (1) 09/01/2019     BMP  02/26/2020     LIPID  02/26/2020     MICROALBUMIN  02/26/2020     TSH W/FREE T4 REFLEX  07/23/2020     ADVANCE CARE PLANNING  07/11/2022     DTAP/TDAP/TD IMMUNIZATION (4 - Td) 11/30/2022     COLONOSCOPY  12/18/2023     HEPATITIS C SCREENING  Completed     PHQ-2  Completed     PNEUMOCOCCAL IMMUNIZATION 65+ LOW/MEDIUM RISK  Completed     AORTIC ANEURYSM SCREENING (SYSTEM ASSIGNED)  Completed     IPV IMMUNIZATION  Aged Out     MENINGITIS IMMUNIZATION  Aged Out           ROS:  Constitutional, HEENT, cardiovascular, pulmonary, gi and gu systems are negative, except as otherwise noted. Has cataracts that are now symptomatic, anticipate surgery in near future.  This document serves as a  "record of the services and decisions personally performed and made by DO ALEXANDER Molina. It was created on his behalf by Rk Olivier, a trained medical scribe. The creation of this document is based the provider's statements to the medical scribe.    Anabela Olivier 11:45 AM 8/27/2019      OBJECTIVE:   /83 (BP Location: Left arm, Patient Position: Sitting, Cuff Size: Adult Regular)   Pulse 68   Temp 98.1  F (36.7  C) (Oral)   Resp 12   Ht 1.702 m (5' 7\")   Wt 64 kg (141 lb)   SpO2 98%   BMI 22.08 kg/m   Estimated body mass index is 22.08 kg/m  as calculated from the following:    Height as of this encounter: 1.702 m (5' 7\").    Weight as of this encounter: 64 kg (141 lb).  EXAM:   GENERAL: healthy, alert and no distress  EYES: Eyes grossly normal to inspection, PERRL and conjunctivae and sclerae normal  HENT: ear canals and TM's normal, nose and mouth without ulcers or lesions; bilateral perforated TM (chronic)  NECK: no adenopathy, no asymmetry, masses, or scars and thyroid normal to palpation  RESP: lungs clear to auscultation - no rales, rhonchi or wheezes  CV: regular rate and rhythm, normal S1 S2, no S3 or S4, no murmur, click or rub, no peripheral edema and peripheral pulses strong  ABDOMEN: soft, nontender, no hepatosplenomegaly, no masses and bowel sounds normal   (male): normal male genitalia without lesions or urethral discharge, no hernia  MS: no gross musculoskeletal defects noted, no edema  SKIN: no suspicious lesions or rashes  NEURO: Normal strength and tone, mentation intact and speech normal  PSYCH: mentation appears normal, affect normal/bright    Diagnostic Test Results:  Labs reviewed in Epic  Results for orders placed or performed in visit on 08/27/19 (from the past 24 hour(s))   Hemoglobin A1c   Result Value Ref Range    Hemoglobin A1C 6.0 (H) 0 - 5.6 %       ASSESSMENT / PLAN:   (Z00.00) Encounter for Medicare annual wellness exam  (primary encounter " "diagnosis)  Comment: Negative screening exam; up-to-date on preventive services.    Plan: Follow up in 1 year    (E11.9) Type 2 diabetes mellitus without complication, without long-term current use of insulin (H)  Comment: Well controlled   Plan: Hemoglobin A1c, Potassium, Creatinine,         lisinopril (PRINIVIL/ZESTRIL) 2.5 MG tablet        Return in about 6 months (around 2/27/2020) for Diabetes.      (E78.5) Hyperlipidemia LDL goal <100  Comment: Historically at goal  Plan: Lipid panel reflex to direct LDL Non-fasting,         ALT, pravastatin (PRAVACHOL) 40 MG tablet        Recheck in 6 months    (K21.9) Gastroesophageal reflux disease without esophagitis  Comment: Prescription update  Plan: omeprazole (PRILOSEC) 20 MG DR capsule            (N40.1,  R39.12) Benign prostatic hyperplasia with weak urinary stream  Comment: Prescription update. I encouraged him to take in daily for full benefit  Plan: tamsulosin (FLOMAX) 0.4 MG capsule            (Z12.11) Colon cancer screening  Comment:   Plan: GASTROENTEROLOGY ADULT REF PROCEDURE ONLY [facility] [phone]; Dr. W. Duane-GI              End of Life Planning:  Patient currently has an advanced directive: No.  I have verified the patient's ablity to prepare an advanced directive/make health care decisions.     COUNSELING:  Reviewed preventive health counseling, as reflected in patient instructions    Estimated body mass index is 22.08 kg/m  as calculated from the following:    Height as of this encounter: 1.702 m (5' 7\").    Weight as of this encounter: 64 kg (141 lb).         reports that he has quit smoking. His smoking use included cigarettes. He has a 2.10 pack-year smoking history. He has never used smokeless tobacco.      Appropriate preventive services were discussed with this patient, including applicable screening as appropriate for cardiovascular disease, diabetes, osteopenia/osteoporosis, and glaucoma.  As appropriate for age/gender, " discussed screening for colorectal cancer, prostate cancer, breast cancer, and cervical cancer. Checklist reviewing preventive services available has been given to the patient.    Reviewed patients plan of care and provided an AVS. The Basic Care Plan (routine screening as documented in Health Maintenance) for Stephon meets the Care Plan requirement. This Care Plan has been established and reviewed with the Patient.    Counseling Resources:  ATP IV Guidelines  Pooled Cohorts Equation Calculator  Breast Cancer Risk Calculator  FRAX Risk Assessment  ICSI Preventive Guidelines  Dietary Guidelines for Americans, 2010  [employer]'s MyPlate  ASA Prophylaxis  Lung CA Screening    Fidencio Cote MD  [facility]    The information in this document, created by a scribe for me, accurately reflects the services I personally performed and the decisions made by me. I have reviewed and approved this document for accuracy.

## 2019-10-08 ENCOUNTER — OFFICE VISIT (OUTPATIENT)
Dept: FAMILY MEDICINE | Facility: CLINIC | Age: 74
End: 2019-10-08
Payer: COMMERCIAL

## 2019-10-08 VITALS
OXYGEN SATURATION: 99 % | TEMPERATURE: 97.4 F | SYSTOLIC BLOOD PRESSURE: 134 MMHG | HEART RATE: 67 BPM | RESPIRATION RATE: 16 BRPM | BODY MASS INDEX: 22.44 KG/M2 | DIASTOLIC BLOOD PRESSURE: 83 MMHG | WEIGHT: 143 LBS | HEIGHT: 67 IN

## 2019-10-08 DIAGNOSIS — K21.9 GASTROESOPHAGEAL REFLUX DISEASE WITHOUT ESOPHAGITIS: ICD-10-CM

## 2019-10-08 DIAGNOSIS — E11.9 TYPE 2 DIABETES MELLITUS WITHOUT COMPLICATION, WITHOUT LONG-TERM CURRENT USE OF INSULIN (H): ICD-10-CM

## 2019-10-08 DIAGNOSIS — H25.13 AGE-RELATED NUCLEAR CATARACT OF BOTH EYES: ICD-10-CM

## 2019-10-08 DIAGNOSIS — Z01.818 PREOP GENERAL PHYSICAL EXAM: Primary | ICD-10-CM

## 2019-10-08 DIAGNOSIS — E78.5 HYPERLIPIDEMIA LDL GOAL <100: ICD-10-CM

## 2019-10-08 DIAGNOSIS — Z23 NEED FOR INFLUENZA VACCINATION: ICD-10-CM

## 2019-10-08 LAB
ANION GAP SERPL CALCULATED.3IONS-SCNC: 8 MMOL/L (ref 3–14)
BUN SERPL-MCNC: 17 MG/DL (ref 7–30)
CALCIUM SERPL-MCNC: 8.9 MG/DL (ref 8.5–10.1)
CHLORIDE SERPL-SCNC: 102 MMOL/L (ref 94–109)
CO2 SERPL-SCNC: 28 MMOL/L (ref 20–32)
CREAT SERPL-MCNC: 0.78 MG/DL (ref 0.66–1.25)
GFR SERPL CREATININE-BSD FRML MDRD: 90 ML/MIN/{1.73_M2}
GLUCOSE SERPL-MCNC: 125 MG/DL (ref 70–99)
POTASSIUM SERPL-SCNC: 3.9 MMOL/L (ref 3.4–5.3)
SODIUM SERPL-SCNC: 138 MMOL/L (ref 133–144)

## 2019-10-08 PROCEDURE — 99214 OFFICE O/P EST MOD 30 MIN: CPT | Mod: 25 | Performed by: FAMILY MEDICINE

## 2019-10-08 PROCEDURE — 36415 COLL VENOUS BLD VENIPUNCTURE: CPT | Performed by: FAMILY MEDICINE

## 2019-10-08 PROCEDURE — 90662 IIV NO PRSV INCREASED AG IM: CPT | Performed by: FAMILY MEDICINE

## 2019-10-08 PROCEDURE — G0008 ADMIN INFLUENZA VIRUS VAC: HCPCS | Performed by: FAMILY MEDICINE

## 2019-10-08 PROCEDURE — 80048 BASIC METABOLIC PNL TOTAL CA: CPT | Performed by: FAMILY MEDICINE

## 2019-10-08 ASSESSMENT — MIFFLIN-ST. JEOR: SCORE: 1357.27

## 2019-10-08 ASSESSMENT — PAIN SCALES - GENERAL: PAINLEVEL: NO PAIN (0)

## 2019-10-08 NOTE — PROGRESS NOTES
[address] [address]  [phone]  Dept: [phone]    PRE-OP EVALUATION:  Today's date: 10/8/2019    Stephon Bryant (: 1945) presents for pre-operative evaluation assessment as requested by Dr. Steward.  He requires evaluation and anesthesia risk assessment prior to undergoing surgery/procedure for treatment of Cataracts .    Proposed Surgery/ Procedure: RIGHT (and LEFT) KELMAN PHACOEMULSIFICATION CLEAR CORNEAL INTRAOCULAR LENS IMPLANT   Date of Surgery/ Procedure: 10/16/2019 (and 10/30/19)  Time of Surgery/ Procedure: [facility]   Hospital/Surgical Facility: [facility]   Fax number for surgical facility: [phone]  Primary Physician: Fidencio Cote  Type of Anesthesia Anticipated: to be determined    Patient has a Health Care Directive or Living Will:  YES     1. NO - Do you have a history of heart attack, stroke, stent, bypass or surgery on an artery in the head, neck, heart or legs?  2. NO - Do you ever have any pain or discomfort in your chest?  3. NO - Do you have a history of  Heart Failure?  4. NO - Are you troubled by shortness of breath when: walking on the level, up a slight hill or at night?  5. NO - Do you currently have a cold, bronchitis or other respiratory infection?  6. NO - Do you have a cough, shortness of breath or wheezing?  7. NO - Do you sometimes get pains in the calves of your legs when you walk?  8. NO - Do you or anyone in your family have previous history of blood clots?  9. NO - Do you or does anyone in your family have a serious bleeding problem such as prolonged bleeding following surgeries or cuts?  10. NO - Have you ever had problems with anemia or been told to take iron pills?  11. NO - Have you had any abnormal blood loss such as black, tarry or bloody stools, or abnormal vaginal bleeding?  12. NO - Have you ever had a blood transfusion?  13. NO - Have you or any of  your relatives ever had problems with anesthesia?  14. NO - Do you have sleep apnea, excessive snoring or daytime drowsiness?  15. NO - Do you have any prosthetic heart valves?  16. NO - Do you have prosthetic joints?  17. NO - Is there any chance that you may be pregnant?      HPI:     HPI related to upcoming procedure: This 74 year old male complains of cataracts for a couple of years. He first noticed them in the mirror and went to the doctor. He went every year, and this year his doctor said to get surgery. His eyes have gotten increasingly worse until this year where his glasses are no longer correcting his visual acuity sufficiently.       See problem list for active medical problems.  Problems all longstanding and stable, except as noted/documented.  See ROS for pertinent symptoms related to these conditions.      MEDICAL HISTORY:     Patient Active Problem List    Diagnosis Date Noted     Type 2 diabetes mellitus without complication, without long-term current use of insulin (H) 12/08/2016     Priority: Medium     Persistent disorder of initiating or maintaining sleep 12/07/2015     Priority: Medium     Gastroesophageal reflux disease without esophagitis 08/01/2014     Priority: Medium     Advance care planning 09/05/2013     Priority: Medium     Advance Care Planning 6/9/2016: Discussed advance care planning with patient; information given to patient to review. June 9, 2016 Fidencio Cote MD   Discussed advance care planning with patient; information given to patient to review. July 11, 2017 Fidencio Cote MD          Hyperlipidemia LDL goal <100 03/19/2013     Priority: Medium     History of basal cell carcinoma 03/19/2013     Priority: Medium     Between left eye and nose       Erectile dysfunction 03/19/2013     Priority: Medium      History reviewed. No pertinent past medical history.  Past Surgical History:   Procedure Laterality Date     COLONOSCOPY       COLONOSCOPY  12/18/2013    Procedure:  COMBINED COLONOSCOPY, SINGLE BIOPSY/POLYPECTOMY BY BIOPSY;  Cecal polyp-X 1, removed with cold biopsy forceps.;  Surgeon: Duane, William Charles, MD;  Location: [facility] OR     ESOPHAGOSCOPY, GASTROSCOPY, DUODENOSCOPY (EGD), COMBINED  12/18/2013    Procedure: COMBINED ESOPHAGOSCOPY, GASTROSCOPY, DUODENOSCOPY (EGD), BIOPSY SINGLE OR MULTIPLE;  Duodenum BX were taken with cold biopsy forceps to evaluate for spru.;  Surgeon: Duane, William Charles, MD;  Location: [facility] OR     PROSTATE BIOPSY, NEEDLE, SATURATION SAMPLING  pre-2005    [facility] for elevated PSA     Current Outpatient Medications   Medication Sig Dispense Refill     aspirin 81 MG tablet Take 1 tablet by mouth daily *.        cholecalciferol (VITAMIN D3) 5000 UNITS CAPS capsule 5,000 Units every other day        FISH OIL 1 capsule every other day       GLUCOSAMINE CHONDROITIN COMPLX PO 1 capsule every other day       lisinopril (PRINIVIL/ZESTRIL) 2.5 MG tablet Take 1 tablet (2.5 mg) by mouth daily for kidneys in diabetes. 90 tablet 1     meclizine (ANTIVERT) 25 MG tablet Take 1 tablet (25 mg) by mouth 3 times daily as needed for dizziness 60 tablet 0     Multiple Vitamins-Minerals (MULTIVITAMIN OR) 1 tab every other day       omeprazole (PRILOSEC) 20 MG DR capsule Take 1 capsule (20 mg) by mouth daily as needed for reflux. 90 capsule 3     ONETOUCH VERIO IQ test strip use to test blood sugar 2 times daily or as directed 100 each 2     pravastatin (PRAVACHOL) 40 MG tablet Take 1 tablet (40 mg) by mouth every evening for cholesterol. 90 tablet 1     tamsulosin (FLOMAX) 0.4 MG capsule Take 1 capsule (0.4 mg) by mouth daily for urine flow. 90 capsule 3     traZODone (DESYREL) 50 MG tablet Take 0.5-1 tablets (25-50 mg) by mouth nightly as needed for sleep 90 tablet 1     sildenafil (VIAGRA) 100 MG tablet Take 1/4  tablet, as directed, 1-3 hours before intimacy. Maximum 1/2 dose per 24 hours. (Patient not taking: Reported on 8/27/2019) 10 tablet 1     OTC products:  "occasional Advil PM    Allergies   Allergen Reactions     Shellfish-Derived Products Swelling     shrimp, face swelling     Penicillins Rash     Proanthocyanidin Swelling and Rash      face swell, rash hands      Latex Allergy: NO    Social History     Tobacco Use     Smoking status: Former Smoker     Packs/day: 0.30     Years: 7.00     Pack years: 2.10     Types: Cigarettes     Smokeless tobacco: Never Used     Tobacco comment: 1578-1972 (in service)   Substance Use Topics     Alcohol use: No     History   Drug Use No       REVIEW OF SYSTEMS:   Constitutional, neuro, ENT, endocrine, pulmonary, cardiac, gastrointestinal, genitourinary, musculoskeletal, integument and psychiatric systems are negative, except as otherwise noted.    Any history above obtained by the Medical Assistant was reviewed by Dr. Fidencio Cote MD, and edited when necessary.    This document serves as a record of the services and decisions personally performed and made by Dr. Cote. It was created on his behalf by Mellisa Whitlock, a trained medical scribe. The creation of this document is based the provider's statements to the medical scribe.  Mellisa Whitlock,  10:48 AM     EXAM:   /83 (BP Location: Left arm, Patient Position: Chair, Cuff Size: Adult Regular)   Pulse 67   Temp 97.4  F (36.3  C) (Oral)   Resp 16   Ht 1.702 m (5' 7\")   Wt 64.9 kg (143 lb)   SpO2 99%   BMI 22.40 kg/m         GENERAL APPEARANCE: healthy, alert and no distress     EYES: EOMI,  PERRL     HENT: ear canals normal and nose and mouth without ulcers or lesions, both TM's have scarring from previous perforations     RESP: lungs clear to auscultation - no rales, rhonchi or wheezes     CV: regular rates and rhythm, normal S1 S2, no S3 or S4 and no murmur, click or rub     ABDOMEN:  soft, nontender, no HSM or masses and bowel sounds normal     MS: extremities normal- no gross deformities noted, no evidence of inflammation in joints, FROM in all extremities.     " SKIN: no suspicious lesions or rashes     NEURO: Normal strength and tone, sensory exam grossly normal, mentation intact and speech normal     PSYCH: mentation appears normal. and affect normal/bright    DIAGNOSTICS:   No labs or EKG required for low risk surgery (cataract, skin procedure, breast biopsy, etc)    Recent Labs   Lab Test 08/27/19  1107 02/26/19  0859  01/22/18  1001 07/17/17  1417  04/04/17  1619   HGB  --   --   --   --  14.5  --  14.9   PLT  --   --   --   --  169  --  181   NA  --  139  --  138  --   --   --    POTASSIUM 3.8 3.7   < > 3.8  --   --   --    CR 0.82 0.80   < > 0.86  --   --   --    A1C 6.0* 6.3*   < > 5.8  --    < >  --     < > = values in this interval not displayed.        IMPRESSION:   Reason for surgery/procedure: visually significant cataracts  Diagnosis/reason for consult: Pre-op evaluation of fitness for surgery & proposed anesthesia.    The proposed surgical procedure is considered LOW risk.    REVISED CARDIAC RISK INDEX  The patient has the following serious cardiovascular risks for perioperative complications such as (MI, PE, VFib and 3  AV Block):  No serious cardiac risks  INTERPRETATION: 0 risks: Class I (very low risk - 0.4% complication rate)    The patient has the following additional risks for perioperative complications:  The diagnoses listed below:        ICD-10-CM    1. Preop general physical exam Z01.818    2. Age-related nuclear cataract of both eyes H25.13    3. Type 2 diabetes mellitus without complication, without long-term current use of insulin (H) E11.9 Basic metabolic panel   4. Hyperlipidemia LDL goal <100 E78.5    5. Gastroesophageal reflux disease without esophagitis K21.9    6. Need for influenza vaccination Z23 INFLUENZA (HIGH DOSE) 3 VALENT VACCINE [12935]          ADMIN VACCINE, FIRST [48573]       RECOMMENDATIONS:     APPROVAL GIVEN to proceed with proposed procedure, without further diagnostic evaluation     The information in this document,  created by the medical scribe for me, accurately reflects the services I personally performed and the decisions made by me. I have reviewed and approved this document for accuracy prior to leaving the patient care area.  Signed Electronically by: Fidencio Cote MD    Copy of this evaluation report is provided to requesting physician.    [facility] Preop Guidelines    Revised Cardiac Risk Index

## 2019-10-08 NOTE — PATIENT INSTRUCTIONS
Before Your Surgery      Call your surgeon if there is any change in your health. This includes signs of a cold or flu (such as a sore throat, runny nose, cough, rash or fever).    Do not smoke, drink alcohol or take over the counter medicine (unless your surgeon or primary care doctor tells you to) for the 24 hours before and after surgery.    If you take prescribed drugs: Follow your doctor s orders about which medicines to take and which to stop until after surgery.    Eating and drinking prior to surgery: follow the instructions from your surgeon    Take a shower or bath the night before surgery. Use the soap your surgeon gave you to gently clean your skin. If you do not have soap from your surgeon, use your regular soap. Do not shave or scrub the surgery site.  Wear clean pajamas and have clean sheets on your bed.     At [facility], we strive to deliver an exceptional experience to you, every time we see you.  If you receive a survey in the mail, please send us back your thoughts. We really do value your feedback.    Based on your medical history, these are the current health maintenance/preventive care services that you are due for (some may have been done at this visit.)  Health Maintenance Due   Topic Date Due     ZOSTER IMMUNIZATION (2 of 3) 12/12/2013     DIABETIC FOOT EXAM  01/22/2019     EYE EXAM  06/02/2019     INFLUENZA VACCINE (1) 09/01/2019         Suggested websites for health information:  Www.[employer].Can'tWait : Up to date and easily searchable information on multiple topics.  Www.medlineplus.gov : medication info, interactive tutorials, watch real surgeries online  Www.familydoctor.org : good info from the Academy of Family Physicians  Www.cdc.gov : public health info, travel advisories, epidemics (H1N1)  Www.aap.org : children's health info, normal development, vaccinations  Www.health.state.[place].us : [place] dept of health, public health issues in [place], N1N1    Your care team:                             Family Medicine Internal Medicine   MD Tripp Wilde MD Shantel Branch-Fleming, MD Katya Georgiev PA-C Nam Ho, MD Pediatrics   LIZZETH Garzon, MD Desire Haynes CNP, MD Deborah Mielke, MD Kim Thein, APRN [facility]      Clinic hours: Monday - Thursday 7 am-7 pm; Fridays 7 am-5 pm.   Urgent care: Monday - Friday 11 am-9 pm; Saturday and Sunday 9 am-5 pm.  Pharmacy : Monday -Thursday 8 am-8 pm; Friday 8 am-6 pm; Saturday and Sunday 9 am-5 pm.     Clinic: [phone]   Pharmacy: [phone]

## 2019-11-04 ENCOUNTER — OFFICE VISIT (OUTPATIENT)
Dept: FAMILY MEDICINE | Facility: CLINIC | Age: 74
End: 2019-11-04
Payer: COMMERCIAL

## 2019-11-04 VITALS
SYSTOLIC BLOOD PRESSURE: 135 MMHG | DIASTOLIC BLOOD PRESSURE: 84 MMHG | WEIGHT: 143 LBS | HEIGHT: 67 IN | TEMPERATURE: 98.3 F | BODY MASS INDEX: 22.44 KG/M2 | RESPIRATION RATE: 18 BRPM | OXYGEN SATURATION: 95 % | HEART RATE: 77 BPM

## 2019-11-04 DIAGNOSIS — L03.011 CHRONIC PARONYCHIA OF FINGER OF RIGHT HAND: ICD-10-CM

## 2019-11-04 DIAGNOSIS — L98.9 LESION OF FINGER: Primary | ICD-10-CM

## 2019-11-04 PROCEDURE — 99213 OFFICE O/P EST LOW 20 MIN: CPT | Performed by: FAMILY MEDICINE

## 2019-11-04 ASSESSMENT — MIFFLIN-ST. JEOR: SCORE: 1357.27

## 2019-11-04 ASSESSMENT — PAIN SCALES - GENERAL: PAINLEVEL: MODERATE PAIN (4)

## 2019-11-04 NOTE — PATIENT INSTRUCTIONS
At [facility], we strive to deliver an exceptional experience to you, every time we see you. If you receive a survey, please complete it as we do value your feedback.  If you have MyChart, you can expect to receive results automatically within 24 hours of their completion.  Your provider will send a note interpreting your results as well.   If you do not have MyChart, you should receive your results in about a week by mail.    Your care team:                            Family Medicine Internal Medicine   MD Tripp Wilde MD Shantel Branch-Fleming, MD Katya Georgiev PA-C Megan Hill, APRN SAMANTHA Hough, MD Pediatrics   Jony Crawley, PAEVGENY Segura, MD Marry Andrews APRN CNP   MD Desire Brown MD Deborah Mielke, MD Kim Thein, APRN CNP      Clinic hours: Monday - Thursday 7 am-7 pm; Fridays 7 am-5 pm.   Urgent care: Monday - Friday 11 am-9 pm; Saturday and Sunday 9 am-5 pm.  Pharmacy : Monday -Thursday 8 am-8 pm; Friday 8 am-6 pm; Saturday and Sunday 9 am-5 pm.     Clinic: [phone]   Pharmacy: [phone]      To schedule the colonoscopy, you may call [phone] (Specialty Scheduling Line) or [phone] ([facility]).

## 2019-11-04 NOTE — PROGRESS NOTES
"Subjective     Stephon Bryant is a 74 year old male who presents to clinic today for the following health issues:    HPI   Concern - Right middle finger nail   Onset: Follow up from 04/22/2019- has been going on over year     Description:   Patient complains of right middle finger nail pain.     Intensity: moderate    Progression of Symptoms:  intermittent    Accompanying Signs & Symptoms:  Red, swelling, and puss once in awhile     Previous history of similar problem:   Yes    Therapies Tried and outcome: Cortisone helped      Past medical, family, and social histories, medications, and allergies are reviewed and updated in [facility].    Review of Systems   ROS COMP: Constitutional, HEENT, cardiovascular, pulmonary, gi and gu systems are negative, except as otherwise noted.      This document serves as a record of the services and decisions personally performed and made by Dr. Cote. It was created on his behalf by Mellisa Whitlock, a trained medical scribe. The creation of this document is based the provider's statements to the medical scribe.  Mellisa Whitlock,  11:13 AM     Objective    /84 (BP Location: Left arm, Patient Position: Sitting, Cuff Size: Adult Regular)   Pulse 77   Temp 98.3  F (36.8  C) (Oral)   Resp 18   Ht 1.702 m (5' 7\")   Wt 64.9 kg (143 lb)   SpO2 95%   BMI 22.40 kg/m    Body mass index is 22.4 kg/m .     Physical Exam   GENERAL: healthy, alert and no distress  EYES: Eyes grossly normal to inspection, PERRL, EOMI, sclerae white and conjunctivae normal  MS: no gross musculoskeletal defects noted, no edema  SKIN: there is a lesion of the cuticle of the right middle finger, with evidence of chronicity including layers of hyperkeratotic skin and a corresponding groove in the entire fingernail  NEURO: Normal strength and tone, sensory exam grossly normal, mentation intact, oriented times 3 and cranial nerves 2-12 intact  PSYCH: mentation appears normal, affect normal/bright          Assessment & " Plan     (L98.9) Lesion of finger  (primary encounter diagnosis)  (L03.011) 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       The information in this document, created by the medical scribe for me, accurately reflects the services I personally performed and the decisions made by me. I have reviewed and approved this document for accuracy prior to leaving the patient care area.  Fidencio Cote MD

## 2019-11-21 ENCOUNTER — HOSPITAL ENCOUNTER (OUTPATIENT)
Facility: AMBULATORY SURGERY CENTER | Age: 74
End: 2019-11-21
Attending: INTERNAL MEDICINE | Admitting: INTERNAL MEDICINE
Payer: COMMERCIAL

## 2019-11-24 ENCOUNTER — TELEPHONE (OUTPATIENT)
Dept: FAMILY MEDICINE | Facility: CLINIC | Age: 74
End: 2019-11-24

## 2019-11-24 NOTE — TELEPHONE ENCOUNTER
----- Message from William Charles Duane, MD sent at 11/21/2019  2:24 PM CST -----  Regarding: colonoscopy  Dr. Cote    Mr. Bryant is scheduled for colonoscopy with me on Dec. 4 for screening.  Please see my letter dated Dec. 20, 2013 recommending another colonoscopy in 10 years.  Unless there is some reason other than screening to do the colonoscopy, please consider re-scheduling for four years from now.  Among other things, Mr. Bryant's insurance may not cover another screening colonoscopy done so soon after his last one.    Thanks    William Duane

## 2019-11-24 NOTE — TELEPHONE ENCOUNTER
Please notify the patient that we recommend he cancels his colonoscopy.  He does not need a screening colonoscopy before 2023, and his insurance likely will not pat for it.

## 2019-11-25 NOTE — TELEPHONE ENCOUNTER
Patient was notified regarding MSG below.  Patient restated the msg, denied having any questions and stated clarification at the end of the conversation.  Gem Morales

## 2019-12-20 DIAGNOSIS — G47.00 PERSISTENT DISORDER OF INITIATING OR MAINTAINING SLEEP: Chronic | ICD-10-CM

## 2019-12-20 NOTE — TELEPHONE ENCOUNTER
"Requested Prescriptions   Pending Prescriptions Disp Refills     traZODone (DESYREL) 50 MG tablet  Last Written Prescription Date:  07/23/18  Last Fill Quantity: 90,  # refills: 1   Last Office Visit with [facility], P or [facility] prescribing provider:  11/04/19Jun   Future Office Visit:    90 tablet 1     Sig: Take 0.5-1 tablets (25-50 mg) by mouth nightly as needed for sleep       Serotonin Modulators Passed - 12/20/2019 11:51 AM        Passed - Recent (12 mo) or future (30 days) visit within the authorizing provider's specialty     Patient has had an office visit with the authorizing provider or a provider within the authorizing providers department within the previous 12 mos or has a future within next 30 days. See \"Patient Info\" tab in inbasket, or \"Choose Columns\" in Meds & Orders section of the refill encounter.              Passed - Medication is active on med list        Passed - Patient is age 18 or older          "

## 2019-12-24 RX ORDER — TRAZODONE HYDROCHLORIDE 50 MG/1
25-50 TABLET, FILM COATED ORAL
Qty: 90 TABLET | Refills: 1 | Status: SHIPPED | OUTPATIENT
Start: 2019-12-24 | End: 2020-07-29

## 2019-12-24 NOTE — TELEPHONE ENCOUNTER
Routing refill request to provider for review/approval because:  A break in medication  traZODone (DESYREL) 50 MG tablet 90 tablet 1 7/23/2018  No   Sig - Route: Take 0.5-1 tablets (25-50 mg) by mouth nightly as needed for sleep - Oral   Sent to pharmacy as: traZODone (DESYREL) 50 MG tablet       Tess Resendiz RN

## 2020-04-24 DIAGNOSIS — E78.5 HYPERLIPIDEMIA LDL GOAL <100: ICD-10-CM

## 2020-04-24 DIAGNOSIS — E11.9 TYPE 2 DIABETES MELLITUS WITHOUT COMPLICATION, WITHOUT LONG-TERM CURRENT USE OF INSULIN (H): Primary | ICD-10-CM

## 2020-04-24 NOTE — LETTER
May 5, 2020      Stephonrena Bryant  [address]  [place] [address]        Dear DakotahAnnette,    At [facility] we care about your health and are committed to providing quality patient care. Regular appointments are a vital part of the care and management of your health and can help prevent many of the complications that can occur.       It has come to our attention that you have been given a refill  of pravastatin and that you are due for a visit with your provider with pre-visit lab work in May 2020.  Please call [facility] at [phone] or use [employer] to schedule your appointments.       Sincerely,   [facility] Care Team

## 2020-04-30 RX ORDER — PRAVASTATIN SODIUM 40 MG
40 TABLET ORAL EVERY EVENING
Qty: 90 TABLET | Refills: 0 | Status: SHIPPED | OUTPATIENT
Start: 2020-04-30 | End: 2020-07-29

## 2020-05-05 NOTE — TELEPHONE ENCOUNTER
Reminder letter to schedule needed appt for further refills mailed to pt's home address.      Diana BLAKELY (R))

## 2020-07-26 DIAGNOSIS — G47.00 PERSISTENT DISORDER OF INITIATING OR MAINTAINING SLEEP: Chronic | ICD-10-CM

## 2020-07-26 DIAGNOSIS — E78.5 HYPERLIPIDEMIA LDL GOAL <100: ICD-10-CM

## 2020-07-29 RX ORDER — PRAVASTATIN SODIUM 40 MG
40 TABLET ORAL EVERY EVENING
Qty: 90 TABLET | Refills: 0 | Status: SHIPPED | OUTPATIENT
Start: 2020-07-29

## 2020-07-29 RX ORDER — TRAZODONE HYDROCHLORIDE 50 MG/1
25-50 TABLET, FILM COATED ORAL
Qty: 90 TABLET | Refills: 0 | Status: SHIPPED | OUTPATIENT
Start: 2020-07-29 | End: 2020-11-03

## 2020-07-29 NOTE — TELEPHONE ENCOUNTER
Prescription approved per [facility] Refill Protocol.    Jes Bill RN

## 2020-07-29 NOTE — TELEPHONE ENCOUNTER
Prescription approved per [facility] Refill Protocol.    Jes Bill RN

## 2020-10-31 DIAGNOSIS — G47.00 PERSISTENT DISORDER OF INITIATING OR MAINTAINING SLEEP: Chronic | ICD-10-CM

## 2020-11-03 RX ORDER — TRAZODONE HYDROCHLORIDE 50 MG/1
TABLET, FILM COATED ORAL
Qty: 90 TABLET | Refills: 0 | Status: SHIPPED | OUTPATIENT
Start: 2020-11-03 | End: 2021-02-05

## 2020-11-03 NOTE — TELEPHONE ENCOUNTER
Medication is being filled for 1 time refill only due to:  Patient needs to be seen because due for visit in November 2020.       Aisha Arora RN, BSN, PHN

## 2021-02-05 DIAGNOSIS — G47.00 PERSISTENT DISORDER OF INITIATING OR MAINTAINING SLEEP: Chronic | ICD-10-CM

## 2021-02-05 RX ORDER — TRAZODONE HYDROCHLORIDE 50 MG/1
TABLET, FILM COATED ORAL
Qty: 90 TABLET | Refills: 0 | Status: SHIPPED | OUTPATIENT
Start: 2021-02-05 | End: 2021-05-05

## 2021-02-05 NOTE — TELEPHONE ENCOUNTER
Routing refill request to provider for review/approval because:  Patient needs to be seen because it has been more than 1 year since last office visit.    No appointment pending at this time.  Routing to provider to advise.    Pennie Fox BSN, RN

## 2021-04-17 ENCOUNTER — HEALTH MAINTENANCE LETTER (OUTPATIENT)
Age: 76
End: 2021-04-17

## 2021-08-10 DIAGNOSIS — G47.00 PERSISTENT DISORDER OF INITIATING OR MAINTAINING SLEEP: Chronic | ICD-10-CM

## 2021-08-11 NOTE — TELEPHONE ENCOUNTER
Routing refill request to provider for review/approval because:  Earnestine given x1 and patient did not follow up, please advise    Rivka Garcias BSN, RN

## 2021-08-16 DIAGNOSIS — G47.00 PERSISTENT DISORDER OF INITIATING OR MAINTAINING SLEEP: Chronic | ICD-10-CM

## 2021-08-16 RX ORDER — TRAZODONE HYDROCHLORIDE 50 MG/1
TABLET, FILM COATED ORAL
Qty: 90 TABLET | Refills: 0 | Status: CANCELLED | OUTPATIENT
Start: 2021-08-16

## 2021-08-17 RX ORDER — TRAZODONE HYDROCHLORIDE 50 MG/1
TABLET, FILM COATED ORAL
Qty: 90 TABLET | Refills: 0 | OUTPATIENT
Start: 2021-08-17

## 2021-08-17 NOTE — TELEPHONE ENCOUNTER
Routing refill request to provider for review/approval because:  Earnestine given x1 and patient did not follow up, please advise.  ? New provider with Ne Correa RN

## 2021-09-03 NOTE — TELEPHONE ENCOUNTER
Left voicemail message with pt to call and schedule an appointment.    Vania Chery, Patient Representative

## 2021-09-26 ENCOUNTER — HEALTH MAINTENANCE LETTER (OUTPATIENT)
Age: 76
End: 2021-09-26

## 2021-11-21 ENCOUNTER — HEALTH MAINTENANCE LETTER (OUTPATIENT)
Age: 76
End: 2021-11-21

## 2021-12-21 DIAGNOSIS — G47.00 PERSISTENT DISORDER OF INITIATING OR MAINTAINING SLEEP: Chronic | ICD-10-CM

## 2021-12-22 NOTE — TELEPHONE ENCOUNTER
Routing refill request to provider for review/approval because:  Patient has not been seen by PCP for the past 2 year. No labs on file.   Melissa Garcia RN, BSN   [facility]

## 2021-12-23 DIAGNOSIS — N52.9 ERECTILE DYSFUNCTION, UNSPECIFIED ERECTILE DYSFUNCTION TYPE: ICD-10-CM

## 2021-12-24 NOTE — TELEPHONE ENCOUNTER
"Requested Prescriptions   Pending Prescriptions Disp Refills     sildenafil (VIAGRA) 100 MG tablet [Pharmacy Med Name: Sildenafil Citrate 100 MG Oral Tablet] 4 tablet 0     Sig: TAKE ONE-FOURTH TO ONE TABLET BY MOUTH AS DIRECTED 1-3 HOURS BEFORE INTIMACY. DO NOT EXCEED ONE DOSE IN 24 HOURS.       Erectile Dysfuction Protocol Failed - 12/24/2021 11:01 AM        Failed - Absence of Alpha Blockers on Med list        Failed - Recent (12 mo) or future (30 days) visit within the authorizing provider's specialty     Patient has had an office visit with the authorizing provider or a provider within the authorizing providers department within the previous 12 mos or has a future within next 30 days. See \"Patient Info\" tab in inbasket, or \"Choose Columns\" in Meds & Orders section of the refill encounter.              Passed - Absence of nitrates on medication list        Passed - Medication is active on med list        Passed - Patient is age 18 or older           Pennie RAMIREZ, RN    "

## 2022-01-12 RX ORDER — SILDENAFIL 100 MG/1
TABLET, FILM COATED ORAL
Qty: 4 TABLET | Refills: 0 | OUTPATIENT
Start: 2022-01-12

## 2022-01-12 RX ORDER — TRAZODONE HYDROCHLORIDE 50 MG/1
TABLET, FILM COATED ORAL
Qty: 90 TABLET | Refills: 0 | OUTPATIENT
Start: 2022-01-12

## 2022-01-12 NOTE — TELEPHONE ENCOUNTER
Called and left a voicemail message to return our call to schedule a visit for the refill.  Brigitte Hurst MA  [facility]  2nd Floor  Primary Care

## 2022-01-20 ENCOUNTER — TELEPHONE (OUTPATIENT)
Dept: FAMILY MEDICINE | Facility: CLINIC | Age: 77
End: 2022-01-20

## 2022-05-08 ENCOUNTER — HEALTH MAINTENANCE LETTER (OUTPATIENT)
Age: 77
End: 2022-05-08

## 2022-07-03 ENCOUNTER — HEALTH MAINTENANCE LETTER (OUTPATIENT)
Age: 77
End: 2022-07-03

## 2023-04-23 ENCOUNTER — HEALTH MAINTENANCE LETTER (OUTPATIENT)
Age: 78
End: 2023-04-23

## 2023-06-02 ENCOUNTER — HEALTH MAINTENANCE LETTER (OUTPATIENT)
Age: 78
End: 2023-06-02

## 2023-12-03 ENCOUNTER — HEALTH MAINTENANCE LETTER (OUTPATIENT)
Age: 78
End: 2023-12-03

## 2023-12-11 ENCOUNTER — TRANSFERRED RECORDS (OUTPATIENT)
Dept: HEALTH INFORMATION MANAGEMENT | Facility: CLINIC | Age: 78
End: 2023-12-11

## 2023-12-21 ENCOUNTER — TRANSFERRED RECORDS (OUTPATIENT)
Dept: HEALTH INFORMATION MANAGEMENT | Facility: CLINIC | Age: 78
End: 2023-12-21